# Patient Record
Sex: FEMALE | Race: WHITE | NOT HISPANIC OR LATINO | Employment: FULL TIME | URBAN - METROPOLITAN AREA
[De-identification: names, ages, dates, MRNs, and addresses within clinical notes are randomized per-mention and may not be internally consistent; named-entity substitution may affect disease eponyms.]

---

## 2018-08-08 ENCOUNTER — HOSPITAL ENCOUNTER (EMERGENCY)
Facility: HOSPITAL | Age: 23
Discharge: HOME | End: 2018-08-08
Attending: EMERGENCY MEDICINE | Admitting: EMERGENCY MEDICINE

## 2018-08-08 VITALS
HEART RATE: 89 BPM | WEIGHT: 132.5 LBS | BODY MASS INDEX: 22.62 KG/M2 | OXYGEN SATURATION: 96 % | DIASTOLIC BLOOD PRESSURE: 79 MMHG | TEMPERATURE: 100.5 F | SYSTOLIC BLOOD PRESSURE: 113 MMHG | RESPIRATION RATE: 18 BRPM | HEIGHT: 64 IN

## 2018-08-08 DIAGNOSIS — Z32.01 PREGNANCY TEST POSITIVE: ICD-10-CM

## 2018-08-08 DIAGNOSIS — J02.9 EXUDATIVE PHARYNGITIS: Primary | ICD-10-CM

## 2018-08-08 LAB
ALBUMIN SERPL-MCNC: 4.5 G/DL (ref 3.5–5.2)
ALBUMIN/GLOB SERPL: 1.4 G/DL
ALP SERPL-CCNC: 53 U/L (ref 39–117)
ALT SERPL W P-5'-P-CCNC: 20 U/L (ref 1–33)
ANION GAP SERPL CALCULATED.3IONS-SCNC: 13.1 MMOL/L
AST SERPL-CCNC: 17 U/L (ref 1–32)
BACTERIA UR QL AUTO: ABNORMAL /HPF
BASOPHILS # BLD AUTO: 0.01 10*3/MM3 (ref 0–0.2)
BASOPHILS NFR BLD AUTO: 0.1 % (ref 0–1.5)
BILIRUB SERPL-MCNC: 0.3 MG/DL (ref 0.1–1.2)
BILIRUB UR QL STRIP: NEGATIVE
BUN BLD-MCNC: 10 MG/DL (ref 6–20)
BUN/CREAT SERPL: 11.9 (ref 7–25)
CALCIUM SPEC-SCNC: 9 MG/DL (ref 8.6–10.5)
CHLORIDE SERPL-SCNC: 100 MMOL/L (ref 98–107)
CLARITY UR: ABNORMAL
CO2 SERPL-SCNC: 22.9 MMOL/L (ref 22–29)
COLOR UR: YELLOW
CREAT BLD-MCNC: 0.84 MG/DL (ref 0.57–1)
D-LACTATE SERPL-SCNC: 1 MMOL/L (ref 0.5–2)
DEPRECATED RDW RBC AUTO: 44.4 FL (ref 37–54)
EOSINOPHIL # BLD AUTO: 0 10*3/MM3 (ref 0–0.7)
EOSINOPHIL NFR BLD AUTO: 0 % (ref 0.3–6.2)
ERYTHROCYTE [DISTWIDTH] IN BLOOD BY AUTOMATED COUNT: 12.4 % (ref 11.7–13)
GFR SERPL CREATININE-BSD FRML MDRD: 104 ML/MIN/1.73
GFR SERPL CREATININE-BSD FRML MDRD: 86 ML/MIN/1.73
GLOBULIN UR ELPH-MCNC: 3.2 GM/DL
GLUCOSE BLD-MCNC: 152 MG/DL (ref 65–99)
GLUCOSE UR STRIP-MCNC: NEGATIVE MG/DL
HCG INTACT+B SERPL-ACNC: 23.13 MIU/ML
HCG SERPL QL: POSITIVE
HCT VFR BLD AUTO: 40.6 % (ref 35.6–45.5)
HETEROPH AB SER QL LA: NEGATIVE
HGB BLD-MCNC: 13.8 G/DL (ref 11.9–15.5)
HGB UR QL STRIP.AUTO: ABNORMAL
HOLD SPECIMEN: NORMAL
HYALINE CASTS UR QL AUTO: ABNORMAL /LPF
IMM GRANULOCYTES # BLD: 0.01 10*3/MM3 (ref 0–0.03)
IMM GRANULOCYTES NFR BLD: 0.1 % (ref 0–0.5)
KETONES UR QL STRIP: ABNORMAL
LEUKOCYTE ESTERASE UR QL STRIP.AUTO: ABNORMAL
LYMPHOCYTES # BLD AUTO: 0.66 10*3/MM3 (ref 0.9–4.8)
LYMPHOCYTES NFR BLD AUTO: 6.8 % (ref 19.6–45.3)
MCH RBC QN AUTO: 33.2 PG (ref 26.9–32)
MCHC RBC AUTO-ENTMCNC: 34 G/DL (ref 32.4–36.3)
MCV RBC AUTO: 97.6 FL (ref 80.5–98.2)
MONOCYTES # BLD AUTO: 0.75 10*3/MM3 (ref 0.2–1.2)
MONOCYTES NFR BLD AUTO: 7.7 % (ref 5–12)
MUCOUS THREADS URNS QL MICRO: ABNORMAL /HPF
NEUTROPHILS # BLD AUTO: 8.26 10*3/MM3 (ref 1.9–8.1)
NEUTROPHILS NFR BLD AUTO: 85.4 % (ref 42.7–76)
NITRITE UR QL STRIP: NEGATIVE
PH UR STRIP.AUTO: <=5 [PH] (ref 5–8)
PLATELET # BLD AUTO: 186 10*3/MM3 (ref 140–500)
PMV BLD AUTO: 10.8 FL (ref 6–12)
POTASSIUM BLD-SCNC: 3.6 MMOL/L (ref 3.5–5.2)
PROT SERPL-MCNC: 7.7 G/DL (ref 6–8.5)
PROT UR QL STRIP: ABNORMAL
RBC # BLD AUTO: 4.16 10*6/MM3 (ref 3.9–5.2)
RBC # UR: ABNORMAL /HPF
REF LAB TEST METHOD: ABNORMAL
S PYO AG THROAT QL: NEGATIVE
SODIUM BLD-SCNC: 136 MMOL/L (ref 136–145)
SP GR UR STRIP: 1.03 (ref 1–1.03)
SQUAMOUS #/AREA URNS HPF: ABNORMAL /HPF
UROBILINOGEN UR QL STRIP: ABNORMAL
WBC NRBC COR # BLD: 9.68 10*3/MM3 (ref 4.5–10.7)
WBC UR QL AUTO: ABNORMAL /HPF
WHOLE BLOOD HOLD SPECIMEN: NORMAL
WHOLE BLOOD HOLD SPECIMEN: NORMAL

## 2018-08-08 PROCEDURE — 86308 HETEROPHILE ANTIBODY SCREEN: CPT | Performed by: PHYSICIAN ASSISTANT

## 2018-08-08 PROCEDURE — 80053 COMPREHEN METABOLIC PANEL: CPT | Performed by: PHYSICIAN ASSISTANT

## 2018-08-08 PROCEDURE — 81001 URINALYSIS AUTO W/SCOPE: CPT | Performed by: PHYSICIAN ASSISTANT

## 2018-08-08 PROCEDURE — 96360 HYDRATION IV INFUSION INIT: CPT

## 2018-08-08 PROCEDURE — 83605 ASSAY OF LACTIC ACID: CPT | Performed by: PHYSICIAN ASSISTANT

## 2018-08-08 PROCEDURE — 99284 EMERGENCY DEPT VISIT MOD MDM: CPT

## 2018-08-08 PROCEDURE — 84702 CHORIONIC GONADOTROPIN TEST: CPT | Performed by: EMERGENCY MEDICINE

## 2018-08-08 PROCEDURE — 84703 CHORIONIC GONADOTROPIN ASSAY: CPT | Performed by: PHYSICIAN ASSISTANT

## 2018-08-08 PROCEDURE — 85025 COMPLETE CBC W/AUTO DIFF WBC: CPT | Performed by: PHYSICIAN ASSISTANT

## 2018-08-08 PROCEDURE — 87081 CULTURE SCREEN ONLY: CPT | Performed by: PHYSICIAN ASSISTANT

## 2018-08-08 PROCEDURE — 87880 STREP A ASSAY W/OPTIC: CPT | Performed by: PHYSICIAN ASSISTANT

## 2018-08-08 RX ORDER — ACETAMINOPHEN 500 MG
1000 TABLET ORAL ONCE
Status: COMPLETED | OUTPATIENT
Start: 2018-08-08 | End: 2018-08-08

## 2018-08-08 RX ORDER — AMOXICILLIN AND CLAVULANATE POTASSIUM 875; 125 MG/1; MG/1
1 TABLET, FILM COATED ORAL 2 TIMES DAILY
COMMUNITY
End: 2021-12-28

## 2018-08-08 RX ORDER — IBUPROFEN 800 MG/1
800 TABLET ORAL EVERY 6 HOURS PRN
COMMUNITY
End: 2021-12-28

## 2018-08-08 RX ADMIN — ACETAMINOPHEN 1000 MG: 500 TABLET, FILM COATED ORAL at 11:22

## 2018-08-08 RX ADMIN — SODIUM CHLORIDE 1000 ML: 9 INJECTION, SOLUTION INTRAVENOUS at 11:32

## 2018-08-08 RX ADMIN — SODIUM CHLORIDE 1000 ML: 9 INJECTION, SOLUTION INTRAVENOUS at 13:07

## 2018-08-08 NOTE — ED TRIAGE NOTES
Pt to ED with c/o rapid HR, mouth ulcers, pus on throat, urinary frequency, burning with urination, fever at home. Started on augmentin for UTI and strep, told strep test was neg. Pt took 800mg ibuprofen at 1000 today.

## 2018-08-08 NOTE — ED PROVIDER NOTES
EMERGENCY DEPARTMENT ENCOUNTER    CHIEF COMPLAINT  Chief Complaint: Fever  History given by: Patient   History limited by: none  Room Number: 35/35  PMD: Brittany Gould APRN      HPI:  Pt is a 23 y.o. female who presents complaining of worsening fever self measured at 102 this morning. Pt states she was seen at  yesterday due to sore throat, diagnosed with strep despite negative strep test, and discharged on Augmentin. Pt states she developed mouth sores and nausea since discharge, but denies abd pain. Pt denies development of rash to hands and feet. Pt states she has taken 2 dosages of abx. Pt states LMP was in 5/2018 but cycle is normally irregular.     Duration:  One day   Onset: gradual  Timing: constant  Location: generalized  Radiation: none  Quality: fever  Intensity/Severity: mild  Progression: worsening  Associated Symptoms: nausea, sore throat, mouth sores  Aggravating Factors: unknown, possible strep throat  Alleviating Factors: none  Previous Episodes: none  Treatment before arrival: Pt was started on Augmentin yesterday at .     PAST MEDICAL HISTORY  Active Ambulatory Problems     Diagnosis Date Noted   • No Active Ambulatory Problems     Resolved Ambulatory Problems     Diagnosis Date Noted   • No Resolved Ambulatory Problems     Past Medical History:   Diagnosis Date   • UTI (urinary tract infection)        PAST SURGICAL HISTORY  Past Surgical History:   Procedure Laterality Date   • KNEE ACL RECONSTRUCTION         FAMILY HISTORY  History reviewed. No pertinent family history.    SOCIAL HISTORY  Social History     Social History   • Marital status: Single     Spouse name: N/A   • Number of children: N/A   • Years of education: N/A     Occupational History   • Not on file.     Social History Main Topics   • Smoking status: Never Smoker   • Smokeless tobacco: Not on file   • Alcohol use Yes   • Drug use: No   • Sexual activity: Defer     Other Topics Concern   • Not on file     Social History  Narrative   • No narrative on file       ALLERGIES  Patient has no known allergies.    REVIEW OF SYSTEMS  Review of Systems   Constitutional: Positive for fever (102, self measured).   HENT: Positive for mouth sores (blisters) and sore throat.    Eyes: Negative.    Respiratory: Negative for cough and shortness of breath.    Cardiovascular: Negative for chest pain.   Gastrointestinal: Positive for nausea. Negative for abdominal pain, diarrhea and vomiting.   Genitourinary: Negative for dysuria.   Musculoskeletal: Negative for neck pain.   Skin: Negative for rash.   Allergic/Immunologic: Negative.    Neurological: Negative for weakness, numbness and headaches.   Hematological: Negative.    Psychiatric/Behavioral: Negative.    All other systems reviewed and are negative.      PHYSICAL EXAM  ED Triage Vitals   Temp Heart Rate Resp BP SpO2   08/08/18 1046 08/08/18 1046 08/08/18 1046 08/08/18 1107 08/08/18 1046   100.5 °F (38.1 °C) (!) 123 18 113/76 95 %      Temp src Heart Rate Source Patient Position BP Location FiO2 (%)   08/08/18 1046 08/08/18 1046 -- -- --   Tympanic Monitor          Physical Exam   Constitutional: She is oriented to person, place, and time. No distress.   HENT:   Head: Normocephalic and atraumatic.   Mouth/Throat: Oropharyngeal exudate (no assymetric swelling of tonsils) present.   Pt has blisters to tongue.    Eyes: Pupils are equal, round, and reactive to light. EOM are normal.   Neck: Normal range of motion. Neck supple.   Cardiovascular: Normal rate, regular rhythm and normal heart sounds.    Pulmonary/Chest: Effort normal and breath sounds normal. No respiratory distress.   Abdominal: Soft. There is no tenderness. There is no rebound and no guarding.   Musculoskeletal: Normal range of motion. She exhibits no edema.   Lymphadenopathy:     She has cervical adenopathy (mild ).   Neurological: She is alert and oriented to person, place, and time. She has normal sensation and normal strength.    Skin: Skin is warm and dry. No rash noted.   Psychiatric: Mood and affect normal.   Nursing note and vitals reviewed.      LAB RESULTS  Lab Results (last 24 hours)     Procedure Component Value Units Date/Time    CBC & Differential [[id number]] Collected:  08/08/18 1113    Specimen:  Blood Updated:  08/08/18 1124    Narrative:       The following orders were created for panel order CBC & Differential.  Procedure                               Abnormality         Status                     ---------                               -----------         ------                     CBC Auto Differential[[id number]]        Abnormal            Final result                 Please view results for these tests on the individual orders.    Comprehensive Metabolic Panel [[id number]]  (Abnormal) Collected:  08/08/18 1113    Specimen:  Blood Updated:  08/08/18 1147     Glucose 152 (H) mg/dL      BUN 10 mg/dL      Creatinine 0.84 mg/dL      Sodium 136 mmol/L      Potassium 3.6 mmol/L      Chloride 100 mmol/L      CO2 22.9 mmol/L      Calcium 9.0 mg/dL      Total Protein 7.7 g/dL      Albumin 4.50 g/dL      ALT (SGPT) 20 U/L      AST (SGOT) 17 U/L      Alkaline Phosphatase 53 U/L      Total Bilirubin 0.3 mg/dL      eGFR Non African Amer 86 mL/min/1.73      eGFR  African Amer 104 mL/min/1.73      Globulin 3.2 gm/dL      A/G Ratio 1.4 g/dL      BUN/Creatinine Ratio 11.9     Anion Gap 13.1 mmol/L     Urinalysis With Culture If Indicated - Urine, Clean Catch [[id number]]  (Abnormal) Collected:  08/08/18 1113    Specimen:  Urine from Urine, Clean Catch Updated:  08/08/18 1125     Color, UA Yellow     Appearance, UA Cloudy (A)     pH, UA <=5.0     Specific Gravity, UA 1.027     Glucose, UA Negative     Ketones, UA Trace (A)     Bilirubin, UA Negative     Blood, UA Moderate (2+) (A)     Protein, UA Trace (A)     Leuk Esterase, UA Trace (A)     Nitrite, UA Negative     Urobilinogen, UA 1.0 E.U./dL    Lactic Acid, Plasma [[id number]]  (Normal)  Collected:  08/08/18 1113    Specimen:  Blood Updated:  08/08/18 1151     Lactate 1.0 mmol/L     Mononucleosis Screen [[id number]]  (Normal) Collected:  08/08/18 1113    Specimen:  Blood Updated:  08/08/18 1158     Monospot Negative    hCG, Serum, Qualitative [[id number]]  (Abnormal) Collected:  08/08/18 1113    Specimen:  Blood Updated:  08/08/18 1154     HCG Qualitative Positive (A)    CBC Auto Differential [[id number]]  (Abnormal) Collected:  08/08/18 1113    Specimen:  Blood Updated:  08/08/18 1124     WBC 9.68 10*3/mm3      RBC 4.16 10*6/mm3      Hemoglobin 13.8 g/dL      Hematocrit 40.6 %      MCV 97.6 fL      MCH 33.2 (H) pg      MCHC 34.0 g/dL      RDW 12.4 %      RDW-SD 44.4 fl      MPV 10.8 fL      Platelets 186 10*3/mm3      Neutrophil % 85.4 (H) %      Lymphocyte % 6.8 (L) %      Monocyte % 7.7 %      Eosinophil % 0.0 (L) %      Basophil % 0.1 %      Immature Grans % 0.1 %      Neutrophils, Absolute 8.26 (H) 10*3/mm3      Lymphocytes, Absolute 0.66 (L) 10*3/mm3      Monocytes, Absolute 0.75 10*3/mm3      Eosinophils, Absolute 0.00 10*3/mm3      Basophils, Absolute 0.01 10*3/mm3      Immature Grans, Absolute 0.01 10*3/mm3     Urinalysis, Microscopic Only - Urine, Clean Catch [[id number]]  (Abnormal) Collected:  08/08/18 1113    Specimen:  Urine from Urine, Clean Catch Updated:  08/08/18 1141     RBC, UA 3-5 (A) /HPF      WBC, UA 3-5 (A) /HPF      Bacteria, UA 1+ (A) /HPF      Squamous Epithelial Cells, UA 7-12 (A) /HPF      Hyaline Casts, UA None Seen /LPF      Mucus, UA Moderate/2+ (A) /HPF      Methodology Manual Light Microscopy    hCG, Quantitative, Pregnancy [[id number]] Collected:  08/08/18 1113    Specimen:  Blood Updated:  08/08/18 1259     HCG Quantitative 23.13 mIU/mL     Narrative:       HCG Ranges by Gestational Age    3 Weeks         5.4 -      72 mIU/mL  4 Weeks        10.2 -     708 mIU/mL  5 Weeks       217   -   8,245 mIU/mL  6 Weeks       152   -  32,177 mIU/mL  7 Weeks     4,059   - 153,767  mIU/mL  8 Weeks    31,366   - 149,094 mIU/mL  9 Weeks    59,109   - 135,901 mIU/mL  10 Weeks   44,186   - 170,409 mIU/mL  12 Weeks   27,107   - 201,615 mIU/mL  14 Weeks   24,302   -  93,646 mIU/mL  15 Weeks   12,540   -  69,747 mIU/mL  16 Weeks    8,904   -  55,332 mIU/mL  17 Weeks    8,240   -  51,793 mIU/mL  18 Weeks    9,649   -  55,271 mIU/mL    Rapid Strep A Screen - Swab, Throat [[id number]]  (Normal) Collected:  08/08/18 1122    Specimen:  Swab from Throat Updated:  08/08/18 1138     Strep A Ag Negative    Beta Strep Culture, Throat - Swab, Throat [[id number]] Collected:  08/08/18 1122    Specimen:  Swab from Throat Updated:  08/08/18 1138          I ordered the above labs and reviewed the results    Procedures      PROGRESS AND CONSULTS  ED Course as of Aug 08 1310   Wed Aug 08, 2018   1110 Sore throat and dysuria x few days. [facility] rx for augmentin yesterday. Mouth ulcers and fever 102 this am.  [KA]      ED Course User Index  [KA] Michelle Koch PA     1225: Upon pt exam, discussed results of labs and the fact that pt is pregnant. Discussed plan for further evaluation of pregnancy.     1229: HCG Quantitative performed.     1234: Discussed pregnancy further with pt. Pt states it is not irregular for her to have irregular periods and pt states she is on birth control. Pt denies usage of abx as well as missing dosages of birth control.    1310: Pt rechecked and resting comfortably. Discussed results of HCG and plan for discharge with outpatient OB follow up for repeat HCG. Discussed plan for pt to continue abx for treatment of possible strep and OTC medications for management of fever. Pt understands and agrees with plan, all questions addressed.      MEDICAL DECISION MAKING  Results were reviewed/discussed with the patient and they were also made aware of online access. Pt also made aware that some labs, such as cultures, will not be resulted during ER visit and follow up with PMD is necessary.     MDM  Number  of Diagnoses or Management Options     Amount and/or Complexity of Data Reviewed  Clinical lab tests: reviewed (HCG Qualitative - positive  HCG Quant - 23.13  Monospot and Strep -negative)           DIAGNOSIS  Final diagnoses:   Exudative pharyngitis   Pregnancy test positive       DISPOSITION  DISCHARGE    Patient discharged in stable condition.    Reviewed implications of results, diagnosis, meds, responsibility to follow up, warning signs and symptoms of possible worsening, potential complications and reasons to return to ER.    Patient/Family voiced understanding of above instructions.    Discussed plan for discharge, as there is no emergent indication for admission. Patient referred to primary care provider for BP management due to today's BP. Pt/family is agreeable and understands need for follow up and repeat testing.  Pt is aware that discharge does not mean that nothing is wrong but it indicates no emergency is present that requires admission and they must continue care with follow-up as given below or physician of their choice.     FOLLOW-UP  Edi Martin MD  [address] [address]  [phone]    Call            Medication List      No changes were made to your prescriptions during this visit.           Latest Documented Vital Signs:  As of 1:10 PM  BP- 111/79 HR- 89 Temp- 100.5 °F (38.1 °C) (Tympanic) O2 sat- 97%    --  Documentation assistance provided by teresa Brown for Dr. Humphries.  Information recorded by the scribe was done at my direction and has been verified and validated by me.          Kathryn Brown  08/08/18 1310       Steve Humphries MD  08/08/18 4124

## 2018-08-10 LAB — BACTERIA SPEC AEROBE CULT: NORMAL

## 2021-04-26 ENCOUNTER — OFFICE VISIT (OUTPATIENT)
Dept: OBSTETRICS AND GYNECOLOGY | Age: 26
End: 2021-04-26

## 2021-04-26 VITALS
SYSTOLIC BLOOD PRESSURE: 124 MMHG | HEIGHT: 64 IN | BODY MASS INDEX: 24.04 KG/M2 | DIASTOLIC BLOOD PRESSURE: 78 MMHG | WEIGHT: 140.8 LBS

## 2021-04-26 DIAGNOSIS — Z11.3 ROUTINE SCREENING FOR STI (SEXUALLY TRANSMITTED INFECTION): ICD-10-CM

## 2021-04-26 DIAGNOSIS — Z12.4 SCREENING FOR CERVICAL CANCER: Primary | ICD-10-CM

## 2021-04-26 PROCEDURE — 99385 PREV VISIT NEW AGE 18-39: CPT | Performed by: OBSTETRICS & GYNECOLOGY

## 2021-04-26 RX ORDER — NORGESTIMATE AND ETHINYL ESTRADIOL 0.25-0.035
1 KIT ORAL DAILY
Qty: 84 TABLET | Refills: 3 | Status: SHIPPED | OUTPATIENT
Start: 2021-04-26 | End: 2021-12-28

## 2021-04-26 NOTE — PROGRESS NOTES
Routine Annual Visit    2021    Patient: Viry Theodore          MR#:[id number]      Chief Complaint   Patient presents with   • Gynecologic Exam     New pt, No pap on file, OCP working well. Pt has no complaints.    • Establish Care       History of Present Illness    25 y.o. female  who presents for annual exam. No current complaints. Has been to gyn in  but hasnt been back since. Menses were irregular then as well, every 4-6 mo.  Only issue was painful periods and heavier bleeding. Is on sprintec and happy with menses.  She is getting  soon and will consider having babies in 3 to 4 years.  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  Obstetric History:  OB History        1    Para        Term                AB   1    Living           SAB        TAB        Ectopic        Molar        Multiple        Live Births                   Menstrual History:     Patient's last menstrual period was 2021 (within days).       ________________________________________  There is no problem list on file for this patient.      Past Medical History:   Diagnosis Date   • UTI (urinary tract infection)        Family History   Problem Relation Age of Onset   • Breast cancer Mother    • Endometriosis Maternal Grandmother        Past Surgical History:   Procedure Laterality Date   • KNEE ACL RECONSTRUCTION         Social History     Tobacco Use   Smoking Status Never Smoker   Smokeless Tobacco Never Used       has a current medication list which includes the following prescription(s): norgestimate-eth estradiol, amoxicillin-clavulanate, and ibuprofen.  ________________________________________      Review  "of Systems   Constitutional: Negative for fever and unexpected weight change.   Respiratory: Negative for shortness of breath.    Cardiovascular: Negative for chest pain.   Gastrointestinal: Negative for abdominal pain, constipation and diarrhea.   Genitourinary: Negative for frequency and urgency.   Hematological: Negative for adenopathy.   Psychiatric/Behavioral: Negative for dysphoric mood.       Objective   Physical Exam    /78   Ht 162.6 cm (64\")   Wt 63.9 kg (140 lb 12.8 oz)   LMP 04/11/2021 (Within Days)   Breastfeeding No   BMI 24.17 kg/m²    BP Readings from Last 3 Encounters:   04/26/21 124/78   08/08/18 113/79      Wt Readings from Last 3 Encounters:   04/26/21 63.9 kg (140 lb 12.8 oz)   08/08/18 60.1 kg (132 lb 8 oz)         BMI: Body mass index is 24.17 kg/m².       General:   alert, appears stated age and cooperative   Neck: No thyromegaly or LAD, no carotid bruit noted   Heart:: regular rate and rhythm, S1, S2 normal, no murmur, click, rub or gallop   Lungs: normal respiratory effort and auscultation   Abdomen: soft, non-tender, without masses or organomegaly   Breast: inspection negative, no nipple discharge or bleeding, no masses or nodularity palpable   Urethra and bladder: urethral meatus normal; bladder nontender to palpation;   Vulva: normal, Bartholin's, Urethra, [place]'s normal   Vagina: normal mucosa, normal discharge   Cervix: no lesions and nulliparous appearance   Uterus: normal size, mobile or retroverted   Adnexa: normal adnexa and no mass, fullness, tenderness       Assessment:    normal annual exam   STI screening  OCP use    Plan:    Plan     []  Mammogram request made  [x]  PAP done  []  Labs:   [x]  GC/Chl/TV  []  DEXA scan   []  Referral for colonoscopy:     Discussed that if she comes off of OCPs and is planning on pregnancy, her menses are irregular she needs to present for evaluation    Counseling  [x]  Nutrition  [x]  Physical activity/regular exercise   [x]  Healthy " weight  []  Injury prevention  []  Smoking cessation  []  Substance misuse/abuse  [x]  Sexual behavior  []  STD prevention  [x]  Contraception  []  Dental health  []  Mental health  [x]  Immunization  [x]  Encouraged SBE      Saritha Nelson MD  04/26/2021  11:34 EDT

## 2021-04-28 ENCOUNTER — TELEPHONE (OUTPATIENT)
Dept: OBSTETRICS AND GYNECOLOGY | Age: 26
End: 2021-04-28

## 2021-04-28 LAB
C TRACH RRNA CVX QL NAA+PROBE: NEGATIVE
CONV .: NORMAL
CYTOLOGIST CVX/VAG CYTO: NORMAL
CYTOLOGY CVX/VAG DOC CYTO: NORMAL
CYTOLOGY CVX/VAG DOC THIN PREP: NORMAL
DX ICD CODE: NORMAL
HIV 1 & 2 AB SER-IMP: NORMAL
N GONORRHOEA RRNA CVX QL NAA+PROBE: NEGATIVE
OTHER STN SPEC: NORMAL
STAT OF ADQ CVX/VAG CYTO-IMP: NORMAL
T VAGINALIS RRNA SPEC QL NAA+PROBE: NEGATIVE

## 2021-04-28 NOTE — TELEPHONE ENCOUNTER
----- Message from Saritha Nelson MD sent at 4/28/2021  9:48 AM EDT -----  Please notify that her Pap was normal, there were no abnormal cells found.  Also, screening for gonorrhea, chlamydia, trichomonas was negative    Saritha Nelson MD  4/28/2021  09:48 EDT

## 2021-04-28 NOTE — PROGRESS NOTES
Please notify that her Pap was normal, there were no abnormal cells found.  Also, screening for gonorrhea, chlamydia, trichomonas was negative    Saritha Nelson MD  4/28/2021  09:48 EDT

## 2021-12-20 ENCOUNTER — TELEPHONE (OUTPATIENT)
Dept: OBSTETRICS AND GYNECOLOGY | Age: 26
End: 2021-12-20

## 2021-12-28 ENCOUNTER — OFFICE VISIT (OUTPATIENT)
Dept: OBSTETRICS AND GYNECOLOGY | Age: 26
End: 2021-12-28

## 2021-12-28 VITALS
BODY MASS INDEX: 26.29 KG/M2 | HEIGHT: 64 IN | DIASTOLIC BLOOD PRESSURE: 70 MMHG | SYSTOLIC BLOOD PRESSURE: 112 MMHG | WEIGHT: 154 LBS

## 2021-12-28 DIAGNOSIS — Z34.90 EARLY STAGE OF PREGNANCY: Primary | ICD-10-CM

## 2021-12-28 PROCEDURE — 99213 OFFICE O/P EST LOW 20 MIN: CPT | Performed by: PHYSICIAN ASSISTANT

## 2021-12-28 NOTE — PROGRESS NOTES
"Subjective     Chief Complaint   Patient presents with   • Gynecologic Exam     early pregnancy confirmation unsure of period.  possibly september.  but is not regular.       Viry Theodore is a 26 y.o.  whose LMP is No LMP recorded (lmp unknown). Patient is pregnant. presents for early stage of pregnancy scan    She is unsure LMP  Recalls it being in September but it is not unusual for her to skip her period  She did have a +UPT on     She is here with her , Ady  Planned pregnancy    Is noting nausea  On PNV  Has had some constipation and straining has caused spotting    No Additional Complaints Reported    The following portions of the patient's history were reviewed and updated as appropriate:vital signs, allergies, current medications, past family history, past medical history, past social history, past surgical history and problem list      Review of Systems   Genitourinary:positive for early stage of pregnancy     Objective      /70   Ht 162.6 cm (64\")   Wt 69.9 kg (154 lb)   LMP  (LMP Unknown)   Breastfeeding No   BMI 26.43 kg/m²     Physical Exam    General:   alert, comfortable and no distress   Heart: Not performed today   Lungs: Not performed today.   Breast: Not performed today   Neck: na   Abdomen: {Not performed today   CVA: Not performed today   Pelvis: Not performed today   Extremities: Not performed today   Neurologic: negative   Psychiatric: Normal affect, judgement, and mood       Lab Review   Labs: No data reviewed     Imaging   Ultrasound - Pelvic Vaginal    GS seen, Yolk sac seen, no fetal pole  GA measures c/w 5 wks 5 days  Probable CL seen on left ovary    Assessment/Plan     ASSESSMENT  1. Early stage of pregnancy        PLAN  1.   Orders Placed This Encounter   Procedures   • Urine Culture - Urine, Urine, Random Void   • HIV-1 / O / 2 Ag / Antibody 4th Generation   • Hepatitis B Surface Antigen   • Hepatitis C Antibody   • Rubella Antibody, IgG "   • RPR, Rfx Qn RPR / Confirm TP   • ABO / Rh   • Antibody Screen   • CBC & Differential       2. Prenatal labs ordered today. Will plan rpt scan in 2 wks. Pt has mentioned constipation and straining which has caused some spotting. U/s is otherwise reassuring,  Have recommended pt avoid IC until next visit in meantime and also start stool softener.    Follow up: 2 week(s)    GREY Whitehead  12/28/2021

## 2021-12-30 LAB
ABO GROUP BLD: NORMAL
BACTERIA UR CULT: NORMAL
BACTERIA UR CULT: NORMAL
BASOPHILS # BLD AUTO: 0.1 X10E3/UL (ref 0–0.2)
BASOPHILS NFR BLD AUTO: 1 %
BLD GP AB SCN SERPL QL: NEGATIVE
EOSINOPHIL # BLD AUTO: 0 X10E3/UL (ref 0–0.4)
EOSINOPHIL NFR BLD AUTO: 1 %
ERYTHROCYTE [DISTWIDTH] IN BLOOD BY AUTOMATED COUNT: 12.4 % (ref 11.7–15.4)
HBV SURFACE AG SERPL QL IA: NEGATIVE
HCT VFR BLD AUTO: 45.5 % (ref 34–46.6)
HCV AB S/CO SERPL IA: <0.1 S/CO RATIO (ref 0–0.9)
HGB BLD-MCNC: 15.6 G/DL (ref 11.1–15.9)
HIV 1+2 AB+HIV1 P24 AG SERPL QL IA: NON REACTIVE
IMM GRANULOCYTES # BLD AUTO: 0 X10E3/UL (ref 0–0.1)
IMM GRANULOCYTES NFR BLD AUTO: 0 %
LYMPHOCYTES # BLD AUTO: 2 X10E3/UL (ref 0.7–3.1)
LYMPHOCYTES NFR BLD AUTO: 28 %
MCH RBC QN AUTO: 33.1 PG (ref 26.6–33)
MCHC RBC AUTO-ENTMCNC: 34.3 G/DL (ref 31.5–35.7)
MCV RBC AUTO: 96 FL (ref 79–97)
MONOCYTES # BLD AUTO: 0.5 X10E3/UL (ref 0.1–0.9)
MONOCYTES NFR BLD AUTO: 7 %
NEUTROPHILS # BLD AUTO: 4.5 X10E3/UL (ref 1.4–7)
NEUTROPHILS NFR BLD AUTO: 63 %
PLATELET # BLD AUTO: 348 X10E3/UL (ref 150–450)
RBC # BLD AUTO: 4.72 X10E6/UL (ref 3.77–5.28)
RH BLD: POSITIVE
RPR SER QL: NON REACTIVE
RUBV IGG SERPL IA-ACNC: 8.23 INDEX
WBC # BLD AUTO: 7.1 X10E3/UL (ref 3.4–10.8)

## 2022-01-11 ENCOUNTER — TELEPHONE (OUTPATIENT)
Dept: OBSTETRICS AND GYNECOLOGY | Age: 27
End: 2022-01-11

## 2022-01-11 NOTE — TELEPHONE ENCOUNTER
8 WKS OB PT WAS DIAGNOSED WITH COVID YESTERDAY AT [facility]. THEY ARE BACKED UP ON APPOINTMENTS FOR THE ANTIBODIES  AND PT WANTS TO KNOW IF YOU CAN PLACE ORDER FOR HER TO HAVE THEM DONE AT [facility]

## 2022-01-11 NOTE — TELEPHONE ENCOUNTER
As of this morning we were told they do not have any medication or any openings to schedule appts.  They did not have an estimate for when they will start scheduling again.

## 2022-02-02 ENCOUNTER — ROUTINE PRENATAL (OUTPATIENT)
Dept: OBSTETRICS AND GYNECOLOGY | Age: 27
End: 2022-02-02

## 2022-02-02 VITALS — WEIGHT: 157.8 LBS | DIASTOLIC BLOOD PRESSURE: 70 MMHG | BODY MASS INDEX: 27.09 KG/M2 | SYSTOLIC BLOOD PRESSURE: 118 MMHG

## 2022-02-02 DIAGNOSIS — Z34.01 ENCOUNTER FOR SUPERVISION OF NORMAL FIRST PREGNANCY IN FIRST TRIMESTER: ICD-10-CM

## 2022-02-02 DIAGNOSIS — Z34.90 EARLY STAGE OF PREGNANCY: Primary | ICD-10-CM

## 2022-02-02 PROBLEM — Z34.00 SUPERVISION OF NORMAL FIRST PREGNANCY, ANTEPARTUM: Status: ACTIVE | Noted: 2022-02-02

## 2022-02-02 PROCEDURE — 0501F PRENATAL FLOW SHEET: CPT | Performed by: OBSTETRICS & GYNECOLOGY

## 2022-02-02 RX ORDER — PSEUDOEPHEDRINE HCL 30 MG
100 TABLET ORAL
COMMUNITY
End: 2022-10-10

## 2022-02-02 NOTE — PROGRESS NOTES
Chief Complaint   Patient presents with   • Routine Prenatal Visit     10w4d OB Check      Viry Theodore presents for new OB visit.  She notes that she has mild nausea, denies need for any medications though.  We discussed some lifestyle management for this.  No cramping or bleeding.  She is accompanied by her new  Ady today    She has received a flu shot  She likely desires genetic screening but desires to check on cost first  Advised her to get her Covid booster    Reviewed routine prenatal care with the office to include but not limited to expected weight gain during pregnancy, Tylenol products are fine, avoid aspirin and ibuprofen; not to change cat litter; food restrictions; avoidance of alcohol, tobacco, drugs and saunas/hot tubs, discussed that the COVID vaccine is safe and recommended in pregnancy.     Return in about 4 weeks (around 3/2/2022) for ob follow up, then another 4 wks with anatomy scan please! .    Saritha Nelson MD  2/2/2022  14:42 EST

## 2022-02-10 ENCOUNTER — TELEPHONE (OUTPATIENT)
Dept: OBSTETRICS AND GYNECOLOGY | Age: 27
End: 2022-02-10

## 2022-02-10 NOTE — TELEPHONE ENCOUNTER
Patient is currently 11w 5d and has been using colace for increased constipation.She wants to know if she can take more than 1 a day.Not much relief with just one pill a day.Please advise.

## 2022-03-03 ENCOUNTER — ROUTINE PRENATAL (OUTPATIENT)
Dept: OBSTETRICS AND GYNECOLOGY | Age: 27
End: 2022-03-03

## 2022-03-03 VITALS — BODY MASS INDEX: 27.29 KG/M2 | DIASTOLIC BLOOD PRESSURE: 76 MMHG | SYSTOLIC BLOOD PRESSURE: 110 MMHG | WEIGHT: 159 LBS

## 2022-03-03 DIAGNOSIS — Z13.89 SCREENING FOR BLOOD OR PROTEIN IN URINE: Primary | ICD-10-CM

## 2022-03-03 LAB
GLUCOSE UR STRIP-MCNC: NEGATIVE MG/DL
PROT UR STRIP-MCNC: NEGATIVE MG/DL

## 2022-03-03 PROCEDURE — 0502F SUBSEQUENT PRENATAL CARE: CPT | Performed by: OBSTETRICS & GYNECOLOGY

## 2022-03-03 RX ORDER — ASPIRIN 81 MG/1
81 TABLET, CHEWABLE ORAL DAILY
Status: ON HOLD | COMMUNITY
End: 2022-08-21

## 2022-03-03 RX ORDER — ONDANSETRON 4 MG/1
4 TABLET, ORALLY DISINTEGRATING ORAL EVERY 6 HOURS PRN
Qty: 30 TABLET | Refills: 1 | Status: SHIPPED | OUTPATIENT
Start: 2022-03-03 | End: 2022-10-10

## 2022-03-03 NOTE — PROGRESS NOTES
Chief Complaint   Patient presents with   • Routine Prenatal Visit     14w5d OB Check, pt c/o of nausea and would like zofran called into her pharmacy     Viry Theodore is doing well, just has some nausea and desires some zofran. Sent in today  Round ligament pain sometimes.  Feeling flutters  Genetic screening today    FU 4 wks with anatomy scan please      Saritha Nelson MD  3/3/2022  13:37 EST

## 2022-03-07 ENCOUNTER — TELEPHONE (OUTPATIENT)
Dept: OBSTETRICS AND GYNECOLOGY | Age: 27
End: 2022-03-07

## 2022-03-07 RX ORDER — PROMETHAZINE HYDROCHLORIDE 12.5 MG/1
12.5 TABLET ORAL EVERY 6 HOURS PRN
Qty: 40 TABLET | Refills: 2 | Status: SHIPPED | OUTPATIENT
Start: 2022-03-07 | End: 2022-10-10

## 2022-03-07 NOTE — TELEPHONE ENCOUNTER
Pt calls 15w2d with c/o nausea/vomiting with Zofran since Friday 03/04. Pt has been able to keep down fluids and has been staying hydrated. PT asking what else she can do to keep down food, please advise

## 2022-03-07 NOTE — TELEPHONE ENCOUNTER
Called and spoke with her, she is doing well keeping down liquids but having difficult time with solids.  She is still urinating well, is well-hydrated.  Send in Phenergan that she can use in addition to the Zofran.  If not improving the next couple days please let us know.  Not able to tolerate liquids persistently, may need to go to the ER for fluids.    Saritha Nelson MD  3/7/2022

## 2022-04-06 ENCOUNTER — ROUTINE PRENATAL (OUTPATIENT)
Dept: OBSTETRICS AND GYNECOLOGY | Age: 27
End: 2022-04-06

## 2022-04-06 VITALS — DIASTOLIC BLOOD PRESSURE: 74 MMHG | BODY MASS INDEX: 27.36 KG/M2 | WEIGHT: 159.4 LBS | SYSTOLIC BLOOD PRESSURE: 116 MMHG

## 2022-04-06 DIAGNOSIS — Z34.00 SUPERVISION OF NORMAL FIRST PREGNANCY, ANTEPARTUM: ICD-10-CM

## 2022-04-06 DIAGNOSIS — O44.20 MARGINAL PLACENTA PREVIA: ICD-10-CM

## 2022-04-06 DIAGNOSIS — Z13.89 SCREENING FOR BLOOD OR PROTEIN IN URINE: Primary | ICD-10-CM

## 2022-04-06 LAB
GLUCOSE UR STRIP-MCNC: NEGATIVE MG/DL
PROT UR STRIP-MCNC: NEGATIVE MG/DL

## 2022-04-06 PROCEDURE — 0502F SUBSEQUENT PRENATAL CARE: CPT | Performed by: OBSTETRICS & GYNECOLOGY

## 2022-04-06 NOTE — PROGRESS NOTES
Chief Complaint   Patient presents with   • Routine Prenatal Visit     19w4d OB Check with Anatomy US     Viry Theodore is doing well, occasional nausea but not too bad.  Her partner complains that she is not eating much.  She has gained 5 pounds this pregnancy, discussed that this is adequate.  Most weight gain will take place in the third trimester.  Anatomy normal, but has an anterior marginal previa, approximately 2.1 cm from the cervical os.  Advised pelvic rest until repeat ultrasound in 4 weeks    Saritha Nelson MD  4/6/2022  14:26 EDT

## 2022-04-08 LAB
AFP INTERP SERPL-IMP: NORMAL
AFP INTERP SERPL-IMP: NORMAL
AFP MOM SERPL: 1.07
AFP SERPL-MCNC: 58.2 NG/ML
AGE AT DELIVERY: 25.2 YR
GA METHOD: NORMAL
GA: 19.6 WEEKS
IDDM PATIENT QL: NO
LABORATORY COMMENT REPORT: NORMAL
MULTIPLE PREGNANCY: NO
NEURAL TUBE DEFECT RISK FETUS: 9862 %
RESULT: NORMAL

## 2022-04-11 NOTE — PROGRESS NOTES
Please notify patient that her AFP screen for open spina bifida was negative    Saritha Nelson MD  4/11/2022  11:21 EDT

## 2022-05-02 ENCOUNTER — ROUTINE PRENATAL (OUTPATIENT)
Dept: OBSTETRICS AND GYNECOLOGY | Age: 27
End: 2022-05-02

## 2022-05-02 VITALS — BODY MASS INDEX: 28.53 KG/M2 | DIASTOLIC BLOOD PRESSURE: 76 MMHG | WEIGHT: 166.2 LBS | SYSTOLIC BLOOD PRESSURE: 114 MMHG

## 2022-05-02 DIAGNOSIS — Z13.89 SCREENING FOR BLOOD OR PROTEIN IN URINE: Primary | ICD-10-CM

## 2022-05-02 DIAGNOSIS — Z34.00 SUPERVISION OF NORMAL FIRST PREGNANCY, ANTEPARTUM: ICD-10-CM

## 2022-05-02 PROBLEM — O44.20 MARGINAL PLACENTA PREVIA: Status: RESOLVED | Noted: 2022-04-06 | Resolved: 2022-05-02

## 2022-05-02 LAB
GLUCOSE UR STRIP-MCNC: NEGATIVE MG/DL
PROT UR STRIP-MCNC: NEGATIVE MG/DL

## 2022-05-02 PROCEDURE — 0502F SUBSEQUENT PRENATAL CARE: CPT | Performed by: OBSTETRICS & GYNECOLOGY

## 2022-05-02 NOTE — PROGRESS NOTES
Chief Complaint   Patient presents with   • Routine Prenatal Visit     23w2d OB Check with US     Viry Theodore is doing well, no complaints  Previa resolved, normal growth.   Feeling FM now and consistently  Previa now resolved, advised can resume IC if desires  Gained 10 lb since last visit, discussed to slow down a bit with weight gain    FU 4 wks with GCT    Saritha Nelson MD  5/2/2022  13:44 EDT

## 2022-06-01 ENCOUNTER — ROUTINE PRENATAL (OUTPATIENT)
Dept: OBSTETRICS AND GYNECOLOGY | Age: 27
End: 2022-06-01

## 2022-06-01 VITALS — SYSTOLIC BLOOD PRESSURE: 116 MMHG | BODY MASS INDEX: 29.28 KG/M2 | WEIGHT: 170.6 LBS | DIASTOLIC BLOOD PRESSURE: 70 MMHG

## 2022-06-01 DIAGNOSIS — Z13.0 SCREENING FOR IRON DEFICIENCY ANEMIA: ICD-10-CM

## 2022-06-01 DIAGNOSIS — Z13.89 SCREENING FOR BLOOD OR PROTEIN IN URINE: ICD-10-CM

## 2022-06-01 DIAGNOSIS — Z13.1 SCREENING FOR DIABETES MELLITUS: ICD-10-CM

## 2022-06-01 LAB
GLUCOSE UR STRIP-MCNC: NEGATIVE MG/DL
PROT UR STRIP-MCNC: NEGATIVE MG/DL

## 2022-06-01 PROCEDURE — 0502F SUBSEQUENT PRENATAL CARE: CPT | Performed by: OBSTETRICS & GYNECOLOGY

## 2022-06-01 NOTE — PROGRESS NOTES
Chief Complaint   Patient presents with   • Routine Prenatal Visit     27w4d OB Check, 1 HR GTT      Viry Theodore is doing well, no complaints, normal fetal movement  No ctx, bleeding, LOF  Plans to breastfeed  OCPs  Plans epidural    GCT, CBC, tdap today    FU 2 wks    Saritha Nelson MD  6/1/2022  11:10 EDT

## 2022-06-02 ENCOUNTER — TELEPHONE (OUTPATIENT)
Dept: OBSTETRICS AND GYNECOLOGY | Age: 27
End: 2022-06-02

## 2022-06-02 DIAGNOSIS — O99.810 ABNORMAL GLUCOSE TOLERANCE AFFECTING PREGNANCY, ANTEPARTUM: Primary | ICD-10-CM

## 2022-06-02 LAB
BASOPHILS # BLD AUTO: 0 X10E3/UL (ref 0–0.2)
BASOPHILS NFR BLD AUTO: 0 %
EOSINOPHIL # BLD AUTO: 0 X10E3/UL (ref 0–0.4)
EOSINOPHIL NFR BLD AUTO: 0 %
ERYTHROCYTE [DISTWIDTH] IN BLOOD BY AUTOMATED COUNT: 11.9 % (ref 11.7–15.4)
GLUCOSE 1H P 50 G GLC PO SERPL-MCNC: 136 MG/DL (ref 65–139)
HCT VFR BLD AUTO: 37.5 % (ref 34–46.6)
HGB BLD-MCNC: 12.7 G/DL (ref 11.1–15.9)
IMM GRANULOCYTES # BLD AUTO: 0 X10E3/UL (ref 0–0.1)
IMM GRANULOCYTES NFR BLD AUTO: 0 %
LYMPHOCYTES # BLD AUTO: 1.6 X10E3/UL (ref 0.7–3.1)
LYMPHOCYTES NFR BLD AUTO: 17 %
MCH RBC QN AUTO: 33.7 PG (ref 26.6–33)
MCHC RBC AUTO-ENTMCNC: 33.9 G/DL (ref 31.5–35.7)
MCV RBC AUTO: 100 FL (ref 79–97)
MONOCYTES # BLD AUTO: 0.6 X10E3/UL (ref 0.1–0.9)
MONOCYTES NFR BLD AUTO: 6 %
NEUTROPHILS # BLD AUTO: 7.3 X10E3/UL (ref 1.4–7)
NEUTROPHILS NFR BLD AUTO: 77 %
PLATELET # BLD AUTO: 249 X10E3/UL (ref 150–450)
RBC # BLD AUTO: 3.77 X10E6/UL (ref 3.77–5.28)
WBC # BLD AUTO: 9.5 X10E3/UL (ref 3.4–10.8)

## 2022-06-02 NOTE — TELEPHONE ENCOUNTER
ATTEMPTED TO WARM TRANSFER BUT UNABLE TO REACH THE PRACTICE.     Caller: Viry Theodore    Relationship to patient: Self    Best call back number: [phone]    Patient is needing: PLEASE CALL PT TO SCHEDULE 3 HR GLUCOSE TEST.

## 2022-06-03 ENCOUNTER — LAB (OUTPATIENT)
Dept: OBSTETRICS AND GYNECOLOGY | Age: 27
End: 2022-06-03

## 2022-06-03 DIAGNOSIS — O99.810 ABNORMAL GLUCOSE TOLERANCE AFFECTING PREGNANCY, ANTEPARTUM: ICD-10-CM

## 2022-06-04 LAB
GLUCOSE 1H P 100 G GLC PO SERPL-MCNC: 135 MG/DL (ref 65–179)
GLUCOSE 2H P 100 G GLC PO SERPL-MCNC: 142 MG/DL (ref 65–154)
GLUCOSE 3H P 100 G GLC PO SERPL-MCNC: 110 MG/DL (ref 65–139)
GLUCOSE P FAST SERPL-MCNC: 84 MG/DL (ref 65–94)
Lab: NORMAL

## 2022-06-15 ENCOUNTER — ROUTINE PRENATAL (OUTPATIENT)
Dept: OBSTETRICS AND GYNECOLOGY | Age: 27
End: 2022-06-15

## 2022-06-15 VITALS — BODY MASS INDEX: 30 KG/M2 | SYSTOLIC BLOOD PRESSURE: 124 MMHG | WEIGHT: 174.8 LBS | DIASTOLIC BLOOD PRESSURE: 70 MMHG

## 2022-06-15 DIAGNOSIS — O99.810 ABNORMAL GLUCOSE TOLERANCE AFFECTING PREGNANCY, ANTEPARTUM: ICD-10-CM

## 2022-06-15 DIAGNOSIS — Z13.89 SCREENING FOR BLOOD OR PROTEIN IN URINE: Primary | ICD-10-CM

## 2022-06-15 DIAGNOSIS — Z34.00 SUPERVISION OF NORMAL FIRST PREGNANCY, ANTEPARTUM: ICD-10-CM

## 2022-06-15 LAB
GLUCOSE UR STRIP-MCNC: NEGATIVE MG/DL
PROT UR STRIP-MCNC: NEGATIVE MG/DL

## 2022-06-15 PROCEDURE — 0502F SUBSEQUENT PRENATAL CARE: CPT | Performed by: OBSTETRICS & GYNECOLOGY

## 2022-06-16 NOTE — PROGRESS NOTES
Chief Complaint   Patient presents with   • Routine Prenatal Visit     29w4d OB Check      Viry Theodore is doing well, no current complaints.  She had an abnormal 1 hour but all 3 values of her 3-hour glucose test were normal.  Baby girl Nata Jones  She is feeling normal fetal movement, no cramping or contractions  Reviewed  labor precautions, fetal movement precautions    Follow-up in 2 weeks    Saritha Nelson MD  2022  17:02 EDT

## 2022-06-27 ENCOUNTER — ROUTINE PRENATAL (OUTPATIENT)
Dept: OBSTETRICS AND GYNECOLOGY | Age: 27
End: 2022-06-27

## 2022-06-27 VITALS — SYSTOLIC BLOOD PRESSURE: 122 MMHG | DIASTOLIC BLOOD PRESSURE: 76 MMHG | WEIGHT: 178.8 LBS | BODY MASS INDEX: 30.69 KG/M2

## 2022-06-27 DIAGNOSIS — Z13.89 SCREENING FOR BLOOD OR PROTEIN IN URINE: Primary | ICD-10-CM

## 2022-06-27 LAB
GLUCOSE UR STRIP-MCNC: NEGATIVE MG/DL
PROT UR STRIP-MCNC: NEGATIVE MG/DL

## 2022-06-27 PROCEDURE — 0502F SUBSEQUENT PRENATAL CARE: CPT | Performed by: OBSTETRICS & GYNECOLOGY

## 2022-06-27 NOTE — PROGRESS NOTES
Chief Complaint   Patient presents with   • Routine Prenatal Visit     31w2d OB Check, pt concerned that she had some spotting during intercourse and is feeling more pelvic pressure      Virynoy Theodore is doing well but had small amt spotting after IC last week. None further. Discussed is common issue, to the hospital if heavier bleeding.  Needs breastpump  Has plans for classes    FU 2 wks    Saritha Nelson MD  6/27/2022  13:30 EDT

## 2022-07-13 ENCOUNTER — ROUTINE PRENATAL (OUTPATIENT)
Dept: OBSTETRICS AND GYNECOLOGY | Age: 27
End: 2022-07-13

## 2022-07-13 VITALS — WEIGHT: 180 LBS | SYSTOLIC BLOOD PRESSURE: 118 MMHG | BODY MASS INDEX: 30.9 KG/M2 | DIASTOLIC BLOOD PRESSURE: 70 MMHG

## 2022-07-13 DIAGNOSIS — Z3A.33 33 WEEKS GESTATION OF PREGNANCY: ICD-10-CM

## 2022-07-13 DIAGNOSIS — Z13.89 SCREENING FOR BLOOD OR PROTEIN IN URINE: Primary | ICD-10-CM

## 2022-07-13 LAB
GLUCOSE UR STRIP-MCNC: NEGATIVE MG/DL
PROT UR STRIP-MCNC: NEGATIVE MG/DL

## 2022-07-13 PROCEDURE — 0502F SUBSEQUENT PRENATAL CARE: CPT | Performed by: OBSTETRICS & GYNECOLOGY

## 2022-07-13 RX ORDER — CALCIUM CARBONATE 200(500)MG
1 TABLET,CHEWABLE ORAL DAILY
COMMUNITY
End: 2022-10-10

## 2022-07-14 NOTE — PROGRESS NOTES
Chief Complaint   Patient presents with   • Routine Prenatal Visit     Ob visit with ultrasound     Viry Theodore is doing well, normal fetal movement. occ BHC, nothing regular. No LOF or VB.  abnl 1 hour however all three values of three hour were normal.  Overall EFW 71% however large AC and plan to repeat growth US    FU 2 wks with GBS    Saritha Nelson MD  7/14/2022  14:37 EDT

## 2022-08-01 ENCOUNTER — ROUTINE PRENATAL (OUTPATIENT)
Dept: OBSTETRICS AND GYNECOLOGY | Age: 27
End: 2022-08-01

## 2022-08-01 VITALS — DIASTOLIC BLOOD PRESSURE: 72 MMHG | SYSTOLIC BLOOD PRESSURE: 120 MMHG | BODY MASS INDEX: 31.55 KG/M2 | WEIGHT: 183.8 LBS

## 2022-08-01 DIAGNOSIS — Z13.89 SCREENING FOR BLOOD OR PROTEIN IN URINE: ICD-10-CM

## 2022-08-01 DIAGNOSIS — Z36.85 ANTENATAL SCREENING FOR STREPTOCOCCUS B: ICD-10-CM

## 2022-08-01 LAB
GLUCOSE UR STRIP-MCNC: NEGATIVE MG/DL
PROT UR STRIP-MCNC: NEGATIVE MG/DL

## 2022-08-01 PROCEDURE — 0502F SUBSEQUENT PRENATAL CARE: CPT | Performed by: OBSTETRICS & GYNECOLOGY

## 2022-08-01 NOTE — PROGRESS NOTES
Chief Complaint   Patient presents with   • Routine Prenatal Visit     36w2d OB Check with GBS.      Viryninfa Loredo Theodore is doing well, does not think she is having any contractions  Normal fetal movement noted  GBS collected today

## 2022-08-01 NOTE — PROGRESS NOTES
Chief Complaint   Patient presents with   • Routine Prenatal Visit     36w2d OB Check with GBS.      Viry Theodore is doing well, no current complaints. Has some ctx for about 1.5 hr but they resolved. No LOF or VB  Cervix still closed  Discussed Vit K, hep B, eye ointment  GBS collected  Large AC last time, growth next visit    Saritha Nelson MD  8/1/2022  10:44 EDT

## 2022-08-03 LAB — GP B STREP DNA SPEC QL NAA+PROBE: NEGATIVE

## 2022-08-08 ENCOUNTER — ROUTINE PRENATAL (OUTPATIENT)
Dept: OBSTETRICS AND GYNECOLOGY | Age: 27
End: 2022-08-08

## 2022-08-08 ENCOUNTER — TELEPHONE (OUTPATIENT)
Dept: OBSTETRICS AND GYNECOLOGY | Age: 27
End: 2022-08-08

## 2022-08-08 VITALS — SYSTOLIC BLOOD PRESSURE: 120 MMHG | BODY MASS INDEX: 31.65 KG/M2 | WEIGHT: 184.4 LBS | DIASTOLIC BLOOD PRESSURE: 70 MMHG

## 2022-08-08 DIAGNOSIS — Z34.00 SUPERVISION OF NORMAL FIRST PREGNANCY, ANTEPARTUM: ICD-10-CM

## 2022-08-08 DIAGNOSIS — Z13.89 SCREENING FOR BLOOD OR PROTEIN IN URINE: Primary | ICD-10-CM

## 2022-08-08 LAB
GLUCOSE UR STRIP-MCNC: NEGATIVE MG/DL
PROT UR STRIP-MCNC: NEGATIVE MG/DL

## 2022-08-08 PROCEDURE — 0502F SUBSEQUENT PRENATAL CARE: CPT | Performed by: OBSTETRICS & GYNECOLOGY

## 2022-08-08 NOTE — PROGRESS NOTES
Chief Complaint   Patient presents with   • Routine Prenatal Visit     37w2d OB Check with US      Viry Theodore is doing well, had some ctx over the weekend but only lasted about 45 min. Having some mucous plug. No ctx now.  Interested in elective induction of labor.   GBS negative  Reviewed labor precuations  Prior concern for LGA but growth today above average but no macrosomia    FU 1 wk    Saritha Nelson MD  8/8/2022  12:58 EDT

## 2022-08-08 NOTE — TELEPHONE ENCOUNTER
Provider: DR. YEUNG     Caller: SAUMYA KIRBY    Relationship to Patient: SELF    Pharmacy: [facility] @ [address] [facility] - #[phone]    Phone Number: [phone]    Reason for Call: PT SAYS [facility] HAS DENIED HER INSURANCE FOR THE BREAST PUMP THAT DR. YEUNG RECOMMENDED. IS THERE ANOTHER PLACE SHE CAN GET ONE FROM?  PLS CALL PT.    When was the patient last seen: 8-8-22

## 2022-08-08 NOTE — TELEPHONE ENCOUNTER
Called [employer]- she states due to pts insuracne, pt will have to call her insurance company and see what pump they will cover and how she can receive it. Pt notified, will let us know if she needs any further assistance from us.

## 2022-08-15 ENCOUNTER — ROUTINE PRENATAL (OUTPATIENT)
Dept: OBSTETRICS AND GYNECOLOGY | Age: 27
End: 2022-08-15

## 2022-08-15 ENCOUNTER — PREP FOR SURGERY (OUTPATIENT)
Dept: OTHER | Facility: HOSPITAL | Age: 27
End: 2022-08-15

## 2022-08-15 VITALS — DIASTOLIC BLOOD PRESSURE: 72 MMHG | WEIGHT: 188 LBS | BODY MASS INDEX: 32.27 KG/M2 | SYSTOLIC BLOOD PRESSURE: 122 MMHG

## 2022-08-15 DIAGNOSIS — Z34.00 SUPERVISION OF NORMAL FIRST PREGNANCY, ANTEPARTUM: Primary | ICD-10-CM

## 2022-08-15 DIAGNOSIS — Z13.89 SCREENING FOR BLOOD OR PROTEIN IN URINE: Primary | ICD-10-CM

## 2022-08-15 DIAGNOSIS — Z34.00 SUPERVISION OF NORMAL FIRST PREGNANCY, ANTEPARTUM: ICD-10-CM

## 2022-08-15 LAB
GLUCOSE UR STRIP-MCNC: NEGATIVE MG/DL
PROT UR STRIP-MCNC: NEGATIVE MG/DL

## 2022-08-15 PROCEDURE — 0502F SUBSEQUENT PRENATAL CARE: CPT | Performed by: OBSTETRICS & GYNECOLOGY

## 2022-08-15 RX ORDER — MISOPROSTOL 200 UG/1
800 TABLET ORAL AS NEEDED
Status: CANCELLED | OUTPATIENT
Start: 2022-08-15

## 2022-08-15 RX ORDER — TERBUTALINE SULFATE 1 MG/ML
0.25 INJECTION, SOLUTION SUBCUTANEOUS AS NEEDED
Status: CANCELLED | OUTPATIENT
Start: 2022-08-15

## 2022-08-15 RX ORDER — IBUPROFEN 600 MG/1
600 TABLET ORAL EVERY 6 HOURS PRN
Status: CANCELLED | OUTPATIENT
Start: 2022-08-15

## 2022-08-15 RX ORDER — SODIUM CHLORIDE 0.9 % (FLUSH) 0.9 %
10 SYRINGE (ML) INJECTION EVERY 12 HOURS SCHEDULED
Status: CANCELLED | OUTPATIENT
Start: 2022-08-15

## 2022-08-15 RX ORDER — ONDANSETRON 2 MG/ML
4 INJECTION INTRAMUSCULAR; INTRAVENOUS EVERY 6 HOURS PRN
Status: CANCELLED | OUTPATIENT
Start: 2022-08-15

## 2022-08-15 RX ORDER — LIDOCAINE HYDROCHLORIDE 10 MG/ML
5 INJECTION, SOLUTION EPIDURAL; INFILTRATION; INTRACAUDAL; PERINEURAL AS NEEDED
Status: CANCELLED | OUTPATIENT
Start: 2022-08-15

## 2022-08-15 RX ORDER — METOCLOPRAMIDE HYDROCHLORIDE 5 MG/ML
10 INJECTION INTRAMUSCULAR; INTRAVENOUS EVERY 6 HOURS PRN
Status: CANCELLED | OUTPATIENT
Start: 2022-08-15

## 2022-08-15 RX ORDER — OXYTOCIN-SODIUM CHLORIDE 0.9% IV SOLN 30 UNIT/500ML 30-0.9/5 UT/ML-%
999 SOLUTION INTRAVENOUS ONCE
Status: CANCELLED | OUTPATIENT
Start: 2022-08-15 | End: 2022-08-15

## 2022-08-15 RX ORDER — ONDANSETRON 4 MG/1
4 TABLET, FILM COATED ORAL EVERY 6 HOURS PRN
Status: CANCELLED | OUTPATIENT
Start: 2022-08-15

## 2022-08-15 RX ORDER — METHYLERGONOVINE MALEATE 0.2 MG/ML
200 INJECTION INTRAVENOUS ONCE AS NEEDED
Status: CANCELLED | OUTPATIENT
Start: 2022-08-15

## 2022-08-15 RX ORDER — SODIUM CHLORIDE, SODIUM LACTATE, POTASSIUM CHLORIDE, CALCIUM CHLORIDE 600; 310; 30; 20 MG/100ML; MG/100ML; MG/100ML; MG/100ML
125 INJECTION, SOLUTION INTRAVENOUS CONTINUOUS
Status: CANCELLED | OUTPATIENT
Start: 2022-08-15

## 2022-08-15 RX ORDER — MAGNESIUM CARB/ALUMINUM HYDROX 105-160MG
30 TABLET,CHEWABLE ORAL ONCE
Status: CANCELLED | OUTPATIENT
Start: 2022-08-15 | End: 2022-08-15

## 2022-08-15 RX ORDER — SODIUM CHLORIDE 0.9 % (FLUSH) 0.9 %
1-10 SYRINGE (ML) INJECTION AS NEEDED
Status: CANCELLED | OUTPATIENT
Start: 2022-08-15

## 2022-08-15 RX ORDER — CARBOPROST TROMETHAMINE 250 UG/ML
250 INJECTION, SOLUTION INTRAMUSCULAR AS NEEDED
Status: CANCELLED | OUTPATIENT
Start: 2022-08-15

## 2022-08-15 RX ORDER — OXYTOCIN-SODIUM CHLORIDE 0.9% IV SOLN 30 UNIT/500ML 30-0.9/5 UT/ML-%
250 SOLUTION INTRAVENOUS CONTINUOUS PRN
Status: CANCELLED | OUTPATIENT
Start: 2022-08-15 | End: 2022-08-15

## 2022-08-15 RX ORDER — CALCIUM CARBONATE 200(500)MG
2 TABLET,CHEWABLE ORAL 3 TIMES DAILY PRN
Status: CANCELLED | OUTPATIENT
Start: 2022-08-15

## 2022-08-15 NOTE — PROGRESS NOTES
Chief Complaint   Patient presents with   • Routine Prenatal Visit     38w2d OB Check      Viry Theodore is doing well, no complaints.  She notes normal fetal movement still.  She has induction of labor scheduled later on this week.  Cervix is not favorable, still fingertip.  Discussed need for cervical ripening and longer induction, plan to have her come in the evening before    Saritha Nelson MD  8/15/2022  11:11 EDT

## 2022-08-20 ENCOUNTER — ANESTHESIA (OUTPATIENT)
Dept: LABOR AND DELIVERY | Facility: HOSPITAL | Age: 27
End: 2022-08-20

## 2022-08-20 ENCOUNTER — ANESTHESIA EVENT (OUTPATIENT)
Dept: LABOR AND DELIVERY | Facility: HOSPITAL | Age: 27
End: 2022-08-20

## 2022-08-20 ENCOUNTER — HOSPITAL ENCOUNTER (INPATIENT)
Facility: HOSPITAL | Age: 27
LOS: 2 days | Discharge: HOME | End: 2022-08-22
Attending: OBSTETRICS & GYNECOLOGY | Admitting: OBSTETRICS & GYNECOLOGY

## 2022-08-20 ENCOUNTER — HOSPITAL ENCOUNTER (OUTPATIENT)
Dept: LABOR AND DELIVERY | Facility: HOSPITAL | Age: 27
Discharge: HOME | End: 2022-08-20

## 2022-08-20 DIAGNOSIS — Z34.00 SUPERVISION OF NORMAL FIRST PREGNANCY, ANTEPARTUM: ICD-10-CM

## 2022-08-20 PROBLEM — Z34.93 SUPERVISION OF LOW-RISK PREGNANCY, THIRD TRIMESTER: Status: ACTIVE | Noted: 2022-08-20

## 2022-08-20 LAB
ABO GROUP BLD: NORMAL
BLD GP AB SCN SERPL QL: NEGATIVE
DEPRECATED RDW RBC AUTO: 45.1 FL (ref 37–54)
ERYTHROCYTE [DISTWIDTH] IN BLOOD BY AUTOMATED COUNT: 12.4 % (ref 12.3–15.4)
HCT VFR BLD AUTO: 37.9 % (ref 34–46.6)
HGB BLD-MCNC: 12.7 G/DL (ref 12–15.9)
MCH RBC QN AUTO: 33.3 PG (ref 26.6–33)
MCHC RBC AUTO-ENTMCNC: 33.5 G/DL (ref 31.5–35.7)
MCV RBC AUTO: 99.5 FL (ref 79–97)
PLATELET # BLD AUTO: 216 10*3/MM3 (ref 140–450)
PMV BLD AUTO: 10.7 FL (ref 6–12)
RBC # BLD AUTO: 3.81 10*6/MM3 (ref 3.77–5.28)
RH BLD: POSITIVE
SARS-COV-2 RNA RESP QL NAA+PROBE: NOT DETECTED
T&S EXPIRATION DATE: NORMAL
WBC NRBC COR # BLD: 9.55 10*3/MM3 (ref 3.4–10.8)

## 2022-08-20 PROCEDURE — U0003 INFECTIOUS AGENT DETECTION BY NUCLEIC ACID (DNA OR RNA); SEVERE ACUTE RESPIRATORY SYNDROME CORONAVIRUS 2 (SARS-COV-2) (CORONAVIRUS DISEASE [COVID-19]), AMPLIFIED PROBE TECHNIQUE, MAKING USE OF HIGH THROUGHPUT TECHNOLOGIES AS DESCRIBED BY CMS-2020-01-R: HCPCS | Performed by: OBSTETRICS & GYNECOLOGY

## 2022-08-20 PROCEDURE — 3E0P7VZ INTRODUCTION OF HORMONE INTO FEMALE REPRODUCTIVE, VIA NATURAL OR ARTIFICIAL OPENING: ICD-10-PCS | Performed by: OBSTETRICS & GYNECOLOGY

## 2022-08-20 PROCEDURE — C1755 CATHETER, INTRASPINAL: HCPCS | Performed by: ANESTHESIOLOGY

## 2022-08-20 PROCEDURE — 85027 COMPLETE CBC AUTOMATED: CPT | Performed by: OBSTETRICS & GYNECOLOGY

## 2022-08-20 PROCEDURE — 25010000002 ONDANSETRON PER 1 MG: Performed by: OBSTETRICS & GYNECOLOGY

## 2022-08-20 PROCEDURE — 86900 BLOOD TYPING SEROLOGIC ABO: CPT | Performed by: OBSTETRICS & GYNECOLOGY

## 2022-08-20 PROCEDURE — 86850 RBC ANTIBODY SCREEN: CPT | Performed by: OBSTETRICS & GYNECOLOGY

## 2022-08-20 PROCEDURE — 0 LIDOCAINE 1 % SOLUTION: Performed by: ANESTHESIOLOGY

## 2022-08-20 PROCEDURE — 86901 BLOOD TYPING SEROLOGIC RH(D): CPT | Performed by: OBSTETRICS & GYNECOLOGY

## 2022-08-20 PROCEDURE — 25010000002 BUTORPHANOL PER 1 MG: Performed by: OBSTETRICS & GYNECOLOGY

## 2022-08-20 PROCEDURE — 3E033VJ INTRODUCTION OF OTHER HORMONE INTO PERIPHERAL VEIN, PERCUTANEOUS APPROACH: ICD-10-PCS | Performed by: OBSTETRICS & GYNECOLOGY

## 2022-08-20 RX ORDER — CARBOPROST TROMETHAMINE 250 UG/ML
250 INJECTION, SOLUTION INTRAMUSCULAR AS NEEDED
Status: DISCONTINUED | OUTPATIENT
Start: 2022-08-20 | End: 2022-08-21 | Stop reason: HOSPADM

## 2022-08-20 RX ORDER — METOCLOPRAMIDE HYDROCHLORIDE 5 MG/ML
10 INJECTION INTRAMUSCULAR; INTRAVENOUS EVERY 6 HOURS PRN
Status: DISCONTINUED | OUTPATIENT
Start: 2022-08-20 | End: 2022-08-21 | Stop reason: HOSPADM

## 2022-08-20 RX ORDER — LIDOCAINE HYDROCHLORIDE 10 MG/ML
5 INJECTION, SOLUTION EPIDURAL; INFILTRATION; INTRACAUDAL; PERINEURAL AS NEEDED
Status: DISCONTINUED | OUTPATIENT
Start: 2022-08-20 | End: 2022-08-21 | Stop reason: HOSPADM

## 2022-08-20 RX ORDER — LIDOCAINE HYDROCHLORIDE 10 MG/ML
INJECTION, SOLUTION INFILTRATION; PERINEURAL AS NEEDED
Status: DISCONTINUED | OUTPATIENT
Start: 2022-08-20 | End: 2022-08-20 | Stop reason: SURG

## 2022-08-20 RX ORDER — IBUPROFEN 600 MG/1
600 TABLET ORAL EVERY 6 HOURS PRN
Status: DISCONTINUED | OUTPATIENT
Start: 2022-08-20 | End: 2022-08-21

## 2022-08-20 RX ORDER — ONDANSETRON 4 MG/1
4 TABLET, FILM COATED ORAL EVERY 6 HOURS PRN
Status: DISCONTINUED | OUTPATIENT
Start: 2022-08-20 | End: 2022-08-21 | Stop reason: HOSPADM

## 2022-08-20 RX ORDER — FAMOTIDINE 10 MG/ML
20 INJECTION, SOLUTION INTRAVENOUS ONCE AS NEEDED
Status: DISCONTINUED | OUTPATIENT
Start: 2022-08-20 | End: 2022-08-21 | Stop reason: HOSPADM

## 2022-08-20 RX ORDER — OXYTOCIN/0.9 % SODIUM CHLORIDE 30/500 ML
2-20 PLASTIC BAG, INJECTION (ML) INTRAVENOUS
Status: DISCONTINUED | OUTPATIENT
Start: 2022-08-20 | End: 2022-08-21

## 2022-08-20 RX ORDER — SODIUM CHLORIDE 0.9 % (FLUSH) 0.9 %
1-10 SYRINGE (ML) INJECTION AS NEEDED
Status: DISCONTINUED | OUTPATIENT
Start: 2022-08-20 | End: 2022-08-21 | Stop reason: HOSPADM

## 2022-08-20 RX ORDER — MISOPROSTOL 200 UG/1
800 TABLET ORAL AS NEEDED
Status: DISCONTINUED | OUTPATIENT
Start: 2022-08-20 | End: 2022-08-21 | Stop reason: HOSPADM

## 2022-08-20 RX ORDER — EPHEDRINE SULFATE 50 MG/ML
5 INJECTION, SOLUTION INTRAVENOUS AS NEEDED
Status: DISCONTINUED | OUTPATIENT
Start: 2022-08-20 | End: 2022-08-21 | Stop reason: HOSPADM

## 2022-08-20 RX ORDER — ONDANSETRON 2 MG/ML
4 INJECTION INTRAMUSCULAR; INTRAVENOUS EVERY 6 HOURS PRN
Status: DISCONTINUED | OUTPATIENT
Start: 2022-08-20 | End: 2022-08-21 | Stop reason: HOSPADM

## 2022-08-20 RX ORDER — SODIUM CHLORIDE 0.9 % (FLUSH) 0.9 %
10 SYRINGE (ML) INJECTION EVERY 12 HOURS SCHEDULED
Status: DISCONTINUED | OUTPATIENT
Start: 2022-08-20 | End: 2022-08-21 | Stop reason: HOSPADM

## 2022-08-20 RX ORDER — DIPHENHYDRAMINE HYDROCHLORIDE 50 MG/ML
12.5 INJECTION INTRAMUSCULAR; INTRAVENOUS EVERY 8 HOURS PRN
Status: DISCONTINUED | OUTPATIENT
Start: 2022-08-20 | End: 2022-08-21 | Stop reason: HOSPADM

## 2022-08-20 RX ORDER — SODIUM CHLORIDE, SODIUM LACTATE, POTASSIUM CHLORIDE, CALCIUM CHLORIDE 600; 310; 30; 20 MG/100ML; MG/100ML; MG/100ML; MG/100ML
125 INJECTION, SOLUTION INTRAVENOUS CONTINUOUS
Status: DISCONTINUED | OUTPATIENT
Start: 2022-08-20 | End: 2022-08-21

## 2022-08-20 RX ORDER — FENTANYL CIT 0.2 MG/100ML-ROPIV 0.2%-NACL 0.9% EPIDURAL INJ 2/0.2 MCG/ML-%
10 SOLUTION INJECTION CONTINUOUS
Status: DISCONTINUED | OUTPATIENT
Start: 2022-08-20 | End: 2022-08-21

## 2022-08-20 RX ORDER — OXYTOCIN/0.9 % SODIUM CHLORIDE 30/500 ML
999 PLASTIC BAG, INJECTION (ML) INTRAVENOUS ONCE
Status: DISCONTINUED | OUTPATIENT
Start: 2022-08-21 | End: 2022-08-21 | Stop reason: HOSPADM

## 2022-08-20 RX ORDER — ONDANSETRON 2 MG/ML
4 INJECTION INTRAMUSCULAR; INTRAVENOUS ONCE AS NEEDED
Status: DISCONTINUED | OUTPATIENT
Start: 2022-08-20 | End: 2022-08-21 | Stop reason: HOSPADM

## 2022-08-20 RX ORDER — MAGNESIUM CARB/ALUMINUM HYDROX 105-160MG
30 TABLET,CHEWABLE ORAL ONCE
Status: COMPLETED | OUTPATIENT
Start: 2022-08-20 | End: 2022-08-20

## 2022-08-20 RX ORDER — TERBUTALINE SULFATE 1 MG/ML
0.25 INJECTION, SOLUTION SUBCUTANEOUS AS NEEDED
Status: DISCONTINUED | OUTPATIENT
Start: 2022-08-20 | End: 2022-08-21 | Stop reason: HOSPADM

## 2022-08-20 RX ORDER — METHYLERGONOVINE MALEATE 0.2 MG/ML
200 INJECTION INTRAVENOUS ONCE AS NEEDED
Status: DISCONTINUED | OUTPATIENT
Start: 2022-08-20 | End: 2022-08-21 | Stop reason: HOSPADM

## 2022-08-20 RX ORDER — CALCIUM CARBONATE 200(500)MG
2 TABLET,CHEWABLE ORAL 3 TIMES DAILY PRN
Status: DISCONTINUED | OUTPATIENT
Start: 2022-08-20 | End: 2022-08-21 | Stop reason: HOSPADM

## 2022-08-20 RX ORDER — OXYTOCIN/0.9 % SODIUM CHLORIDE 30/500 ML
250 PLASTIC BAG, INJECTION (ML) INTRAVENOUS CONTINUOUS PRN
Status: DISCONTINUED | OUTPATIENT
Start: 2022-08-21 | End: 2022-08-21 | Stop reason: HOSPADM

## 2022-08-20 RX ADMIN — EPHEDRINE SULFATE 5 MG: 50 INJECTION INTRAVENOUS at 15:51

## 2022-08-20 RX ADMIN — Medication 2 MILLI-UNITS/MIN: at 12:02

## 2022-08-20 RX ADMIN — EPHEDRINE SULFATE 5 MG: 50 INJECTION INTRAVENOUS at 15:58

## 2022-08-20 RX ADMIN — SODIUM CHLORIDE, POTASSIUM CHLORIDE, SODIUM LACTATE AND CALCIUM CHLORIDE 125 ML/HR: 600; 310; 30; 20 INJECTION, SOLUTION INTRAVENOUS at 01:28

## 2022-08-20 RX ADMIN — SODIUM CHLORIDE, POTASSIUM CHLORIDE, SODIUM LACTATE AND CALCIUM CHLORIDE 125 ML/HR: 600; 310; 30; 20 INJECTION, SOLUTION INTRAVENOUS at 07:11

## 2022-08-20 RX ADMIN — ONDANSETRON 4 MG: 2 INJECTION INTRAMUSCULAR; INTRAVENOUS at 23:17

## 2022-08-20 RX ADMIN — SODIUM CHLORIDE, POTASSIUM CHLORIDE, SODIUM LACTATE AND CALCIUM CHLORIDE 999 ML/HR: 600; 310; 30; 20 INJECTION, SOLUTION INTRAVENOUS at 15:57

## 2022-08-20 RX ADMIN — LIDOCAINE HYDROCHLORIDE 5 ML: 10 INJECTION, SOLUTION INFILTRATION; PERINEURAL at 14:12

## 2022-08-20 RX ADMIN — MINERAL OIL 30 ML: 1000 SOLUTION ORAL at 23:37

## 2022-08-20 RX ADMIN — Medication 10 ML/HR: at 23:25

## 2022-08-20 RX ADMIN — LIDOCAINE HYDROCHLORIDE 4 ML: 10; .005 INJECTION, SOLUTION EPIDURAL; INFILTRATION; INTRACAUDAL; PERINEURAL at 14:08

## 2022-08-20 RX ADMIN — EPHEDRINE SULFATE 5 MG: 50 INJECTION INTRAVENOUS at 15:52

## 2022-08-20 RX ADMIN — SODIUM CHLORIDE, POTASSIUM CHLORIDE, SODIUM LACTATE AND CALCIUM CHLORIDE 125 ML/HR: 600; 310; 30; 20 INJECTION, SOLUTION INTRAVENOUS at 20:26

## 2022-08-20 RX ADMIN — Medication 10 ML/HR: at 14:15

## 2022-08-20 RX ADMIN — SODIUM CHLORIDE, POTASSIUM CHLORIDE, SODIUM LACTATE AND CALCIUM CHLORIDE 1000 ML: 600; 310; 30; 20 INJECTION, SOLUTION INTRAVENOUS at 14:04

## 2022-08-20 RX ADMIN — BUTORPHANOL TARTRATE 1 MG: 2 INJECTION, SOLUTION INTRAMUSCULAR; INTRAVENOUS at 12:52

## 2022-08-20 RX ADMIN — DINOPROSTONE 10 MG: 10 INSERT VAGINAL at 02:15

## 2022-08-20 RX ADMIN — EPHEDRINE SULFATE 5 MG: 50 INJECTION INTRAVENOUS at 15:56

## 2022-08-20 NOTE — ANESTHESIA PREPROCEDURE EVALUATION
Anesthesia Evaluation                  Airway   Mallampati: II  Dental      Pulmonary    Cardiovascular         Neuro/Psych  GI/Hepatic/Renal/Endo      Musculoskeletal     Abdominal    Substance History      OB/GYN    (+) Pregnant,     Comment: 39 wks 0 days      Other                        Anesthesia Plan    ASA 2     epidural     (39w0d  )    Anesthetic plan, risks, benefits, and alternatives have been provided, discussed and informed consent has been obtained with: patient.        CODE STATUS:    Level Of Support Discussed With: Patient  Code Status (Patient has no pulse and is not breathing): CPR (Attempt to Resuscitate)  Medical Interventions (Patient has pulse or is breathing): Full Support

## 2022-08-20 NOTE — PLAN OF CARE
Goal Outcome Evaluation:  Plan of Care Reviewed With: patient, spouse, family        Progress: improving  Outcome Evaluation: Pt on 6mU of pitocin ncurrently. SROM with clear fluid at 1522 today, alexander bulb induction came out at that time. Epidural in and pt comfortable. Hypotension noted after throne position, ephedrine iv given and aa turned rate down. Cont with POC.  Avinash Haro RN

## 2022-08-20 NOTE — H&P
[facility]  Obstetric History and Physical    Chief Complaint   Patient presents with   • Scheduled Induction     +FM. Denies LOF or VB       Subjective     Patient is a 27 y.o. female  currently at 39w0d, who presents for elective induction at term.    Her prenatal care is complicated by abnl 1 hr GTT, normal 3 hr.  Her previous obstetric/gynecological history is non-contributory.    The following portions of the patients history were reviewed and updated as appropriate: current medications, allergies, past medical history, past surgical history, problem list and OB hx .       Prenatal Information:       Prenatal Labs for [facility] Patients  Lab Results   Component Value Date    HGB 12.7 2022    HEPBSAG Negative 2021    ABSCRN Negative 2022    IPJ4TPE7 Non Reactive 2021    HEPCVIRUSABY <0.1 2021       Prenatal Labs -- transcribed from paper records by Nurse  Lab Results   Component Value Date    ABO A 2022    RH Positive 2022    HEPBSAG Negative 2021    RPR Non Reactive 2021    [id number] Non Reactive 2021           Past OB History:       OB History    Para Term  AB Living   2 0 0 0 1 0   SAB IAB Ectopic Molar Multiple Live Births   0 0 0 0 0 0      # Outcome Date GA Lbr William/2nd Weight Sex Delivery Anes PTL Lv   2 Current            1 AB      TAB          Past Medical History: Past Medical History:   Diagnosis Date   • Dysmenorrhea    • Migraine    • Raynaud's syndrome    • UTI (urinary tract infection)       Past Surgical History Past Surgical History:   Procedure Laterality Date   • KNEE ACL RECONSTRUCTION     • WISDOM TOOTH EXTRACTION        Family History: Family History   Problem Relation Age of Onset   • Breast cancer Mother    • Endometriosis Maternal Grandmother    • Melanoma Maternal Grandmother    • Ovarian cancer Neg Hx    • Uterine cancer Neg Hx    • Colon cancer Neg Hx    • Pancreatic cancer Neg Hx     • Congenital heart disease Neg Hx    • Cystic fibrosis Neg Hx       Social History:  reports that she has never smoked. She has never used smokeless tobacco.   reports previous alcohol use of about 3.0 standard drinks of alcohol per week.   reports no history of drug use.        General ROS: Pertinent items are noted in HPI, all other systems reviewed and negative    Objective       Vital Signs Range for the last 24 hours  Temperature: Temp:  [98.2 °F (36.8 °C)] 98.2 °F (36.8 °C)   Temp Source: Temp src: Oral   BP: BP: (102-115)/(63-83) 113/83   Pulse: Heart Rate:  [] 96   Respirations: Resp:  [16-18] 16   SPO2: SpO2:  [99 %] 99 %   O2 Amount (l/min):     O2 Devices     Weight: Weight:  [85.8 kg (189 lb 3.2 oz)] 85.8 kg (189 lb 3.2 oz)     Physical Examination: General appearance - alert, well appearing, and in no distress  Chest - no tachypnea, retractions or cyanosis  Abdomen - fundus soft, nontender.  Pelvic -   Adequate pelvimetry  Extremities - no pedal edema noted, no calf TTP  Skin - normal coloration and turgor, no rashes, no suspicious skin lesions noted    Presentation: Vertex by suture palpation   Cervix: Exam by: Method: sterile exam per physician   Dilation: Cervical Dilation (cm): 1   Effacement: Cervical Effacement: 50%   Station: Fetal Station: -2       Fetal Heart Rate Assessment   Method: Fetal HR Assessment Method: external   Beats/min: Fetal HR (beats/min): 135   Baseline: Fetal HR Baseline: normal range   Varibility: Fetal HR Variability: moderate (amplitude range 6 to 25 bpm)   Accels: Fetal HR Accelerations: greater than/equal to 15 bpm, lasting at least 15 seconds   Decels: Fetal HR Decelerations: absent   Tracing Category:       Uterine Assessment   Method: Method: palpation, external tocotransducer   Frequency (min): Contraction Frequency (Minutes): 2-4   Ctx Count in 10 min:     Duration:     Intensity: Contraction Intensity: mild by palpation   Intensity by IUPC:     Resting Tone:  Uterine Resting Tone: soft by palpation   Resting Tone by IUPC:     [place] Units:       Results from last 7 days   Lab Units 08/20/22  0123   WBC 10*3/mm3 9.55   HEMOGLOBIN g/dL 12.7   HEMATOCRIT % 37.9   PLATELETS 10*3/mm3 216       Assessment & Plan       Supervision of normal first pregnancy, antepartum    Supervision of low-risk pregnancy, third trimester        Assessment:  1.  Intrauterine pregnancy at 39w0d weeks gestation with reactive fetal status.    2.  Term elective induction  3.  Obstetrical history benign  4.  GBS status: negative      Plan:  1. S/p cervidil, cook catheter placed just now. Plan to start pitocin  2. Plan of care has been reviewed with patient and her   3.  Risks, benefits of treatment plan have been discussed.  4.  All questions have been answered.      Saritha Nelson MD  08/20/2022  11:42 EDT

## 2022-08-20 NOTE — PLAN OF CARE
Goal Outcome Evaluation:  Plan of Care Reviewed With: patient        Progress: improving  Outcome Evaluation: Pt an elective cervidil induction. Cervidil was placed at 0215 with plans of pitocin once cervidil is removed. Pt has been renetta irregularly stating that all she feels is tightness and has not requested any pain medication interventions.

## 2022-08-20 NOTE — ANESTHESIA PROCEDURE NOTES
Labor Epidural      Patient reassessed immediately prior to procedure    Patient location during procedure: OB  Performed By  Anesthesiologist: Steve Haro MD  Preanesthetic Checklist  Completed: patient identified, IV checked, site marked, risks and benefits discussed, surgical consent, monitors and equipment checked, pre-op evaluation and timeout performed  Additional Notes  Test dose 3 cc 1.5% lidocaine with epi.  Second dose 5cc 1% lidocaine then continuous infusion o.2% Ropivicaine with 2 Mics fentanyl per cc at 10 cc hr, with  6cc PCA, 15 min lockout  Prep:  Pt Position:left lateral decubitus  Sterile Tech:gloves, cap, mask and sterile barrier  Prep:chlorhexidine gluconate and isopropyl alcohol  Monitoring:blood pressure monitoring, continuous pulse oximetry and EKG  Epidural Block Procedure:  Approach:midline  Guidance:landmark technique  Location:L4-L5  Needle Type:Tuohy  Needle Gauge:18  Loss of Resistance Medium: air  Paresthesia: none  Aspiration:negative  Test Dose:negative  Number of Attempts: 1  Post Assessment:  Dressing:occlusive dressing applied and secured with tape  Pt Tolerance:patient tolerated the procedure well with no apparent complications  Complications:no

## 2022-08-21 LAB
ATMOSPHERIC PRESS: 753.2 MMHG
BASE EXCESS BLDCOV CALC-SCNC: -6.5 MMOL/L (ref -30–30)
BDY SITE: ABNORMAL
COLLECT TME SMN: ABNORMAL
HCO3 BLDCOV-SCNC: 19.1 MMOL/L
INHALED O2 CONCENTRATION: 21 %
MODALITY: ABNORMAL
NOTE: ABNORMAL
PCO2 BLDCOV: 37.7 MM HG (ref 35–51.3)
PH BLDCOV: 7.31 PH UNITS (ref 7.26–7.4)
PO2 BLDCOV: 39.2 MM HG (ref 19–39)
SAO2 % BLDCOA: 69.3 % (ref 92–99)
SAO2 % BLDCOV: ABNORMAL %
VENTILATOR MODE: ABNORMAL

## 2022-08-21 PROCEDURE — 59400 OBSTETRICAL CARE: CPT | Performed by: OBSTETRICS & GYNECOLOGY

## 2022-08-21 PROCEDURE — 0KQM0ZZ REPAIR PERINEUM MUSCLE, OPEN APPROACH: ICD-10-PCS | Performed by: OBSTETRICS & GYNECOLOGY

## 2022-08-21 PROCEDURE — 82803 BLOOD GASES ANY COMBINATION: CPT

## 2022-08-21 RX ORDER — DIPHENHYDRAMINE HCL 25 MG
25 CAPSULE ORAL NIGHTLY PRN
Status: DISCONTINUED | OUTPATIENT
Start: 2022-08-21 | End: 2022-08-22 | Stop reason: HOSPADM

## 2022-08-21 RX ORDER — BISACODYL 10 MG
10 SUPPOSITORY, RECTAL RECTAL DAILY PRN
Status: DISCONTINUED | OUTPATIENT
Start: 2022-08-22 | End: 2022-08-22 | Stop reason: HOSPADM

## 2022-08-21 RX ORDER — PHYTONADIONE 1 MG/.5ML
INJECTION, EMULSION INTRAMUSCULAR; INTRAVENOUS; SUBCUTANEOUS
Status: ACTIVE
Start: 2022-08-21 | End: 2022-08-21

## 2022-08-21 RX ORDER — IBUPROFEN 800 MG/1
800 TABLET ORAL EVERY 8 HOURS PRN
Status: DISCONTINUED | OUTPATIENT
Start: 2022-08-21 | End: 2022-08-22 | Stop reason: HOSPADM

## 2022-08-21 RX ORDER — PRENATAL VIT/IRON FUM/FOLIC AC 27MG-0.8MG
1 TABLET ORAL DAILY
Status: DISCONTINUED | OUTPATIENT
Start: 2022-08-21 | End: 2022-08-22 | Stop reason: HOSPADM

## 2022-08-21 RX ORDER — ERYTHROMYCIN 5 MG/G
OINTMENT OPHTHALMIC
Status: ACTIVE
Start: 2022-08-21 | End: 2022-08-21

## 2022-08-21 RX ORDER — DOCUSATE SODIUM 100 MG/1
100 CAPSULE, LIQUID FILLED ORAL 2 TIMES DAILY
Status: DISCONTINUED | OUTPATIENT
Start: 2022-08-21 | End: 2022-08-22 | Stop reason: HOSPADM

## 2022-08-21 RX ORDER — ONDANSETRON 4 MG/1
4 TABLET, FILM COATED ORAL EVERY 8 HOURS PRN
Status: DISCONTINUED | OUTPATIENT
Start: 2022-08-21 | End: 2022-08-22 | Stop reason: HOSPADM

## 2022-08-21 RX ORDER — SODIUM CHLORIDE 0.9 % (FLUSH) 0.9 %
1-10 SYRINGE (ML) INJECTION AS NEEDED
Status: DISCONTINUED | OUTPATIENT
Start: 2022-08-21 | End: 2022-08-22 | Stop reason: HOSPADM

## 2022-08-21 RX ORDER — HYDROCODONE BITARTRATE AND ACETAMINOPHEN 5; 325 MG/1; MG/1
1 TABLET ORAL EVERY 4 HOURS PRN
Status: DISCONTINUED | OUTPATIENT
Start: 2022-08-21 | End: 2022-08-22 | Stop reason: HOSPADM

## 2022-08-21 RX ORDER — OXYTOCIN/0.9 % SODIUM CHLORIDE 30/500 ML
125 PLASTIC BAG, INJECTION (ML) INTRAVENOUS CONTINUOUS PRN
Status: COMPLETED | OUTPATIENT
Start: 2022-08-21 | End: 2022-08-21

## 2022-08-21 RX ORDER — CALCIUM CARBONATE 200(500)MG
2 TABLET,CHEWABLE ORAL 3 TIMES DAILY PRN
Status: DISCONTINUED | OUTPATIENT
Start: 2022-08-21 | End: 2022-08-22 | Stop reason: HOSPADM

## 2022-08-21 RX ORDER — HYDROCORTISONE 25 MG/G
1 CREAM TOPICAL AS NEEDED
Status: DISCONTINUED | OUTPATIENT
Start: 2022-08-21 | End: 2022-08-22 | Stop reason: HOSPADM

## 2022-08-21 RX ADMIN — IBUPROFEN 800 MG: 800 TABLET, FILM COATED ORAL at 18:02

## 2022-08-21 RX ADMIN — DOCUSATE SODIUM 100 MG: 100 CAPSULE, LIQUID FILLED ORAL at 21:24

## 2022-08-21 RX ADMIN — DOCUSATE SODIUM 100 MG: 100 CAPSULE, LIQUID FILLED ORAL at 09:28

## 2022-08-21 RX ADMIN — Medication: at 04:07

## 2022-08-21 RX ADMIN — Medication 125 ML/HR: at 01:06

## 2022-08-21 RX ADMIN — IBUPROFEN 800 MG: 800 TABLET, FILM COATED ORAL at 09:28

## 2022-08-21 RX ADMIN — IBUPROFEN 800 MG: 800 TABLET, FILM COATED ORAL at 01:32

## 2022-08-21 RX ADMIN — Medication 1 TABLET: at 09:28

## 2022-08-21 RX ADMIN — Medication 250 ML/HR: at 00:13

## 2022-08-21 NOTE — LACTATION NOTE
"P1T. Baby latched to right breast in football hold. Patient said baby would not take the left breast but was nursing happily on the right . Explained that newborns often have a \"favorite side\" in the first few days of nursing. Patient was very calm and relaxed and has been nursing baby frequently all day. Baby was positioned well . Enc patient to stay well hydrated and call for LC as needed.   Lactation Consult Note    Evaluation Completed: 2022 17:30 EDT  Patient Name: Viry Theodore  :  1995  MRN:  [id number]     REFERRAL  INFORMATION:                          Date of Referral: 22   Person Making Referral: lactation consultant  Maternal Reason for Referral: breastfeeding currently, no prior breastfeeding experience       DELIVERY HISTORY:        Skin to skin initiation date/time: 2022  11:51 PM   Skin to skin end date/time: 2022  12:50 AM        MATERNAL ASSESSMENT:     Breast Shape: round, pendulous (22)  Breast Density: soft (22)     Nipples: graspable (22)                INFANT ASSESSMENT:  Information for the patient's :  Kenyatta Theodore [[id number]]   No past medical history on file.                                                                                                     MATERNAL INFANT FEEDING:     Maternal Emotional State: independent, receptive, relaxed (22)  Infant Positioning: clutch/football (22)   Signs of Milk Transfer: suck/swallow ratio, transfer present (22)  Pain with Feeding: no (22)           Milk Ejection Reflex: present (22)  Comfort Measures Following Feeding: air-drying encouraged, breast cream/oil applied, soap use discouraged, expressed milk applied (22)        Latch Assistance: none needed (22)                               EQUIPMENT TYPE:  Breast Pump Type: double electric, personal (22)                    "           BREAST PUMPING:          LACTATION REFERRALS:  Lactation Referrals: outpatient lactation program, support group (08/21/22 [id number])

## 2022-08-21 NOTE — PLAN OF CARE
Problem: Adult Inpatient Plan of Care  Goal: Plan of Care Review  Outcome: Ongoing, Progressing  Flowsheets (Taken 8/21/2022 1601)  Progress: improving  Plan of Care Reviewed With:   patient   spouse  Outcome Evaluation: pt vss, pain well controlled with motrin. breastfeeding well.  Goal: Patient-Specific Goal (Individualized)  Outcome: Ongoing, Progressing  Goal: Absence of Hospital-Acquired Illness or Injury  Outcome: Ongoing, Progressing  Intervention: Identify and Manage Fall Risk  Recent Flowsheet Documentation  Taken 8/21/2022 1450 by Ellen Meyers RN  Safety Promotion/Fall Prevention: safety round/check completed  Taken 8/21/2022 1225 by Ellen Meyers RN  Safety Promotion/Fall Prevention: safety round/check completed  Taken 8/21/2022 1049 by Ellen Meyers RN  Safety Promotion/Fall Prevention: safety round/check completed  Taken 8/21/2022 0928 by Ellen Meyers RN  Safety Promotion/Fall Prevention: safety round/check completed  Intervention: Prevent and Manage VTE (Venous Thromboembolism) Risk  Recent Flowsheet Documentation  Taken 8/21/2022 0928 by Ellen Meyers RN  Activity Management:   activity adjusted per tolerance   activity encouraged   up ad tootie  Goal: Optimal Comfort and Wellbeing  Outcome: Ongoing, Progressing  Intervention: Monitor Pain and Promote Comfort  Recent Flowsheet Documentation  Taken 8/21/2022 0928 by Ellen Meyers RN  Pain Management Interventions:   see MAR   quiet environment facilitated   pain management plan reviewed with patient/caregiver  Intervention: Provide Person-Centered Care  Recent Flowsheet Documentation  Taken 8/21/2022 0928 by Ellen Meyers RN  Trust Relationship/Rapport:   care explained   choices provided   emotional support provided   empathic listening provided   questions answered   questions encouraged   reassurance provided   thoughts/feelings acknowledged  Goal: Readiness for Transition of Care  Outcome: Ongoing, Progressing      Problem: Bleeding (Labor)  Goal: Hemostasis  Outcome: Ongoing, Progressing     Problem: Change in Fetal Wellbeing (Labor)  Goal: Stable Fetal Wellbeing  Outcome: Ongoing, Progressing     Problem: Delayed Labor Progression (Labor)  Goal: Effective Progression to Delivery  Outcome: Ongoing, Progressing     Problem: Infection (Labor)  Goal: Absence of Infection Signs and Symptoms  Outcome: Ongoing, Progressing     Problem: Labor Pain (Labor)  Goal: Acceptable Pain Control  Outcome: Ongoing, Progressing     Problem: Uterine Tachysystole (Labor)  Goal: Normal Uterine Contraction Pattern  Outcome: Ongoing, Progressing     Problem:  Fall Injury Risk  Goal: Absence of Fall, Infant Drop and Related Injury  Outcome: Ongoing, Progressing  Intervention: Promote Injury-Free Environment  Recent Flowsheet Documentation  Taken 2022 1450 by Ellen Meyers RN  Safety Promotion/Fall Prevention: safety round/check completed  Taken 2022 1225 by Ellen Meyers RN  Safety Promotion/Fall Prevention: safety round/check completed  Taken 2022 1049 by Ellen Meyers RN  Safety Promotion/Fall Prevention: safety round/check completed  Taken 2022 0928 by Ellen Meyers RN  Safety Promotion/Fall Prevention: safety round/check completed     Problem: Device-Related Complication Risk (Anesthesia/Analgesia, Neuraxial)  Goal: Safe Infusion Delivery Completion  Outcome: Ongoing, Progressing  Goal: Safe Infusion Delivery Completion  Outcome: Ongoing, Progressing     Problem: Infection (Anesthesia/Analgesia, Neuraxial)  Goal: Absence of Infection Signs and Symptoms  Outcome: Ongoing, Progressing  Goal: Absence of Infection Signs and Symptoms  Outcome: Ongoing, Progressing     Problem: Nausea and Vomiting (Anesthesia/Analgesia, Neuraxial)  Goal: Nausea and Vomiting Relief  Outcome: Ongoing, Progressing  Goal: Nausea and Vomiting Relief  Outcome: Ongoing, Progressing     Problem: Pain (Anesthesia/Analgesia,  Neuraxial)  Goal: Effective Pain Control  Outcome: Ongoing, Progressing  Intervention: Prevent or Manage Pain  Recent Flowsheet Documentation  Taken 8/21/2022 0928 by Ellen Meyers RN  Pain Management Interventions:   see MAR   quiet environment facilitated   pain management plan reviewed with patient/caregiver  Goal: Effective Pain Control  Outcome: Ongoing, Progressing  Intervention: Prevent or Manage Pain  Recent Flowsheet Documentation  Taken 8/21/2022 0928 by Ellen Meyers RN  Pain Management Interventions:   see MAR   quiet environment facilitated   pain management plan reviewed with patient/caregiver     Problem: Respiratory Compromise (Anesthesia/Analgesia, Neuraxial)  Goal: Effective Oxygenation and Ventilation  Outcome: Ongoing, Progressing  Goal: Effective Oxygenation and Ventilation  Outcome: Ongoing, Progressing     Problem: Sensorimotor Impairment (Anesthesia/Analgesia, Neuraxial)  Goal: Baseline Motor Function  Outcome: Ongoing, Progressing  Intervention: Optimize Sensorimotor Function  Recent Flowsheet Documentation  Taken 8/21/2022 1450 by Ellen Meyers RN  Safety Promotion/Fall Prevention: safety round/check completed  Taken 8/21/2022 1225 by Ellen Meyers RN  Safety Promotion/Fall Prevention: safety round/check completed  Taken 8/21/2022 1049 by Ellen Meyers RN  Safety Promotion/Fall Prevention: safety round/check completed  Taken 8/21/2022 0928 by Ellen Meyers RN  Safety Promotion/Fall Prevention: safety round/check completed  Goal: Baseline Motor Function  Outcome: Ongoing, Progressing  Intervention: Optimize Sensorimotor Function  Recent Flowsheet Documentation  Taken 8/21/2022 1450 by Ellen Meyers RN  Safety Promotion/Fall Prevention: safety round/check completed  Taken 8/21/2022 1225 by Ellen Meyers RN  Safety Promotion/Fall Prevention: safety round/check completed  Taken 8/21/2022 1049 by Ellen Meyers RN  Safety Promotion/Fall Prevention:  safety round/check completed  Taken 8/21/2022 0928 by Ellen Meyers, RN  Safety Promotion/Fall Prevention: safety round/check completed     Problem: Urinary Retention (Anesthesia/Analgesia, Neuraxial)  Goal: Effective Urinary Elimination  Outcome: Ongoing, Progressing  Goal: Effective Urinary Elimination  Outcome: Ongoing, Progressing     Problem: Skin Injury Risk Increased  Goal: Skin Health and Integrity  Outcome: Ongoing, Progressing   Goal Outcome Evaluation:  Plan of Care Reviewed With: patient, spouse        Progress: improving  Outcome Evaluation: pt vss, pain well controlled with motrin. breastfeeding well.

## 2022-08-21 NOTE — ANESTHESIA POSTPROCEDURE EVALUATION
Patient: Viry Theodore    Procedure Summary     Date: 08/20/22 Room / Location:     Anesthesia Start: 1402 Anesthesia Stop: 2350    Procedure: LABOR ANALGESIA Diagnosis:     Scheduled Providers:  Provider: Steve Haro MD    Anesthesia Type: epidural ASA Status: Not recorded          Anesthesia Type: epidural    Vitals  Vitals Value Taken Time   /71 08/21/22 0405   Temp 36.8 °C (98.2 °F) 08/21/22 0405   Pulse 87 08/21/22 0405   Resp 16 08/21/22 0405   SpO2 100 % 08/20/22 2350           Anesthesia Post Evaluation

## 2022-08-21 NOTE — PLAN OF CARE
Problem: Adult Inpatient Plan of Care  Goal: Plan of Care Review  Outcome: Ongoing, Progressing  Flowsheets (Taken 8/21/2022 0156)  Plan of Care Reviewed With: patient  Outcome Evaluation: Spontaneous vaginal delivery at 2350.  Pt was comfortable with epdiural.  Recovery wnl, mom jocy for one hour and was able to BF.  Anticipate transition to mother baby with no problems.  Goal: Patient-Specific Goal (Individualized)  Outcome: Ongoing, Progressing  Flowsheets (Taken 8/21/2022 0156)  Patient-Specific Goals (Include Timeframe): healthy mom and baby by discharge  Individualized Care Needs: jocy and breastfeeding  Anxieties, Fears or Concerns: first baby, pain control  Goal: Absence of Hospital-Acquired Illness or Injury  Outcome: Ongoing, Progressing  Intervention: Identify and Manage Fall Risk  Recent Flowsheet Documentation  Taken 8/20/2022 1915 by Patt Lopez RN  Safety Promotion/Fall Prevention:   activity supervised   clutter free environment maintained   fall prevention program maintained   room organization consistent   safety round/check completed  Goal: Optimal Comfort and Wellbeing  Outcome: Ongoing, Progressing  Intervention: Provide Person-Centered Care  Recent Flowsheet Documentation  Taken 8/21/2022 0015 by Patt Lopez, RN  Trust Relationship/Rapport:   care explained   choices provided   emotional support provided   empathic listening provided   questions answered   questions encouraged   reassurance provided   thoughts/feelings acknowledged  Taken 8/20/2022 1915 by Patt Lopez RN  Trust Relationship/Rapport:   care explained   choices provided   emotional support provided   empathic listening provided   questions answered   questions encouraged   reassurance provided   thoughts/feelings acknowledged  Goal: Readiness for Transition of Care  Outcome: Ongoing, Progressing     Problem: Bleeding (Labor)  Goal: Hemostasis  Outcome: Ongoing, Progressing     Problem: Change in Fetal Wellbeing  (Labor)  Goal: Stable Fetal Wellbeing  Outcome: Ongoing, Progressing     Problem: Delayed Labor Progression (Labor)  Goal: Effective Progression to Delivery  Outcome: Ongoing, Progressing     Problem: Infection (Labor)  Goal: Absence of Infection Signs and Symptoms  Outcome: Ongoing, Progressing     Problem: Labor Pain (Labor)  Goal: Acceptable Pain Control  Outcome: Ongoing, Progressing     Problem: Uterine Tachysystole (Labor)  Goal: Normal Uterine Contraction Pattern  Outcome: Ongoing, Progressing     Problem:  Fall Injury Risk  Goal: Absence of Fall, Infant Drop and Related Injury  Outcome: Ongoing, Progressing  Intervention: Promote Injury-Free Environment  Recent Flowsheet Documentation  Taken 2022 by Patt Lopez, RN  Safety Promotion/Fall Prevention:   activity supervised   clutter free environment maintained   fall prevention program maintained   room organization consistent   safety round/check completed     Problem: Device-Related Complication Risk (Anesthesia/Analgesia, Neuraxial)  Goal: Safe Infusion Delivery Completion  Outcome: Ongoing, Progressing  Goal: Safe Infusion Delivery Completion  Outcome: Ongoing, Progressing     Problem: Infection (Anesthesia/Analgesia, Neuraxial)  Goal: Absence of Infection Signs and Symptoms  Outcome: Ongoing, Progressing  Goal: Absence of Infection Signs and Symptoms  Outcome: Ongoing, Progressing     Problem: Nausea and Vomiting (Anesthesia/Analgesia, Neuraxial)  Goal: Nausea and Vomiting Relief  Outcome: Ongoing, Progressing  Goal: Nausea and Vomiting Relief  Outcome: Ongoing, Progressing     Problem: Pain (Anesthesia/Analgesia, Neuraxial)  Goal: Effective Pain Control  Outcome: Ongoing, Progressing  Goal: Effective Pain Control  Outcome: Ongoing, Progressing     Problem: Respiratory Compromise (Anesthesia/Analgesia, Neuraxial)  Goal: Effective Oxygenation and Ventilation  Outcome: Ongoing, Progressing  Goal: Effective Oxygenation and  Ventilation  Outcome: Ongoing, Progressing     Problem: Sensorimotor Impairment (Anesthesia/Analgesia, Neuraxial)  Goal: Baseline Motor Function  Outcome: Ongoing, Progressing  Intervention: Optimize Sensorimotor Function  Recent Flowsheet Documentation  Taken 8/20/2022 1915 by Patt Lopez, RN  Safety Promotion/Fall Prevention:   activity supervised   clutter free environment maintained   fall prevention program maintained   room organization consistent   safety round/check completed  Goal: Baseline Motor Function  Outcome: Ongoing, Progressing  Intervention: Optimize Sensorimotor Function  Recent Flowsheet Documentation  Taken 8/20/2022 1915 by Patt Lopez, RN  Safety Promotion/Fall Prevention:   activity supervised   clutter free environment maintained   fall prevention program maintained   room organization consistent   safety round/check completed     Problem: Urinary Retention (Anesthesia/Analgesia, Neuraxial)  Goal: Effective Urinary Elimination  Outcome: Ongoing, Progressing  Goal: Effective Urinary Elimination  Outcome: Ongoing, Progressing     Problem: Skin Injury Risk Increased  Goal: Skin Health and Integrity  Outcome: Ongoing, Progressing   Goal Outcome Evaluation:  Plan of Care Reviewed With: patient           Outcome Evaluation: Spontaneous vaginal delivery at 2350.  Pt was comfortable with epdiural.  Recovery wnl, mom jocy for one hour and was able to BF.  Anticipate transition to mother baby with no problems.

## 2022-08-21 NOTE — L&D DELIVERY NOTE
Vaginal delivery note    2022    Patient:Viry Theodore    MR#:[id number]    27 y.o. yo female  at 39w1d     Patient Active Problem List   Diagnosis   • Supervision of normal first pregnancy, antepartum   • Abnormal glucose tolerance affecting pregnancy, antepartum   • Supervision of low-risk pregnancy, third trimester   •  (normal spontaneous vaginal delivery)       Delivery     Delivery: Vaginal, Spontaneous     YOB: 2020    Time of Birth: 11:50 PM      Anesthesia: Epidural     Delivering clinician: Saritha Nelson    Forceps?   No   Vacuum? No    Shoulder dystocia present: No        The patient is admitted for elective induction of labor at term. She received cervidil for cervical ripening. It was removed and a cook catheter was placed and pitocin started.  The patient entered an active phase of labor and progressed along a normal labor curve to complete dilatation at +2 station.    Pushing for about fifteen minutes, the patient delivered a live viable female infant in right occiput anterior position. The anterior and posterior shoulder delivered without difficulty.  No nuchal cord was identified. The body was delivered atraumatically.  The infant's nose and oropharynx were suctioned and cleared of secretions.  Cord clamping was delayed a minimum of 30 seconds then was clamped and cut.  The infant was placed on the mom's chest for bonding and care.    The placenta was expressed and delivered intact. It appeared normal. A uterine sweep revealed no retained products. Her bladder was drained of approximately 100 mL urine.    A posterior midline second degree was repaired with 2-0 vicryl and a right labial was repaired with 3-0 vicryl.     Quantitative blood loss: Quantitative Blood Loss (mL): 262 mL    Summary:  Uncomplicated Vaginal delivery      Infant    Findings: female  infant     Infant observations: Weight: No birth weight on file.     Observations/Comments:  Joao  3       Apgars: 8  @ 1 minute /    8  @ 5 minutes   Infant Name: Nata Jones     Placenta, Cord, and Fluid    Placenta delivered  Spontaneous  at  8/20/2022 11:55 PM     Cord: 3 vessels  present.   Nuchal Cord?  no   Cord blood obtained: Yes    Cord gases obtained:  Yes    Cord gas results: pH, Cord Venous   Date Value Ref Range Status   08/21/2022 7.313 7.260 - 7.400 pH Units Final            Repair    Episiotomy: No   Lacerations: Yes  Laceration Information  Laceration Repaired?   Perineal: 2nd      Periurethral:       Labial:       Sulcus:       Vaginal:       Cervical:          Suture used for repair: 2-0 Vicryl and 3-0 Vicryl     Complications  none    Disposition  Mother to Mother Baby/Postpartum  in stable condition currently.  Baby to remains with mom  in stable condition currently.    [x]  Labs reviewed:  Bld A+   Rubella immune  Varicella immune  Tdap received      Saritha Nelson MD  08/21/22  00:21 EDT

## 2022-08-21 NOTE — PROGRESS NOTES
"[facility]  Vaginal Delivery Progress Note    Subjective   Postpartum Day 1/2: Vaginal Delivery 2350 last pm    The patient feels well.  Her pain is well controlled.   She is ambulating well.  Patient describes her bleeding as staining only.    Breastfeeding: infant latching.    ROS:  Pulm: neg for shortness of air  : neg for heavy bleeding  Musculoskeletal: neg for leg pain    Objective     Vital Signs Range for the last 24 hours  Temperature: Temp:  [97.5 °F (36.4 °C)-99.2 °F (37.3 °C)] 98 °F (36.7 °C)   Temp Source: Temp src: Oral   BP: BP: ()/(43-85) 115/73   Pulse: Heart Rate:  [] 97   Respirations: Resp:  [14-18] 17   SPO2: SpO2:  [96 %-100 %] 100 %   O2 Amount (l/min):     O2 Devices     Weight:       Admit Height:  Height: 162.6 cm (64\")      Physical Exam:  General:  no acute distress.  Abdomen: Fundus: appropriate, firm, non tender  Extremities: normal, atraumatic, no cyanosis, and trace edema.       Lab results reviewed:  Postpartum CBC ordered for tomorrow am  Rubella:   prenatal record --    Rubella Antibodies, IgG   Date Value Ref Range Status   2021 8.23 Immune >0.99 index Final     Comment:                                     Non-immune       <0.90                                  Equivocal  0.90 - 0.99                                  Immune           >0.99       Rh Status:    RH type   Date Value Ref Range Status   2022 Positive  Final     Rh Factor   Date Value Ref Range Status   2021 Positive  Final     Comment:     Please note: Prior records for this patient's ABO / Rh type are not  available for additional verification.       Immunizations:   Immunization History   Administered Date(s) Administered   • COVID-19 (PFIZER) PURPLE CAP 2020, 2021       Assessment & Plan       Supervision of normal first pregnancy, antepartum    Supervision of low-risk pregnancy, third trimester     (normal spontaneous vaginal delivery)      Viry Theodore is " Day 1/2  post-partum  Vaginal, Spontaneous  : pt is doing well and denies any issues    Plan:  Continue current care.      Radha Peraza MD  8/21/2022  10:06 EDT

## 2022-08-21 NOTE — LACTATION NOTE
P1T. Patient said baby has been nursing well so far. Has personal breast pump . Lots of new parents questions. Enc to call LC for observation of latch. Knows to offer breast q 2-3 hours or anytime baby shows hunger cues.

## 2022-08-22 VITALS
WEIGHT: 189.2 LBS | BODY MASS INDEX: 32.3 KG/M2 | TEMPERATURE: 97.9 F | DIASTOLIC BLOOD PRESSURE: 85 MMHG | HEIGHT: 64 IN | SYSTOLIC BLOOD PRESSURE: 133 MMHG | OXYGEN SATURATION: 100 % | RESPIRATION RATE: 18 BRPM | HEART RATE: 78 BPM

## 2022-08-22 LAB
BASOPHILS # BLD AUTO: 0.06 10*3/MM3 (ref 0–0.2)
BASOPHILS NFR BLD AUTO: 0.6 % (ref 0–1.5)
DEPRECATED RDW RBC AUTO: 44.8 FL (ref 37–54)
EOSINOPHIL # BLD AUTO: 0.13 10*3/MM3 (ref 0–0.4)
EOSINOPHIL NFR BLD AUTO: 1.2 % (ref 0.3–6.2)
ERYTHROCYTE [DISTWIDTH] IN BLOOD BY AUTOMATED COUNT: 12.1 % (ref 12.3–15.4)
HCT VFR BLD AUTO: 37.3 % (ref 34–46.6)
HGB BLD-MCNC: 12.6 G/DL (ref 12–15.9)
IMM GRANULOCYTES # BLD AUTO: 0.03 10*3/MM3 (ref 0–0.05)
IMM GRANULOCYTES NFR BLD AUTO: 0.3 % (ref 0–0.5)
LYMPHOCYTES # BLD AUTO: 1.93 10*3/MM3 (ref 0.7–3.1)
LYMPHOCYTES NFR BLD AUTO: 17.8 % (ref 19.6–45.3)
MCH RBC QN AUTO: 33.7 PG (ref 26.6–33)
MCHC RBC AUTO-ENTMCNC: 33.8 G/DL (ref 31.5–35.7)
MCV RBC AUTO: 99.7 FL (ref 79–97)
MONOCYTES # BLD AUTO: 0.99 10*3/MM3 (ref 0.1–0.9)
MONOCYTES NFR BLD AUTO: 9.1 % (ref 5–12)
NEUTROPHILS NFR BLD AUTO: 7.69 10*3/MM3 (ref 1.7–7)
NEUTROPHILS NFR BLD AUTO: 71 % (ref 42.7–76)
NRBC BLD AUTO-RTO: 0 /100 WBC (ref 0–0.2)
PLATELET # BLD AUTO: 209 10*3/MM3 (ref 140–450)
PMV BLD AUTO: 10.8 FL (ref 6–12)
RBC # BLD AUTO: 3.74 10*6/MM3 (ref 3.77–5.28)
WBC NRBC COR # BLD: 10.83 10*3/MM3 (ref 3.4–10.8)

## 2022-08-22 PROCEDURE — 85025 COMPLETE CBC W/AUTO DIFF WBC: CPT | Performed by: OBSTETRICS & GYNECOLOGY

## 2022-08-22 RX ORDER — IBUPROFEN 800 MG/1
800 TABLET ORAL EVERY 8 HOURS PRN
Qty: 60 TABLET | Refills: 1 | Status: SHIPPED | OUTPATIENT
Start: 2022-08-22 | End: 2022-10-10

## 2022-08-22 RX ADMIN — IBUPROFEN 800 MG: 800 TABLET, FILM COATED ORAL at 08:46

## 2022-08-22 RX ADMIN — DOCUSATE SODIUM 100 MG: 100 CAPSULE, LIQUID FILLED ORAL at 08:45

## 2022-08-22 RX ADMIN — Medication 1 TABLET: at 08:45

## 2022-08-22 NOTE — PAYOR COMM NOTE
"Viry Kirby (27 y.o. Female)       [facility]    [address] [place] [address]  Facility NPI: [id number]    Richar Avelar  Fax: [phone]  Phone: [phone] (Lian: [phone])    Subject: MATERNITY ADMISSION AUTH REQUEST CLINICALS   Reference #: [id number]  Please don't hesitate to contact me with any questions or concerns.              Date of Birth   1995    Social Security Number       Address   [address] [address]    Home Phone   [phone]    MRN   [id number]       Sabianism   None    Marital Status                               Admission Date   8/20/22    Admission Type   Elective    Admitting Provider   Saritha Nelson MD    Attending Provider       Department, Room/Bed   [facility], S319/1       Discharge Date   8/22/2022    Discharge Disposition   Home or Self Care    Discharge Destination                               Attending Provider: (none)   Allergies: No Known Allergies    Isolation: None   Infection: None   Code Status: Prior   Advance Care Planning Activity    Ht: 162.6 cm (64\")   Wt: 85.8 kg (189 lb 3.2 oz)    Admission Cmt: None   Principal Problem: None                Active Insurance as of 8/20/2022     Primary Coverage     Payor Plan Insurance Group Employer/Plan Group    [facility] [id number]     Payor Plan Address Payor Plan Phone Number Payor Plan Fax Number Effective Dates    [address]   1/1/2018 - None Entered    [facility] [address]       Subscriber Name Subscriber Birth Date Member ID       JOHN KIRBY ALEJO 3/9/1976 [id number]                 Emergency Contacts      (Rel.) Home Phone Work Phone Mobile Phone    John Kirby (Mother) -- -- [phone]               History & Physical      Saritha Nelson MD at 08/20/22 [address] [place] [address]  Obstetric History and Physical    Chief Complaint   Patient presents with   • Scheduled " Induction     +FM. Denies LOF or VB       Subjective     Patient is a 27 y.o. female  currently at 39w0d, who presents for elective induction at term.    Her prenatal care is complicated by abnl 1 hr GTT, normal 3 hr.  Her previous obstetric/gynecological history is non-contributory.    The following portions of the patients history were reviewed and updated as appropriate: current medications, allergies, past medical history, past surgical history, problem list and OB hx .       Prenatal Information:       Prenatal Labs for [facility] Patients  Lab Results   Component Value Date    HGB 12.7 2022    HEPBSAG Negative 2021    ABSCRN Negative 2022    RZA1JFF0 Non Reactive 2021    HEPCVIRUSABY <0.1 2021       Prenatal Labs -- transcribed from paper records by Nurse  Lab Results   Component Value Date    ABO A 2022    RH Positive 2022    HEPBSAG Negative 2021    RPR Non Reactive 2021    [id number] Non Reactive 2021           Past OB History:       OB History    Para Term  AB Living   2 0 0 0 1 0   SAB IAB Ectopic Molar Multiple Live Births   0 0 0 0 0 0      # Outcome Date GA Lbr William/2nd Weight Sex Delivery Anes PTL Lv   2 Current            1 AB      TAB          Past Medical History: Past Medical History:   Diagnosis Date   • Dysmenorrhea    • Migraine    • Raynaud's syndrome    • UTI (urinary tract infection)       Past Surgical History Past Surgical History:   Procedure Laterality Date   • KNEE ACL RECONSTRUCTION     • WISDOM TOOTH EXTRACTION        Family History: Family History   Problem Relation Age of Onset   • Breast cancer Mother    • Endometriosis Maternal Grandmother    • Melanoma Maternal Grandmother    • Ovarian cancer Neg Hx    • Uterine cancer Neg Hx    • Colon cancer Neg Hx    • Pancreatic cancer Neg Hx    • Congenital heart disease Neg Hx    • Cystic fibrosis Neg Hx       Social History:  reports that  she has never smoked. She has never used smokeless tobacco.   reports previous alcohol use of about 3.0 standard drinks of alcohol per week.   reports no history of drug use.        General ROS: Pertinent items are noted in HPI, all other systems reviewed and negative    Objective       Vital Signs Range for the last 24 hours  Temperature: Temp:  [98.2 °F (36.8 °C)] 98.2 °F (36.8 °C)   Temp Source: Temp src: Oral   BP: BP: (102-115)/(63-83) 113/83   Pulse: Heart Rate:  [] 96   Respirations: Resp:  [16-18] 16   SPO2: SpO2:  [99 %] 99 %   O2 Amount (l/min):     O2 Devices     Weight: Weight:  [85.8 kg (189 lb 3.2 oz)] 85.8 kg (189 lb 3.2 oz)     Physical Examination: General appearance - alert, well appearing, and in no distress  Chest - no tachypnea, retractions or cyanosis  Abdomen - fundus soft, nontender.  Pelvic -   Adequate pelvimetry  Extremities - no pedal edema noted, no calf TTP  Skin - normal coloration and turgor, no rashes, no suspicious skin lesions noted    Presentation: Vertex by suture palpation   Cervix: Exam by: Method: sterile exam per physician   Dilation: Cervical Dilation (cm): 1   Effacement: Cervical Effacement: 50%   Station: Fetal Station: -2       Fetal Heart Rate Assessment   Method: Fetal HR Assessment Method: external   Beats/min: Fetal HR (beats/min): 135   Baseline: Fetal HR Baseline: normal range   Varibility: Fetal HR Variability: moderate (amplitude range 6 to 25 bpm)   Accels: Fetal HR Accelerations: greater than/equal to 15 bpm, lasting at least 15 seconds   Decels: Fetal HR Decelerations: absent   Tracing Category:       Uterine Assessment   Method: Method: palpation, external tocotransducer   Frequency (min): Contraction Frequency (Minutes): 2-4   Ctx Count in 10 min:     Duration:     Intensity: Contraction Intensity: mild by palpation   Intensity by IUPC:     Resting Tone: Uterine Resting Tone: soft by palpation   Resting Tone by IUPC:     [place] Units:       Results  from last 7 days   Lab Units 22  0123   WBC 10*3/mm3 9.55   HEMOGLOBIN g/dL 12.7   HEMATOCRIT % 37.9   PLATELETS 10*3/mm3 216       Assessment & Plan       Supervision of normal first pregnancy, antepartum    Supervision of low-risk pregnancy, third trimester        Assessment:  1.  Intrauterine pregnancy at 39w0d weeks gestation with reactive fetal status.    2.  Term elective induction  3.  Obstetrical history benign  4.  GBS status: negative      Plan:  1. S/p cervidil, cook catheter placed just now. Plan to start pitocin  2. Plan of care has been reviewed with patient and her   3.  Risks, benefits of treatment plan have been discussed.  4.  All questions have been answered.      Saritha Nelson MD  2022  11:42 EDT        Electronically signed by Saritha Nelson MD at 22 1143         Facility-Administered Medications as of 2022   Medication Dose Route Frequency Provider Last Rate Last Admin   • [COMPLETED] dinoprostone (CERVIDIL) vaginal insert 10 mg  10 mg Vaginal Once Saritha Nelson MD   10 mg at 22 0215   • [] erythromycin (ROMYCIN) 5 MG/GM ophthalmic ointment  - ADS Override Pull            • [COMPLETED] lactated ringers bolus 1,000 mL  1,000 mL Intravenous Once PRN Saritha Nelson MD   Stopped at 22 1558   • [COMPLETED] mineral oil liquid 30 mL  30 mL Topical Once Saritha Nelson MD   30 mL at 22 2337   • [COMPLETED] oxytocin (PITOCIN) 30 units in 0.9% sodium chloride 500 mL (premix)  125 mL/hr Intravenous Continuous PRN Saritha Nelson  mL/hr at 22 0106 125 mL/hr at 22 0106   • [] phytonadione (VITAMIN K) 1 MG/0.5ML injection  - ADS Override Pull              Lab Results (last 7 days)     Procedure Component Value Units Date/Time    CBC & Differential [[id number]]  (Abnormal) Collected: 22    Specimen: Blood Updated: 22    Narrative:      The following orders were created for panel order CBC &  Differential.  Procedure                               Abnormality         Status                     ---------                               -----------         ------                     CBC Auto Differential[[id number]]        Abnormal            Final result                 Please view results for these tests on the individual orders.    CBC Auto Differential [[id number]]  (Abnormal) Collected: 08/22/22 0540    Specimen: Blood Updated: 08/22/22 0610     WBC 10.83 10*3/mm3      RBC 3.74 10*6/mm3      Hemoglobin 12.6 g/dL      Hematocrit 37.3 %      MCV 99.7 fL      MCH 33.7 pg      MCHC 33.8 g/dL      RDW 12.1 %      RDW-SD 44.8 fl      MPV 10.8 fL      Platelets 209 10*3/mm3      Neutrophil % 71.0 %      Lymphocyte % 17.8 %      Monocyte % 9.1 %      Eosinophil % 1.2 %      Basophil % 0.6 %      Immature Grans % 0.3 %      Neutrophils, Absolute 7.69 10*3/mm3      Lymphocytes, Absolute 1.93 10*3/mm3      Monocytes, Absolute 0.99 10*3/mm3      Eosinophils, Absolute 0.13 10*3/mm3      Basophils, Absolute 0.06 10*3/mm3      Immature Grans, Absolute 0.03 10*3/mm3      nRBC 0.0 /100 WBC     Blood Gas, Venous, Cord [[id number]]  (Abnormal) Collected: 08/21/22 0014    Specimen: Cord Blood Venous from Umbilical Cord Updated: 08/21/22 0016     Site N/A     Comment: N/A        pH, Cord Venous 7.313 pH Units      pCO2, Cord Venous 37.7 mm Hg      pO2, Cord Venous 39.2 mm Hg      HCO3, Cord Venous 19.1 mmol/L      Base Excess, Cord Venous -6.5 mmol/L      O2 Sat, Cord Venous --     Comment: Direct O2 saturation result not reported at this site.        Barometric Pressure for Blood Gas 753.2 mmHg      Comment: SAMPLE DRAWN BY [id number] ON RA @ 2253 Meter: [id number] : [id number] Reade        Modality Room Air     FIO2 21 %      Ventilator Mode NA     O2 Saturation Calculated 69.3 %      Note --     Collection Time --    COVID PRE-OP / PRE-PROCEDURE SCREENING ORDER (NO ISOLATION) - Swab, Nasopharynx [[id number]]  (Normal)  Collected: 08/20/22 0102    Specimen: Swab from Nasopharynx Updated: 08/20/22 0208    Narrative:      The following orders were created for panel order COVID PRE-OP / PRE-PROCEDURE SCREENING ORDER (NO ISOLATION) - Swab, Nasopharynx.  Procedure                               Abnormality         Status                     ---------                               -----------         ------                     COVID-19,[facility] IN-HOUSE...[[id number]]  Normal              Final result                 Please view results for these tests on the individual orders.    COVID-19,[facility] IN-HOUSE CEPHEID/ALICJA NP SWAB IN TRANSPORT MEDIA 8-12 HR TAT - Swab, Nasopharynx [[id number]]  (Normal) Collected: 08/20/22 0102    Specimen: Swab from Nasopharynx Updated: 08/20/22 0208     COVID19 Not Detected    Narrative:      Fact sheet for providers: https://www.fda.gov/media/762984/download     Fact sheet for patients: https://www.fda.gov/media/490497/download    CBC (No Diff) [[id number]]  (Abnormal) Collected: 08/20/22 0123    Specimen: Blood Updated: 08/20/22 0135     WBC 9.55 10*3/mm3      RBC 3.81 10*6/mm3      Hemoglobin 12.7 g/dL      Hematocrit 37.9 %      MCV 99.5 fL      MCH 33.3 pg      MCHC 33.5 g/dL      RDW 12.4 %      RDW-SD 45.1 fl      MPV 10.7 fL      Platelets 216 10*3/mm3           Imaging Results (Last 7 Days)     ** No results found for the last 168 hours. **        ECG/EMG Results (last 7 days)     ** No results found for the last 168 hours. **        Orders (last 72 hrs)      Start     Ordered    08/22/22 0858  Discontinue IV  Once,   Status:  Canceled         08/22/22 0857 08/22/22 0856  Discharge patient  Once         08/22/22 0857 08/22/22 0800  Sitz Bath  3 Times Daily,   Status:  Canceled        Comments: PRN    08/21/22 0255    08/22/22 0600  CBC & Differential  Morning Draw        Comments: Postpartum Day 1      08/21/22 0255    08/22/22 0600  CBC Auto Differential  PROCEDURE ONCE        Comments: Postpartum  "Day 1      08/21/22 2203 08/22/22 0000  bisacodyl (DULCOLAX) suppository 10 mg  Daily PRN,   Status:  Discontinued         08/21/22 0255    08/22/22 0000  ibuprofen (ADVIL,MOTRIN) 800 MG tablet  Every 8 Hours PRN         08/22/22 0857    08/22/22 0000  Pelvic Rest         08/22/22 0857    08/22/22 0000  Discharge Follow-up with Specified Provider: Leslie; 2 Weeks         08/22/22 0857    08/22/22 0000  Call MD for problems / concerns.        Comments: Heavy bleeding:  Greater than a pad an hour for more than 2 hours  Fever greater than 101.3   Redness of the episiotomy or vaginal laceration and/or severe pain increased from discharge pain.      08/22/22 0857    08/21/22 0900  docusate sodium (COLACE) capsule 100 mg  2 Times Daily,   Status:  Discontinued         08/21/22 0255 08/21/22 0900  prenatal vitamin tablet 1 tablet  Daily,   Status:  Discontinued         08/21/22 0255 08/21/22 0834  Transfer to postpartum when discharge criteria met.  Until Discontinued,   Status:  Canceled         08/21/22 0833 08/21/22 0834  Inpatient Admission  Once         08/21/22 0833    08/21/22 0613  oxytocin (PITOCIN) 30 units in 0.9% sodium chloride 500 mL (premix)  Continuous PRN,   Status:  Discontinued        \"Followed by\" Linked Group Details    08/20/22 2312 08/21/22 0600  oxytocin (PITOCIN) 30 units in 0.9% sodium chloride 500 mL (premix)  Once,   Status:  Discontinued        \"Followed by\" Linked Group Details    08/20/22 2312 08/21/22 0256  Code Status and Medical Interventions:  Continuous,   Status:  Canceled         08/21/22 0255 08/21/22 0256  Vital Signs Per Hospital Policy  Per Hospital Policy,   Status:  Canceled         08/21/22 0255 08/21/22 0256  Notify Physician  Until Discontinued,   Status:  Canceled         08/21/22 0255    08/21/22 0256  Up Ad Sharri  Until Discontinued,   Status:  Canceled         08/21/22 0255    08/21/22 0256  Ambulate Patient  Every Shift,   Status:  Canceled       " 08/21/22 0255    08/21/22 0256  Patient May Shower  Once,   Status:  Canceled         08/21/22 0255 08/21/22 0256  Advance Diet as Tolerated  Until Discontinued,   Status:  Canceled         08/21/22 0255 08/21/22 0256  RN to Assess Rh Status & Place RhIG Evaluation Order if Indicated  Continuous,   Status:  Canceled         08/21/22 0255 08/21/22 0256  Bladder Assessment  Per Order Details,   Status:  Canceled        Comments: Postpartum 1) Upon Admission to Unit & Every 4 Hours PRN Until Voiding. 2) Out of Bed to Void in 8 Hours.    08/21/22 0255 08/21/22 0256  Straight Cath  Per Order Details,   Status:  Canceled        Comments: Postpartum: If Distended & Unable to Void, May Repeat Once.    08/21/22 0255 08/21/22 0256  Indwelling Urinary Catheter  Per Order Details,   Status:  Canceled        Comments: Postpartum : After Straight Cathed x2 or if Greater Than 1000mL Residual, Insert Indwelling Urinary Catheter Until Further MD Order.    08/21/22 0255 08/21/22 0256  Remove Vaginal Packing  Once,   Status:  Canceled         08/21/22 0255 08/21/22 0256  Breast pump to bed  Once,   Status:  Canceled         08/21/22 0255 08/21/22 0256  If indicated -- Please administer RH Immunoglobulin based on results of cord blood evaluation and fetal screen lab tests, pharmacy to dispense  Continuous,   Status:  Canceled        Comments: See process instructions for reference range details.    08/21/22 0255 08/21/22 0255  Waffle Cushion  As Needed,   Status:  Canceled      Comments: For perineal discomfort    08/21/22 0255 08/21/22 0255  Donut Ring  As Needed,   Status:  Canceled      Comments: For perineal pain    08/21/22 0255 08/21/22 0255  Kpad  As Needed,   Status:  Canceled      Comments: For pain    08/21/22 0255 08/21/22 0255  Warm compress  As Needed,   Status:  Canceled       08/21/22 0255 08/21/22 0255  Apply ace wrap, tight bra, or binder  As Needed,   Status:  Canceled        08/21/22 0255 08/21/22 0255  Apply ice packs  As Needed,   Status:  Canceled       08/21/22 0255 08/21/22 0255  sodium chloride 0.9 % flush 1-10 mL  As Needed,   Status:  Discontinued         08/21/22 0255 08/21/22 0255  diphenhydrAMINE (BENADRYL) capsule 25 mg  Nightly PRN,   Status:  Discontinued         08/21/22 0255 08/21/22 0255  magnesium hydroxide (MILK OF MAGNESIA) suspension 10 mL  Daily PRN,   Status:  Discontinued         08/21/22 0255 08/21/22 0255  all purpose nipple ointment  4 Times Daily PRN,   Status:  Discontinued         08/21/22 0255 08/21/22 0255  benzocaine-menthol (DERMOPLAST) 20-0.5 % topical spray  As Needed,   Status:  Discontinued         08/21/22 0255 08/21/22 0255  Hydrocort-Pramoxine (Perianal) (PROCTOFOAM-HS) 1-1 % rectal foam 1 application  As Needed,   Status:  Discontinued         08/21/22 0255 08/21/22 0255  benzocaine (AMERICAINE) 20 % rectal ointment 1 application  As Needed,   Status:  Discontinued         08/21/22 0255 08/21/22 0255  ondansetron (ZOFRAN) tablet 4 mg  Every 8 Hours PRN,   Status:  Discontinued         08/21/22 0255 08/21/22 0255  calcium carbonate (TUMS) chewable tablet 500 mg (200 mg elemental)  3 Times Daily PRN,   Status:  Discontinued         08/21/22 0255 08/21/22 0053  Fundal and Lochia Check  Per Hospital Policy,   Status:  Canceled        Comments: Q 15 min x 4, Q 30 min x 2, then Q Shift    08/21/22 0052    08/21/22 0053  Vital Signs Per Hospital Policy  Per Hospital Policy,   Status:  Canceled         08/21/22 0052 08/21/22 0053  Fundal & Lochia Check  Per Order Details,   Status:  Canceled        Comments: Every 15 Minutes x4, Then Every 30 Minutes x2, Then Every Shift    08/21/22 0052    08/21/22 0053  Notify Physician (specified)  Until Discontinued,   Status:  Canceled         08/21/22 0052 08/21/22 0053  Diet Regular  Diet Effective Now,   Status:  Canceled         08/21/22 0052 08/21/22 0053  Blood Gas,  Arterial, Cord  Once         08/21/22 0052    08/21/22 0053  Blood Gas, Venous, Cord  Once         08/21/22 0052    08/21/22 0052  Up with Assistance  As Needed,   Status:  Canceled       08/21/22 0052    08/21/22 0052  Fundal & Lochia Check  Every Shift,   Status:  Canceled       08/21/22 0052    08/21/22 0052  Apply Ice to Perineum  As Needed,   Status:  Canceled       08/21/22 0052    08/21/22 0052  Bladder Assessment  As Needed,   Status:  Canceled       08/21/22 0052    08/21/22 0052  Apply Ice to Perineum  As Needed,   Status:  Canceled      Comments: For 20 min q 2 hrs    08/21/22 0052 08/21/22 0052  Apply witch hazel pads / TUCKS to perineum as needed for comfort PRN  As Needed,   Status:  Canceled       08/21/22 0052    08/21/22 0052  oxytocin (PITOCIN) 30 units in 0.9% sodium chloride 500 mL (premix)  Continuous PRN         08/21/22 0052 08/21/22 0052  ibuprofen (ADVIL,MOTRIN) tablet 800 mg  Every 8 Hours PRN,   Status:  Discontinued         08/21/22 0052 08/21/22 0052  HYDROcodone-acetaminophen (NORCO) 5-325 MG per tablet 1 tablet  Every 4 Hours PRN,   Status:  Discontinued         08/21/22 0052 08/21/22 0052  Hydrocortisone (Perianal) (ANUSOL-HC) 2.5 % rectal cream 1 application  As Needed,   Status:  Discontinued         08/21/22 0052 08/21/22 0045  phytonadione (VITAMIN K) 1 MG/0.5ML injection  - ADS Override Pull        Note to Pharmacy: Created by cabinet override    08/21/22 0045 08/21/22 0045  erythromycin (ROMYCIN) 5 MG/GM ophthalmic ointment  - ADS Override Pull        Note to Pharmacy: Created by cabinet override    08/21/22 0045 08/21/22 0017  Blood Gas, Venous, Cord  PROCEDURE ONCE         08/21/22 0014    08/21/22 0013  Transfer Patient  Once         08/21/22 0013    08/21/22 0013  VTE Prophylaxis Not Indicated: No Risk Factors (0); </= 3 (Low Risk)  Once         08/21/22 0014    08/20/22 2312  ibuprofen (ADVIL,MOTRIN) tablet 600 mg  Every 6 Hours PRN,   Status:   "Discontinued         08/20/22 2312 08/20/22 2312  ondansetron (ZOFRAN) tablet 4 mg  Every 6 Hours PRN,   Status:  Discontinued        \"Or\" Linked Group Details    08/20/22 2312 08/20/22 2312  ondansetron (ZOFRAN) injection 4 mg  Every 6 Hours PRN,   Status:  Discontinued        \"Or\" Linked Group Details    08/20/22 2312 08/20/22 2312  calcium carbonate (TUMS) chewable tablet 500 mg (200 mg elemental)  3 Times Daily PRN,   Status:  Discontinued         08/20/22 2312 08/20/22 2312  methylergonovine (METHERGINE) injection 200 mcg  Once As Needed,   Status:  Discontinued         08/20/22 2312 08/20/22 2312  carboprost (HEMABATE) injection 250 mcg  As Needed,   Status:  Discontinued         08/20/22 2312 08/20/22 2312  miSOPROStol (CYTOTEC) tablet 800 mcg  As Needed,   Status:  Discontinued         08/20/22 2312 08/20/22 1245  oxytocin (PITOCIN) 30 units in 0.9% sodium chloride 500 mL (premix)  Titrated,   Status:  Discontinued         08/20/22 1147    08/20/22 1147  butorphanol (STADOL) injection 1 mg  Every 3 Hours PRN,   Status:  Discontinued         08/20/22 1147 08/20/22 0815  fentaNYL 2mcg/mL and ropivacaine 0.2% in NS epidural 100mL  Continuous,   Status:  Discontinued         08/20/22 0727 08/20/22 0728  Vital Signs Per Anesthesia Guidelines  Continuous,   Status:  Canceled        Comments: Every 2 Minutes x5, Every 5 Minutes x4, then if Stable Every 15 Minutes    08/20/22 0727 08/20/22 0728  Start IV with #16 or #18 gauge angiocath.  Once,   Status:  Canceled         08/20/22 0727 08/20/22 0728  Cardiac Monitoring  Continuous,   Status:  Canceled         08/20/22 0727 08/20/22 0728  Fetal Heart Rate Monitor  Once,   Status:  Canceled         08/20/22 0727 08/20/22 0728  Nurse or anesthesiologist to remain with patient for 15 minutes following dosing.  Until Discontinued,   Status:  Canceled         08/20/22 0727 08/20/22 0728  Facilitate maternal postion on side and " maintain uterine displacement.  Until Discontinued,   Status:  Canceled         08/20/22 0727 08/20/22 0728  Consult anesthesia services prior to changing epidural infusion/rate.  Until Discontinued,   Status:  Canceled         08/20/22 0727 08/20/22 0728  Nurse may remove epidural catheter after delivery.  Until Discontinued,   Status:  Canceled         08/20/22 0727 08/20/22 0728  Notify physician for the following conditions:  Until Discontinued,   Status:  Canceled         08/20/22 0727 08/20/22 0727  ePHEDrine injection 5 mg  As Needed,   Status:  Discontinued         08/20/22 0727 08/20/22 0727  diphenhydrAMINE (BENADRYL) injection 12.5 mg  Every 8 Hours PRN,   Status:  Discontinued         08/20/22 0727 08/20/22 0727  ondansetron (ZOFRAN) injection 4 mg  Once As Needed,   Status:  Discontinued         08/20/22 0727 08/20/22 0727  famotidine (PEPCID) injection 20 mg  Once As Needed,   Status:  Discontinued         08/20/22 0727 08/20/22 0115  sodium chloride 0.9 % flush 10 mL  Every 12 Hours Scheduled,   Status:  Discontinued         08/20/22 0020 08/20/22 0115  lactated ringers infusion  Continuous,   Status:  Discontinued         08/20/22 0020 08/20/22 0115  dinoprostone (CERVIDIL) vaginal insert 10 mg  Once         08/20/22 0020    08/20/22 0115  mineral oil liquid 30 mL  Once         08/20/22 0020    08/20/22 0022  COVID PRE-OP / PRE-PROCEDURE SCREENING ORDER (NO ISOLATION) - Swab, Nasopharynx  Once         08/20/22 0021    08/20/22 0022  COVID-19, [facility] IN-HOUSE CEPHEID/ALICJA NP SWAB IN TRANSPORT MEDIA 8-12 HR TAT - Swab, Nasopharynx  PROCEDURE ONCE         08/20/22 0021    08/20/22 0021  Admit To Obstetrics Inpatient  Once         08/20/22 0020    08/20/22 0021  Code Status and Medical Interventions:  Continuous,   Status:  Canceled         08/20/22 0020 08/20/22 0021  Obtain informed consent  Once,   Status:  Canceled         08/20/22 0020 08/20/22 0021  Place  Sequential Compression Device  Once,   Status:  Canceled         220    22  Maintain Sequential Compression Device  Continuous,   Status:  Canceled         220    22  Vital Signs Per Hospital Policy  Per Hospital Policy,   Status:  Canceled         22  May Ambulate  Until Discontinued,   Status:  Canceled        Comments: If membrane intact, or head engaged with ruptured BOW or if tracing was normal for 20 minutes.    22  Initiate Group Beta Strep (GBS) Prophylaxis Protocol, If Criteria Met  Continuous,   Status:  Canceled        Comments: NO TREATMENT RECOMMENDED IF: 1)  Maternal GBS status known negative 2)  Scheduled  birth with intact membranes, not in labor.  3 ) Maternal GBS unknown, no risk factors.   TREAT WITH ANTIBIOTICS IF:  1)  Maternal GBS status is known postive.  2)  Maternal GBS status unknown with these risk factors:  a)  Previous infant affected by GBS infection.  b)  GBS urinary tract infection (UTI) or bacteruria during pregnancy  c)  Unexplained maternal fever in labor (greater than or equal to 100.4F o  or 38.0C)  d)  Prolonged rupture of the membranes greater than or equal to 18 hours.  e)  Gestational age less than 37 weeks.    22  Bedside ultrasound for fetal presentation  Once,   Status:  Canceled         22  Confirm presence of amniotic fluid  Once,   Status:  Canceled        Comments: If patient present with SROM    22  Keep exams to a minimum on patients with SROM  Continuous,   Status:  Canceled         22  Continuous Fetal Monitoring With NST on Admission and Prior to Initiation of Oxytocin.  Per Order Details,   Status:  Canceled        Comments: Continuous Fetal Monitoring With NST on Admission & Prior to Initiation of Oxytocin.    22   "External Uterine Contraction Monitoring  Per Hospital Policy,   Status:  Canceled         08/20/22 0020    08/20/22 0021  Notify Physician (specified)  Until Discontinued,   Status:  Canceled         08/20/22 0020 08/20/22 0021  Notify physician for tachysystole (per hospital algorithm)  Until Discontinued,   Status:  Canceled         08/20/22 0020    08/20/22 0021  Notify physician if membranes ruptured, bleeding greater than 1 pad an hour, fetal heart tone abnormality, and severe pain  Until Discontinued,   Status:  Canceled         08/20/22 0020    08/20/22 0021  Diet Clear Liquid  Diet Effective Now,   Status:  Canceled         08/20/22 0020    08/20/22 0021  CBC (No Diff)  Once         08/20/22 0020 08/20/22 0021  Type & Screen  Once         08/20/22 0020 08/20/22 0021  Insert Peripheral IV  Once,   Status:  Canceled         08/20/22 0020 08/20/22 0021  Saline Lock & Maintain IV Access  Continuous,   Status:  Canceled         08/20/22 0020 08/20/22 0020  Intake and Output  Every Shift,   Status:  Canceled      Comments: Every shift if on IV Tocolytics.    08/20/22 0020 08/20/22 0020  Position Change - For Intra-Uterine Resusitation for Hypertonus, HyperStimulation or Non-Reassuring Fetal Status  As Needed,   Status:  Canceled       08/20/22 0020 08/20/22 0020  lidocaine PF 1% (XYLOCAINE) injection 5 mL  As Needed,   Status:  Discontinued         08/20/22 0020 08/20/22 0020  sodium chloride 0.9 % flush 1-10 mL  As Needed,   Status:  Discontinued         08/20/22 0020 08/20/22 0020  lactated ringers bolus 1,000 mL  Once As Needed         08/20/22 0020 08/20/22 0020  ondansetron (ZOFRAN) tablet 4 mg  Every 6 Hours PRN,   Status:  Discontinued        \"Or\" Linked Group Details    08/20/22 0020    08/20/22 0020  ondansetron (ZOFRAN) injection 4 mg  Every 6 Hours PRN,   Status:  Discontinued        \"Or\" Linked Group Details    08/20/22 0020    08/20/22 0020  metoclopramide (REGLAN) " injection 10 mg  Every 6 Hours PRN,   Status:  Discontinued         22  terbutaline (BRETHINE) injection 0.25 mg  As Needed,   Status:  Discontinued         22                   Operative/Procedure Notes (last 7 days)      Saritha Nelson MD at 22 0014          Vaginal delivery note    2022    Patient:Viry Theodore    MR#:[id number]    27 y.o. yo female  at 39w1d     Patient Active Problem List   Diagnosis   • Supervision of normal first pregnancy, antepartum   • Abnormal glucose tolerance affecting pregnancy, antepartum   • Supervision of low-risk pregnancy, third trimester   •  (normal spontaneous vaginal delivery)       Delivery     Delivery: Vaginal, Spontaneous     YOB: 2020    Time of Birth: 11:50 PM      Anesthesia: Epidural     Delivering clinician: Saritha Nelson    Forceps?   No   Vacuum? No    Shoulder dystocia present: No        The patient is admitted for elective induction of labor at term. She received cervidil for cervical ripening. It was removed and a cook catheter was placed and pitocin started.  The patient entered an active phase of labor and progressed along a normal labor curve to complete dilatation at +2 station.    Pushing for about fifteen minutes, the patient delivered a live viable female infant in right occiput anterior position. The anterior and posterior shoulder delivered without difficulty.  No nuchal cord was identified. The body was delivered atraumatically.  The infant's nose and oropharynx were suctioned and cleared of secretions.  Cord clamping was delayed a minimum of 30 seconds then was clamped and cut.  The infant was placed on the mom's chest for bonding and care.    The placenta was expressed and delivered intact. It appeared normal. A uterine sweep revealed no retained products. Her bladder was drained of approximately 100 mL urine.    A posterior midline second degree was repaired  with 2-0 vicryl and a right labial was repaired with 3-0 vicryl.     Quantitative blood loss: Quantitative Blood Loss (mL): 262 mL    Summary:  Uncomplicated Vaginal delivery      Infant    Findings: female  infant     Infant observations: Weight: No birth weight on file.     Observations/Comments:  Joao Robertson 3      Apgars: 8  @ 1 minute /    8  @ 5 minutes   Infant Name: Nata Jones     Placenta, Cord, and Fluid    Placenta delivered  Spontaneous  at  8/20/2022 11:55 PM     Cord: 3 vessels  present.   Nuchal Cord?  no   Cord blood obtained: Yes    Cord gases obtained:  Yes    Cord gas results: pH, Cord Venous   Date Value Ref Range Status   08/21/2022 7.313 7.260 - 7.400 pH Units Final            Repair    Episiotomy: No   Lacerations: Yes  Laceration Information  Laceration Repaired?   Perineal: 2nd      Periurethral:       Labial:       Sulcus:       Vaginal:       Cervical:          Suture used for repair: 2-0 Vicryl and 3-0 Vicryl     Complications  none    Disposition  Mother to Mother Baby/Postpartum  in stable condition currently.  Baby to remains with mom  in stable condition currently.    [x]  Labs reviewed:  Bld A+   Rubella immune  Varicella immune  Tdap received      Saritha Nelson MD  08/21/22  00:21 EDT            Electronically signed by Saritha Nelson MD at 08/21/22 0021          Physician Progress Notes (last 7 days)      Radha Peraza MD at 08/21/22 1005          [facility]  Vaginal Delivery Progress Note    Subjective   Postpartum Day 1/2: Vaginal Delivery 2350 last pm    The patient feels well.  Her pain is well controlled.   She is ambulating well.  Patient describes her bleeding as staining only.    Breastfeeding: infant latching.    ROS:  Pulm: neg for shortness of air  : neg for heavy bleeding  Musculoskeletal: neg for leg pain    Objective     Vital Signs Range for the last 24 hours  Temperature: Temp:  [97.5 °F (36.4 °C)-99.2 °F (37.3 °C)] 98 °F (36.7 °C)   Temp Source:  "Temp src: Oral   BP: BP: ()/(43-85) 115/73   Pulse: Heart Rate:  [] 97   Respirations: Resp:  [14-18] 17   SPO2: SpO2:  [96 %-100 %] 100 %   O2 Amount (l/min):     O2 Devices     Weight:       Admit Height:  Height: 162.6 cm (64\")      Physical Exam:  General:  no acute distress.  Abdomen: Fundus: appropriate, firm, non tender  Extremities: normal, atraumatic, no cyanosis, and trace edema.       Lab results reviewed:  Postpartum CBC ordered for tomorrow am  Rubella:   prenatal record --    Rubella Antibodies, IgG   Date Value Ref Range Status   2021 8.23 Immune >0.99 index Final     Comment:                                     Non-immune       <0.90                                  Equivocal  0.90 - 0.99                                  Immune           >0.99       Rh Status:    RH type   Date Value Ref Range Status   2022 Positive  Final     Rh Factor   Date Value Ref Range Status   2021 Positive  Final     Comment:     Please note: Prior records for this patient's ABO / Rh type are not  available for additional verification.       Immunizations:   Immunization History   Administered Date(s) Administered   • COVID-19 (PFIZER) PURPLE CAP 2020, 2021       Assessment & Plan       Supervision of normal first pregnancy, antepartum    Supervision of low-risk pregnancy, third trimester     (normal spontaneous vaginal delivery)      Viry Theodore is Day 1/2  post-partum  Vaginal, Spontaneous  : pt is doing well and denies any issues    Plan:  Continue current care.      Radha Peraza MD  2022  10:06 EDT    Electronically signed by Radha Peraza MD at 22 1014       Consult Notes (last 7 days)  Notes from 08/15/22 through 22   No notes of this type exist for this encounter.            Discharge Summary      Autumn Ambrose MD at 22 0802          [facility]   Obstetrics and Gynecology   Vaginal delivery Discharge " Summary      Date of Admission: 2022    Date of Discharge:        Patient: Viry Theodore      MR#:[id number]    Surgeon/OB: Saritha Nelson     Discharge Diagnosis: Vaginal Delivery at 39w0d, uncomplicated recovery    Procedures:  Vaginal, Spontaneous     2022    11:50 PM      Anesthesia:  Epidural     Presenting Problem/History of Present Illness  Supervision of low-risk pregnancy, third trimester [Z34.93]  Supervision of normal first pregnancy, antepartum [Z34.00]     Patient Active Problem List   Diagnosis   • Supervision of normal first pregnancy, antepartum   • Abnormal glucose tolerance affecting pregnancy, antepartum   • Supervision of low-risk pregnancy, third trimester   •  (normal spontaneous vaginal delivery)       Hospital Course  Patient is a 27 y.o. female  at 39w0d status post vaginal delivery.    Uneventful recovery.  Patient is ambulating, tolerating a regular diet.  Perineum is intact.    Infant:   female  fetus 3690 g (8 lb 2.2 oz)  with Apgar scores of 8 , 8  at five minutes.    Condition on Discharge:  Stable    Vital Signs  Temp:  [97.5 °F (36.4 °C)-98 °F (36.7 °C)] 97.9 °F (36.6 °C)  Heart Rate:  [78-97] 78  Resp:  [17-18] 18  BP: (111-133)/(72-85) 133/85    Results from last 7 days   Lab Units 22  0540 22  0123   WBC 10*3/mm3 10.83* 9.55   HEMOGLOBIN g/dL 12.6 12.7   HEMATOCRIT % 37.3 37.9   PLATELETS 10*3/mm3 209 216             Discharge Disposition  Home or Self Care    Discharge Medications     Discharge Medications      New Medications      Instructions Start Date   ibuprofen 800 MG tablet  Commonly known as: ADVIL,MOTRIN   800 mg, Oral, Every 8 Hours PRN         Continue These Medications      Instructions Start Date   calcium carbonate 500 MG chewable tablet  Commonly known as: TUMS   1 tablet, Oral, Daily      docusate sodium 100 MG capsule   100 mg, Oral      ondansetron ODT 4 MG disintegrating tablet  Commonly known as: Zofran ODT   4 mg,  Translingual, Every 6 Hours PRN      PRENATAL+DHA PO   Oral      promethazine 12.5 MG tablet  Commonly known as: PHENERGAN   12.5 mg, Oral, Every 6 Hours PRN             Discharge Diet: Regular    Activity at Discharge:   Activity Instructions     Pelvic Rest            Follow-up Appointments  No future appointments.  Additional Instructions for the Follow-ups that You Need to Schedule     Call MD for problems / concerns.   As directed      Heavy bleeding:  Greater than a pad an hour for more than 2 hours  Fever greater than 101.3   Redness of the episiotomy or vaginal laceration and/or severe pain increased from discharge pain.      Order Comments: Heavy bleeding:  Greater than a pad an hour for more than 2 hours Fever greater than 101.3 Redness of the episiotomy or vaginal laceration and/or severe pain increased from discharge pain.           Discharge Follow-up with Specified Provider: Leslie; 2 Weeks   As directed      To: Lesile    Follow Up: 2 Weeks                 Prenatal labs/vax:   Immunization History   Administered Date(s) Administered   • COVID-19 (PFIZER) PURPLE CAP 12/17/2020, 01/07/2021         External Prenatal Results     Pregnancy Outside Results - Transcribed From Office Records - See Scanned Records For Details     Test Value Date Time    ABO  A  08/20/22 0123    Rh  Positive  08/20/22 0123    Antibody Screen  Negative  08/20/22 0123       Negative  12/28/21 1159    Varicella IgG       Rubella  8.23 index 12/28/21 1159    Hgb  12.6 g/dL 08/22/22 0540       12.7 g/dL 08/20/22 0123       12.7 g/dL 06/01/22 1133       15.6 g/dL 12/28/21 1159    Hct  37.3 % 08/22/22 0540       37.9 % 08/20/22 0123       37.5 % 06/01/22 1133       45.5 % 12/28/21 1159    Glucose Fasting GTT  84 mg/dL 06/03/22 0815    Glucose Tolerance Test 1 hour  135 mg/dL 06/03/22 0815    Glucose Tolerance Test 3 hour  110 mg/dL 06/03/22 0815    Gonorrhea (discrete)  Negative  04/26/21 1155    Chlamydia (discrete)  Negative   04/26/21 1155    RPR  Non Reactive  12/28/21 1159    VDRL       Syphilis Antibody       HBsAg  Negative  12/28/21 1159    Herpes Simplex Virus PCR       Herpes Simplex VIrus Culture       HIV  Non Reactive  12/28/21 1159    Hep C RNA Quant PCR       Hep C Antibody  <0.1 s/co ratio 12/28/21 1159    AFP  58.2 ng/mL 04/06/22 1341    Group B Strep  Negative  08/01/22 1117    GBS Susceptibility to Clindamycin       GBS Susceptibility to Erythromycin       Fetal Fibronectin       Genetic Testing, Maternal Blood             Drug Screening     Test Value Date Time    Urine Drug Screen       Amphetamine Screen       Barbiturate Screen       Benzodiazepine Screen       Methadone Screen       Phencyclidine Screen       Opiates Screen       THC Screen       Cocaine Screen       Propoxyphene Screen       Buprenorphine Screen       Methamphetamine Screen       Oxycodone Screen       Tricyclic Antidepressants Screen             Legend    ^: Historical                          Autumn Ambrose MD  08/22/22  08:58 EDT              Electronically signed by Autumn Ambrose MD at 08/22/22 0858

## 2022-08-22 NOTE — LACTATION NOTE
Mom reports baby has been cluster feeding and having a lot of BM's. Educated on baby's expected output and weight gain. Encouraged mom to review provided booklet on BF. Mom has [facility] info    Lactation Consult Note    Evaluation Completed: 2022 08:23 EDT  Patient Name: Viry Theodore  :  1995  MRN:  [id number]     REFERRAL  INFORMATION:                          Date of Referral: 22   Person Making Referral: lactation consultant  Maternal Reason for Referral: breastfeeding currently, no prior breastfeeding experience       DELIVERY HISTORY:        Skin to skin initiation date/time: 2022  11:51 PM   Skin to skin end date/time: 2022  12:50 AM        MATERNAL ASSESSMENT:                               INFANT ASSESSMENT:  Information for the patient's :  Jeff TheodoreKinza [[id number]]   No past medical history on file.                                                                                                     MATERNAL INFANT FEEDING:                                                                       EQUIPMENT TYPE:                                 BREAST PUMPING:          LACTATION REFERRALS:

## 2022-08-22 NOTE — DISCHARGE SUMMARY
[facility]   Obstetrics and Gynecology   Vaginal delivery Discharge Summary      Date of Admission: 2022    Date of Discharge:        Patient: Viry Theodore      MR#:[id number]    Surgeon/OB: Saritha Nelson     Discharge Diagnosis: Vaginal Delivery at 39w0d, uncomplicated recovery    Procedures:  Vaginal, Spontaneous     2022    11:50 PM      Anesthesia:  Epidural     Presenting Problem/History of Present Illness  Supervision of low-risk pregnancy, third trimester [Z34.93]  Supervision of normal first pregnancy, antepartum [Z34.00]     Patient Active Problem List   Diagnosis   • Supervision of normal first pregnancy, antepartum   • Abnormal glucose tolerance affecting pregnancy, antepartum   • Supervision of low-risk pregnancy, third trimester   •  (normal spontaneous vaginal delivery)       Hospital Course  Patient is a 27 y.o. female  at 39w0d status post vaginal delivery.    Uneventful recovery.  Patient is ambulating, tolerating a regular diet.  Perineum is intact.    Infant:   female  fetus 3690 g (8 lb 2.2 oz)  with Apgar scores of 8 , 8  at five minutes.    Condition on Discharge:  Stable    Vital Signs  Temp:  [97.5 °F (36.4 °C)-98 °F (36.7 °C)] 97.9 °F (36.6 °C)  Heart Rate:  [78-97] 78  Resp:  [17-18] 18  BP: (111-133)/(72-85) 133/85    Results from last 7 days   Lab Units 22  0540 22  0123   WBC 10*3/mm3 10.83* 9.55   HEMOGLOBIN g/dL 12.6 12.7   HEMATOCRIT % 37.3 37.9   PLATELETS 10*3/mm3 209 216             Discharge Disposition  Home or Self Care    Discharge Medications     Discharge Medications      New Medications      Instructions Start Date   ibuprofen 800 MG tablet  Commonly known as: ADVIL,MOTRIN   800 mg, Oral, Every 8 Hours PRN         Continue These Medications      Instructions Start Date   calcium carbonate 500 MG chewable tablet  Commonly known as: TUMS   1 tablet, Oral, Daily      docusate sodium 100 MG capsule   100 mg, Oral       ondansetron ODT 4 MG disintegrating tablet  Commonly known as: Zofran ODT   4 mg, Translingual, Every 6 Hours PRN      PRENATAL+DHA PO   Oral      promethazine 12.5 MG tablet  Commonly known as: PHENERGAN   12.5 mg, Oral, Every 6 Hours PRN             Discharge Diet: Regular    Activity at Discharge:   Activity Instructions     Pelvic Rest            Follow-up Appointments  No future appointments.  Additional Instructions for the Follow-ups that You Need to Schedule     Call MD for problems / concerns.   As directed      Heavy bleeding:  Greater than a pad an hour for more than 2 hours  Fever greater than 101.3   Redness of the episiotomy or vaginal laceration and/or severe pain increased from discharge pain.      Order Comments: Heavy bleeding:  Greater than a pad an hour for more than 2 hours Fever greater than 101.3 Redness of the episiotomy or vaginal laceration and/or severe pain increased from discharge pain.           Discharge Follow-up with Specified Provider: Leslie; 2 Weeks   As directed      To: Leslie    Follow Up: 2 Weeks                 Prenatal labs/vax:   Immunization History   Administered Date(s) Administered   • COVID-19 (PFIZER) PURPLE CAP 12/17/2020, 01/07/2021         External Prenatal Results     Pregnancy Outside Results - Transcribed From Office Records - See Scanned Records For Details     Test Value Date Time    ABO  A  08/20/22 0123    Rh  Positive  08/20/22 0123    Antibody Screen  Negative  08/20/22 0123       Negative  12/28/21 1159    Varicella IgG       Rubella  8.23 index 12/28/21 1159    Hgb  12.6 g/dL 08/22/22 0540       12.7 g/dL 08/20/22 0123       12.7 g/dL 06/01/22 1133       15.6 g/dL 12/28/21 1159    Hct  37.3 % 08/22/22 0540       37.9 % 08/20/22 0123       37.5 % 06/01/22 1133       45.5 % 12/28/21 1159    Glucose Fasting GTT  84 mg/dL 06/03/22 0815    Glucose Tolerance Test 1 hour  135 mg/dL 06/03/22 0815    Glucose Tolerance Test 3 hour  110 mg/dL 06/03/22 0815     Gonorrhea (discrete)  Negative  04/26/21 1155    Chlamydia (discrete)  Negative  04/26/21 1155    RPR  Non Reactive  12/28/21 1159    VDRL       Syphilis Antibody       HBsAg  Negative  12/28/21 1159    Herpes Simplex Virus PCR       Herpes Simplex VIrus Culture       HIV  Non Reactive  12/28/21 1159    Hep C RNA Quant PCR       Hep C Antibody  <0.1 s/co ratio 12/28/21 1159    AFP  58.2 ng/mL 04/06/22 1341    Group B Strep  Negative  08/01/22 1117    GBS Susceptibility to Clindamycin       GBS Susceptibility to Erythromycin       Fetal Fibronectin       Genetic Testing, Maternal Blood             Drug Screening     Test Value Date Time    Urine Drug Screen       Amphetamine Screen       Barbiturate Screen       Benzodiazepine Screen       Methadone Screen       Phencyclidine Screen       Opiates Screen       THC Screen       Cocaine Screen       Propoxyphene Screen       Buprenorphine Screen       Methamphetamine Screen       Oxycodone Screen       Tricyclic Antidepressants Screen             Legend    ^: Historical                          Autumn Ambrose MD  08/22/22  08:58 EDT

## 2022-08-22 NOTE — PLAN OF CARE
Goal Outcome Evaluation:  Plan of Care Reviewed With: patient        Progress: improving  Outcome Evaluation: VSS, assessment WDL, breastfeeding and bonding with infant, anticipating discharge home later today

## 2022-08-23 ENCOUNTER — TELEPHONE (OUTPATIENT)
Dept: OBSTETRICS AND GYNECOLOGY | Age: 27
End: 2022-08-23

## 2022-08-23 NOTE — TELEPHONE ENCOUNTER
I did place paperwork the other day on your desk for her. Let me know if you need anything else from me

## 2022-08-24 NOTE — TELEPHONE ENCOUNTER
Pt notified [facility] paperwork is ready and just waiting on dr reyna's signature. Letter emailed to pt for letter for spouse per pt request.

## 2022-09-01 ENCOUNTER — POSTPARTUM VISIT (OUTPATIENT)
Dept: OBSTETRICS AND GYNECOLOGY | Age: 27
End: 2022-09-01

## 2022-09-01 VITALS
DIASTOLIC BLOOD PRESSURE: 70 MMHG | SYSTOLIC BLOOD PRESSURE: 120 MMHG | BODY MASS INDEX: 27.52 KG/M2 | WEIGHT: 161.2 LBS | HEIGHT: 64 IN

## 2022-09-01 PROCEDURE — 0503F POSTPARTUM CARE VISIT: CPT | Performed by: OBSTETRICS & GYNECOLOGY

## 2022-09-08 NOTE — PROGRESS NOTES
"Chief Complaint   Patient presents with   • Postpartum Follow-up     2 week PP Check, vaginal delivery, baby gril, \"Nata Jones\", breast feeding, posterior midline second degree, last pap 4/26/2021 (-), interested in OCP      Viry Gertrude Theodore is doing well.  She feels as though her perineum is healing well, no bleeding issues.  They are breast-feeding, no issues  Baby girl Nata Jones is healthy  No mood complaints, just tired    Discussed contraception, desires OCP    Follow-up for 6 to 8-week postpartum visit    Saritha Nelson MD  9/8/2022  18:20 EDT    "

## 2022-09-19 ENCOUNTER — TELEPHONE (OUTPATIENT)
Dept: OBSTETRICS AND GYNECOLOGY | Age: 27
End: 2022-09-19

## 2022-09-19 NOTE — TELEPHONE ENCOUNTER
It is recommended that she wait until she is 6 wks pp. Same with exercise. She could resume walking if she tolerate that.

## 2022-09-19 NOTE — TELEPHONE ENCOUNTER
PT IS CALLING IN REGARDS TO KNOW IF IT IS SAFE FOR HER TO GO SWIMMING AT 5 WEEKS POSTPARTUM, AND IF IT IS SAFE TO START EXERCISING BEFORE HER POSTPARTUM VISIT ON 10/10/22. SHE CAN BE REACHED ANYTIME, OK TO LVM.

## 2022-10-10 ENCOUNTER — POSTPARTUM VISIT (OUTPATIENT)
Dept: OBSTETRICS AND GYNECOLOGY | Age: 27
End: 2022-10-10

## 2022-10-10 VITALS
DIASTOLIC BLOOD PRESSURE: 78 MMHG | WEIGHT: 161.8 LBS | HEIGHT: 64 IN | SYSTOLIC BLOOD PRESSURE: 124 MMHG | BODY MASS INDEX: 27.62 KG/M2

## 2022-10-10 PROBLEM — Z34.00 SUPERVISION OF NORMAL FIRST PREGNANCY, ANTEPARTUM: Status: RESOLVED | Noted: 2022-02-02 | Resolved: 2022-10-10

## 2022-10-10 PROBLEM — Z34.93 SUPERVISION OF LOW-RISK PREGNANCY, THIRD TRIMESTER: Status: RESOLVED | Noted: 2022-08-20 | Resolved: 2022-10-10

## 2022-10-10 PROBLEM — O99.810 ABNORMAL GLUCOSE TOLERANCE AFFECTING PREGNANCY, ANTEPARTUM: Status: RESOLVED | Noted: 2022-06-15 | Resolved: 2022-10-10

## 2022-10-10 PROCEDURE — 0503F POSTPARTUM CARE VISIT: CPT | Performed by: OBSTETRICS & GYNECOLOGY

## 2022-10-10 RX ORDER — LEVONORGESTREL AND ETHINYL ESTRADIOL 0.1-0.02MG
1 KIT ORAL DAILY
Qty: 84 TABLET | Refills: 3 | Status: SHIPPED | OUTPATIENT
Start: 2022-10-10 | End: 2023-03-07

## 2022-10-10 RX ORDER — ACETAMINOPHEN AND CODEINE PHOSPHATE 120; 12 MG/5ML; MG/5ML
1 SOLUTION ORAL DAILY
Qty: 84 TABLET | Refills: 3 | Status: SHIPPED | OUTPATIENT
Start: 2022-10-10 | End: 2023-03-07

## 2022-10-10 NOTE — PROGRESS NOTES
"Office Postpartum Visit    Chief Complaint   Patient presents with   • Postpartum Follow-up     7 week PP Check, vaginal delivery, baby gril, \"Nata Jones\", breast feeding, posterior midline second degree, last pap 4/26/2021 (-), interested in OCP        Subjective   Viry Theodore is a 27 y.o. female who presents for a postpartum visit. She is 7 weeks postpartum following a spontaneous vaginal delivery. I have fully reviewed the prenatal and intrapartum course. The delivery was at 39 gestational weeks. Outcome: spontaneous vaginal delivery. Anesthesia: epidural. Postpartum course has been uncomplicated. Baby's course has been uncomplicated. Baby is feeding by breast. Bleeding no bleeding. Bowel function is normal. Bladder function is normal. Patient has been sexually active. Contraception method is OCP. Postpartum depression screening: negative.    The following portions of the patient's history were reviewed and updated as appropriate: allergies, current medications, past family history, past medical history, past social history, past surgical history and problem list.      Objective   /78   Ht 162.6 cm (64\")   Wt 73.4 kg (161 lb 12.8 oz)   Breastfeeding Yes   BMI 27.77 kg/m²    General:  alert, appears stated age and cooperative               Abdomen: soft, non-tender; bowel sounds normal; no masses,  no organomegaly    Vulva:  normal   Vagina: normal vagina, no discharge, exudate, lesion, or erythema   Cervix:  multiparous appearance and no lesions   Assessment & Plan     No diagnosis found.  7 wk postpartum exam. Pap smear UTD    1. Contraception: OCP  2. Doing well  3. Follow up in: 1 year or as needed.           Saritha Nelson MD  10/10/2022  15:15 EDT    Answers for HPI/ROS submitted by the patient on 10/8/2022  Please describe your symptoms.: No symptoms. Final postpartum visit.  Have you had these symptoms before?: No  How long have you been having these symptoms?: 1-4 days  Please list " any medications you are currently taking for this condition.: None  Please describe any probable cause for these symptoms. : None  What is the primary reason for your visit?: Other

## 2023-01-21 ENCOUNTER — TELEMEDICINE (OUTPATIENT)
Dept: FAMILY MEDICINE CLINIC | Facility: TELEHEALTH | Age: 28
End: 2023-01-21
Payer: COMMERCIAL

## 2023-01-21 DIAGNOSIS — J32.9 SINUSITIS, UNSPECIFIED CHRONICITY, UNSPECIFIED LOCATION: Primary | ICD-10-CM

## 2023-01-21 PROCEDURE — 99213 OFFICE O/P EST LOW 20 MIN: CPT | Performed by: NURSE PRACTITIONER

## 2023-01-21 RX ORDER — PREDNISONE 10 MG/1
10 TABLET ORAL DAILY
Qty: 5 TABLET | Refills: 0 | Status: SHIPPED | OUTPATIENT
Start: 2023-01-21 | End: 2023-01-26

## 2023-01-21 RX ORDER — BENZONATATE 200 MG/1
200 CAPSULE ORAL 3 TIMES DAILY PRN
Qty: 30 CAPSULE | Refills: 0 | Status: SHIPPED | OUTPATIENT
Start: 2023-01-21 | End: 2023-01-31

## 2023-01-21 RX ORDER — AZITHROMYCIN 250 MG/1
TABLET, FILM COATED ORAL
Qty: 6 TABLET | Refills: 0 | Status: SHIPPED | OUTPATIENT
Start: 2023-01-21 | End: 2023-03-07

## 2023-01-21 NOTE — PROGRESS NOTES
Chief Complaint   Patient presents with   • Cough   • Nasal Congestion   • Sinusitis       Subjective   Viry Theodore is a 27 y.o. female.     History of Present Illness  Nasal congestion, sinus congestion, postnasal drainage, hoarseness and increasing dry cough for the last 2 weeks. Cough has progressed in frequency and is every few minutes. She coughs every time that she tries to speak. She has been breastfeeding up until 2 days ago when she stopped. She has taken tylenol and sudafed but it has not helped.  Cough  The current episode started 1 to 4 weeks ago. The problem has been gradually worsening. The cough is non-productive. Associated symptoms include headaches, nasal congestion and postnasal drip. Pertinent negatives include no chest pain, chills, ear congestion, ear pain, fever, myalgias, rhinorrhea, sore throat, shortness of breath or wheezing. The symptoms are aggravated by lying down. She has tried OTC cough suppressant for the symptoms. The treatment provided no relief. Her past medical history is significant for environmental allergies. There is no history of asthma.   Sinusitis  There has been no fever. Associated symptoms include congestion, coughing, headaches, a hoarse voice and sinus pressure. Pertinent negatives include no chills, ear pain, shortness of breath, sore throat or swollen glands. Past treatments include oral decongestants and acetaminophen. The treatment provided no relief.       The following portions of the patient's history were reviewed and updated as appropriate: allergies, current medications, past medical history, and problem list.      Past Medical History:   Diagnosis Date   • Dysmenorrhea    • Migraine    • Raynaud's syndrome    • UTI (urinary tract infection)      Social History     Socioeconomic History   • Marital status:    Tobacco Use   • Smoking status: Never   • Smokeless tobacco: Never   Vaping Use   • Vaping Use: Never used   Substance and Sexual  Activity   • Alcohol use: Not Currently     Alcohol/week: 3.0 standard drinks     Types: 3 Glasses of wine per week     Comment: I may have a few glasses of wine on the weekends or socially   • Drug use: No   • Sexual activity: Yes     Partners: Male     Birth control/protection: OCP     Comment:  Ady     medication documentation: reviewed and updated with patient and   Current Outpatient Medications:   •  levonorgestrel-ethinyl estradiol (AVIANE,ALESSE,LESSINA) 0.1-20 MG-MCG per tablet, Take 1 tablet by mouth Daily., Disp: 84 tablet, Rfl: 3  •  azithromycin (Zithromax Z-Harley) 250 MG tablet, Take 2 tabs on Day 1, then 1 tab daily for 4 additional days, Disp: 6 tablet, Rfl: 0  •  benzonatate (TESSALON) 200 MG capsule, Take 1 capsule by mouth 3 (Three) Times a Day As Needed for Cough for up to 10 days., Disp: 30 capsule, Rfl: 0  •  norethindrone (MICRONOR) 0.35 MG tablet, Take 1 tablet by mouth Daily., Disp: 84 tablet, Rfl: 3  •  predniSONE (DELTASONE) 10 MG tablet, Take 1 tablet by mouth Daily for 5 days., Disp: 5 tablet, Rfl: 0  Review of Systems   Constitutional: Negative for chills and fever.   HENT: Positive for congestion, hoarse voice, postnasal drip, sinus pressure, sinus pain and voice change. Negative for ear pain, rhinorrhea and sore throat.    Respiratory: Positive for cough. Negative for chest tightness, shortness of breath and wheezing.    Cardiovascular: Negative for chest pain.   Musculoskeletal: Negative for myalgias.   Allergic/Immunologic: Positive for environmental allergies.   Neurological: Positive for headaches. Negative for dizziness and light-headedness.   Hematological: Negative for adenopathy.       Objective   Physical Exam  Constitutional:       Appearance: Normal appearance. She is well-developed.   HENT:      Nose: Congestion present.      Right Sinus: Maxillary sinus tenderness present.      Left Sinus: Maxillary sinus tenderness present.   Eyes:      Conjunctiva/sclera:  Conjunctivae normal.   Pulmonary:      Effort: Pulmonary effort is normal.   Lymphadenopathy:      Head:      Right side of head: No tonsillar, preauricular or posterior auricular adenopathy.      Left side of head: No tonsillar, preauricular or posterior auricular adenopathy.      Cervical: No cervical adenopathy (patient guided exam).   Psychiatric:         Speech: Speech normal.         Assessment & Plan   Diagnoses and all orders for this visit:    1. Sinusitis, unspecified chronicity, unspecified location (Primary)  -     azithromycin (Zithromax Z-Harley) 250 MG tablet; Take 2 tabs on Day 1, then 1 tab daily for 4 additional days  Dispense: 6 tablet; Refill: 0  -     predniSONE (DELTASONE) 10 MG tablet; Take 1 tablet by mouth Daily for 5 days.  Dispense: 5 tablet; Refill: 0  -     benzonatate (TESSALON) 200 MG capsule; Take 1 capsule by mouth 3 (Three) Times a Day As Needed for Cough for up to 10 days.  Dispense: 30 capsule; Refill: 0                    Follow Up:  If your symptoms are not resolving by the completion of your treatment or are worsening, see your primary care provider for follow up. If you don't have a primary care provider, you may go to any Urgent Care for re-evaluation. If you develop any life threatening symptoms, go to the nearest Emergency Department immediately or call EMS.               The use of  Video Visit was utilized during this visit, using both MyChart and Zoom. The use of a video visit has been reviewed with the patient and verbal informed consent has been obtained. No technical difficulties occurred during the visit.    is located at [address] [place] [id number]  Provider is located at [place], [place]

## 2023-03-07 ENCOUNTER — TELEMEDICINE (OUTPATIENT)
Dept: FAMILY MEDICINE CLINIC | Facility: TELEHEALTH | Age: 28
End: 2023-03-07
Payer: COMMERCIAL

## 2023-03-07 DIAGNOSIS — K52.9 GASTROENTERITIS: Primary | ICD-10-CM

## 2023-03-07 PROCEDURE — 99213 OFFICE O/P EST LOW 20 MIN: CPT | Performed by: NURSE PRACTITIONER

## 2023-03-07 RX ORDER — ONDANSETRON 8 MG/1
8 TABLET, ORALLY DISINTEGRATING ORAL EVERY 8 HOURS PRN
Qty: 15 TABLET | Refills: 0 | Status: SHIPPED | OUTPATIENT
Start: 2023-03-07

## 2023-03-07 NOTE — PATIENT INSTRUCTIONS
Viral Gastroenteritis, Adult  Viral gastroenteritis is also known as the stomach flu. This condition may affect your stomach, small intestine, and large intestine. It can cause sudden watery diarrhea, fever, and vomiting. This condition is caused by many different viruses. These viruses can be passed from person to person very easily (are contagious).  Diarrhea and vomiting can make you feel weak and cause you to become dehydrated. You may not be able to keep fluids down. Dehydration can make you tired and thirsty, cause you to have a dry mouth, and decrease how often you urinate. It is important to replace the fluids that you lose from diarrhea and vomiting.  What are the causes?  Gastroenteritis is caused by many viruses, including rotavirus and norovirus. Norovirus is the most common cause in adults. You can get sick after being exposed to the viruses from other people. You can also get sick by:  Eating food, drinking water, or touching a surface contaminated with one of these viruses.  Sharing utensils or other personal items with an infected person.  What increases the risk?  You are more likely to develop this condition if you:  Have a weak body defense system (immune system).  Live with one or more children who are younger than 2 years old.  Live in a nursing home.  Travel on cruise ships.  What are the signs or symptoms?  Symptoms of this condition start suddenly 1-3 days after exposure to a virus. Symptoms may last for a few days or for as long as a week. Common symptoms include watery diarrhea and vomiting. Other symptoms include:  Fever.  Headache.  Fatigue.  Pain in the abdomen.  Chills.  Weakness.  Nausea.  Muscle aches.  Loss of appetite.  How is this diagnosed?  This condition is diagnosed with a medical history and physical exam. You may also have a stool test to check for viruses or other infections.  How is this treated?  This condition typically goes away on its own. The focus of treatment is to  prevent dehydration and restore lost fluids (rehydration). This condition may be treated with:  An oral rehydration solution (ORS) to replace important salts and minerals (electrolytes) in your body. Take this if told by your health care provider. This is a drink that is sold at pharmacies and retail stores.  Medicines to help with your symptoms.  Probiotic supplements to reduce symptoms of diarrhea.  Fluids given through an IV, if dehydration is severe.  Older adults and people with other diseases or a weak immune system are at higher risk for dehydration.  Follow these instructions at home:  Eating and drinking    Take an ORS as told by your health care provider.  Drink clear fluids in small amounts as you are able. Clear fluids include:  Water.  Ice chips.  Diluted fruit juice.  Low-calorie sports drinks.  Drink enough fluid to keep your urine pale yellow.  Eat small amounts of healthy foods every 3-4 hours as you are able. This may include whole grains, fruits, vegetables, lean meats, and yogurt.  Avoid fluids that contain a lot of sugar or caffeine, such as energy drinks, sports drinks, and soda.  Avoid spicy or fatty foods.  Avoid alcohol.  General instructions  Wash your hands often, especially after having diarrhea or vomiting. If soap and water are not available, use hand .  Make sure that all people in your household wash their hands well and often.  Take over-the-counter and prescription medicines only as told by your health care provider.  Rest at home while you recover.  Watch your condition for any changes.  Take a warm bath to relieve any burning or pain from frequent diarrhea episodes.  Keep all follow-up visits as told by your health care provider. This is important.  Contact a health care provider if you:  Cannot keep fluids down.  Have symptoms that get worse.  Have new symptoms.  Feel light-headed or dizzy.  Have muscle cramps.  Get help right away if you:  Have chest pain.  Feel  extremely weak or you faint.  See blood in your vomit.  Have vomit that looks like coffee grounds.  Have bloody or black stools or stools that look like tar.  Have a severe headache, a stiff neck, or both.  Have a rash.  Have severe pain, cramping, or bloating in your abdomen.  Have trouble breathing or you are breathing very quickly.  Have a fast heartbeat.  Have skin that feels cold and clammy.  Feel confused.  Have pain when you urinate.  Have signs of dehydration, such as:  Dark urine, very little urine, or no urine.  Cracked lips.  Dry mouth.  Sunken eyes.  Sleepiness.  Weakness.  Summary  Viral gastroenteritis is also known as the stomach flu. It can cause sudden watery diarrhea, fever, and vomiting.  This condition can be passed from person to person very easily (is contagious).  Take an ORS if told by your health care provider. This is a drink that is sold at pharmacies and retail stores.  Wash your hands often, especially after having diarrhea or vomiting. If soap and water are not available, use hand .  This information is not intended to replace advice given to you by your health care provider. Make sure you discuss any questions you have with your health care provider.  Document Revised: 06/05/2020 Document Reviewed: 10/23/2019  [employer] Patient Education © 2022 [employer]

## 2023-03-07 NOTE — PROGRESS NOTES
HPI  Viry Theodore is a 27 y.o. female  presents with complaint of symptoms starting around 2am this morning including N/V/D. Denies fever, chills, sweats, cough, congestion, runny nose, preg, breastfeeding. Has not taken any meds.     Review of Systems    Past Medical History:   Diagnosis Date   • Dysmenorrhea    • Migraine    • Raynaud's syndrome    • UTI (urinary tract infection)        Family History   Problem Relation Age of Onset   • Breast cancer Mother    • Endometriosis Maternal Grandmother    • Melanoma Maternal Grandmother    • Ovarian cancer Neg Hx    • Uterine cancer Neg Hx    • Colon cancer Neg Hx    • Pancreatic cancer Neg Hx    • Congenital heart disease Neg Hx    • Cystic fibrosis Neg Hx        Social History     Socioeconomic History   • Marital status:    Tobacco Use   • Smoking status: Never   • Smokeless tobacco: Never   Vaping Use   • Vaping Use: Never used   Substance and Sexual Activity   • Alcohol use: Not Currently     Alcohol/week: 3.0 standard drinks     Types: 3 Glasses of wine per week     Comment: I may have a few glasses of wine on the weekends or socially   • Drug use: No   • Sexual activity: Yes     Partners: Male     Birth control/protection: OCP     Comment:  Ady         There were no vitals taken for this visit.    PHYSICAL EXAM  Physical Exam   Constitutional: She appears well-developed and well-nourished.   HENT:   Head: Normocephalic.   Nose: Nose normal.   Neck: Neck normal appearance.  Pulmonary/Chest: Effort normal.   Neurological: She is alert.   Psychiatric: She has a normal mood and affect. Her speech is normal.       Diagnoses and all orders for this visit:    1. Gastroenteritis (Primary)  -     ondansetron ODT (ZOFRAN-ODT) 8 MG disintegrating tablet; Place 1 tablet on the tongue Every 8 (Eight) Hours As Needed for Nausea or Vomiting.  Dispense: 15 tablet; Refill: 0          FOLLOW-UP  As discussed during visit with [facility], if symptoms  worsen or fail to improve, follow-up with PCP/Urgent Care/Emergency Department.    Patient verbalizes understanding of medications, instructions for treatment and follow-up.    Ellen Chavez, APRN  03/07/2023  07:11 EST    The use of a video visit has been reviewed with the patient and verbal informed consent has been obtained. Myself and Viry Theodore participated in this visit. The patient is located in [place], [place], and I am located in [place], [place]. [employer] Video Client were utilized.

## 2023-05-08 ENCOUNTER — TELEPHONE (OUTPATIENT)
Dept: OBSTETRICS AND GYNECOLOGY | Age: 28
End: 2023-05-08
Payer: COMMERCIAL

## 2023-05-08 NOTE — TELEPHONE ENCOUNTER
Pt calling in regards to weight gain. Pt states she stopped breast feeding x 2 months ago and since stopping she has gained +35 lbs, Pt wanting to know what to do in regards to this. Patient states she eats a healthy balanced diet and is active.

## 2024-01-31 ENCOUNTER — LAB (OUTPATIENT)
Facility: HOSPITAL | Age: 29
End: 2024-01-31
Payer: COMMERCIAL

## 2024-01-31 ENCOUNTER — OFFICE VISIT (OUTPATIENT)
Dept: INTERNAL MEDICINE | Age: 29
End: 2024-01-31
Payer: COMMERCIAL

## 2024-01-31 VITALS
TEMPERATURE: 97.6 F | DIASTOLIC BLOOD PRESSURE: 80 MMHG | HEART RATE: 88 BPM | OXYGEN SATURATION: 97 % | BODY MASS INDEX: 31.76 KG/M2 | SYSTOLIC BLOOD PRESSURE: 124 MMHG | HEIGHT: 64 IN | WEIGHT: 186 LBS

## 2024-01-31 DIAGNOSIS — F41.9 ANXIETY AND DEPRESSION: ICD-10-CM

## 2024-01-31 DIAGNOSIS — Z00.00 ANNUAL PHYSICAL EXAM: ICD-10-CM

## 2024-01-31 DIAGNOSIS — R53.83 OTHER FATIGUE: ICD-10-CM

## 2024-01-31 DIAGNOSIS — F32.A ANXIETY AND DEPRESSION: ICD-10-CM

## 2024-01-31 DIAGNOSIS — Z76.89 ENCOUNTER TO ESTABLISH CARE: Primary | ICD-10-CM

## 2024-01-31 DIAGNOSIS — Z80.3 FAMILY HISTORY OF BREAST CANCER IN MOTHER: ICD-10-CM

## 2024-01-31 LAB
25(OH)D3 SERPL-MCNC: 32.5 NG/ML (ref 30–100)
ALBUMIN SERPL-MCNC: 4.6 G/DL (ref 3.5–5.2)
ALBUMIN/GLOB SERPL: 1.5 G/DL
ALP SERPL-CCNC: 91 U/L (ref 39–117)
ALT SERPL W P-5'-P-CCNC: 28 U/L (ref 1–33)
ANION GAP SERPL CALCULATED.3IONS-SCNC: 11.3 MMOL/L (ref 5–15)
AST SERPL-CCNC: 24 U/L (ref 1–32)
BACTERIA UR QL AUTO: NORMAL /HPF
BASOPHILS # BLD AUTO: 0.07 10*3/MM3 (ref 0–0.2)
BASOPHILS NFR BLD AUTO: 1 % (ref 0–1.5)
BILIRUB SERPL-MCNC: 0.4 MG/DL (ref 0–1.2)
BILIRUB UR QL STRIP: NEGATIVE
BUN SERPL-MCNC: 13 MG/DL (ref 6–20)
BUN/CREAT SERPL: 14.9 (ref 7–25)
CALCIUM SPEC-SCNC: 9.7 MG/DL (ref 8.6–10.5)
CHLORIDE SERPL-SCNC: 102 MMOL/L (ref 98–107)
CHOLEST SERPL-MCNC: 211 MG/DL (ref 0–200)
CLARITY UR: CLEAR
CO2 SERPL-SCNC: 25.7 MMOL/L (ref 22–29)
COLOR UR: YELLOW
CREAT SERPL-MCNC: 0.87 MG/DL (ref 0.57–1)
DEPRECATED RDW RBC AUTO: 44 FL (ref 37–54)
EGFRCR SERPLBLD CKD-EPI 2021: 94.4 ML/MIN/1.73
EOSINOPHIL # BLD AUTO: 0.17 10*3/MM3 (ref 0–0.4)
EOSINOPHIL NFR BLD AUTO: 2.5 % (ref 0.3–6.2)
ERYTHROCYTE [DISTWIDTH] IN BLOOD BY AUTOMATED COUNT: 12.3 % (ref 12.3–15.4)
GLOBULIN UR ELPH-MCNC: 3.1 GM/DL
GLUCOSE SERPL-MCNC: 96 MG/DL (ref 65–99)
GLUCOSE UR STRIP-MCNC: NEGATIVE MG/DL
HBA1C MFR BLD: 5.2 % (ref 4.8–5.6)
HCT VFR BLD AUTO: 44.2 % (ref 34–46.6)
HDLC SERPL QL: 3.52
HDLC SERPL-MCNC: 60 MG/DL (ref 40–60)
HGB BLD-MCNC: 15.1 G/DL (ref 12–15.9)
HGB UR QL STRIP.AUTO: NEGATIVE
HYALINE CASTS UR QL AUTO: NORMAL /LPF
IMM GRANULOCYTES # BLD AUTO: 0.01 10*3/MM3 (ref 0–0.05)
IMM GRANULOCYTES NFR BLD AUTO: 0.1 % (ref 0–0.5)
KETONES UR QL STRIP: NEGATIVE
LDLC SERPL CALC-MCNC: 138 MG/DL (ref 0–100)
LEUKOCYTE ESTERASE UR QL STRIP.AUTO: ABNORMAL
LYMPHOCYTES # BLD AUTO: 2.46 10*3/MM3 (ref 0.7–3.1)
LYMPHOCYTES NFR BLD AUTO: 36 % (ref 19.6–45.3)
MCH RBC QN AUTO: 33.2 PG (ref 26.6–33)
MCHC RBC AUTO-ENTMCNC: 34.2 G/DL (ref 31.5–35.7)
MCV RBC AUTO: 97.1 FL (ref 79–97)
MONOCYTES # BLD AUTO: 0.5 10*3/MM3 (ref 0.1–0.9)
MONOCYTES NFR BLD AUTO: 7.3 % (ref 5–12)
NEUTROPHILS NFR BLD AUTO: 3.62 10*3/MM3 (ref 1.7–7)
NEUTROPHILS NFR BLD AUTO: 53.1 % (ref 42.7–76)
NITRITE UR QL STRIP: NEGATIVE
NRBC BLD AUTO-RTO: 0 /100 WBC (ref 0–0.2)
PH UR STRIP.AUTO: 7 [PH] (ref 5–8)
PLATELET # BLD AUTO: 304 10*3/MM3 (ref 140–450)
PMV BLD AUTO: 11 FL (ref 6–12)
POTASSIUM SERPL-SCNC: 4.3 MMOL/L (ref 3.5–5.2)
PROT SERPL-MCNC: 7.7 G/DL (ref 6–8.5)
PROT UR QL STRIP: NEGATIVE
RBC # BLD AUTO: 4.55 10*6/MM3 (ref 3.77–5.28)
RBC # UR STRIP: NORMAL /HPF
REF LAB TEST METHOD: NORMAL
SODIUM SERPL-SCNC: 139 MMOL/L (ref 136–145)
SP GR UR STRIP: 1.02 (ref 1–1.03)
SQUAMOUS #/AREA URNS HPF: NORMAL /HPF
T4 FREE SERPL-MCNC: 1.32 NG/DL (ref 0.93–1.7)
TRIGL SERPL-MCNC: 74 MG/DL (ref 0–150)
TSH SERPL DL<=0.05 MIU/L-ACNC: 2.85 UIU/ML (ref 0.27–4.2)
UROBILINOGEN UR QL STRIP: ABNORMAL
VIT B12 BLD-MCNC: 638 PG/ML (ref 211–946)
VLDLC SERPL-MCNC: 13 MG/DL (ref 5–40)
WBC # UR STRIP: NORMAL /HPF
WBC NRBC COR # BLD AUTO: 6.83 10*3/MM3 (ref 3.4–10.8)

## 2024-01-31 PROCEDURE — 84439 ASSAY OF FREE THYROXINE: CPT

## 2024-01-31 PROCEDURE — 82607 VITAMIN B-12: CPT

## 2024-01-31 PROCEDURE — 83036 HEMOGLOBIN GLYCOSYLATED A1C: CPT

## 2024-01-31 PROCEDURE — 81001 URINALYSIS AUTO W/SCOPE: CPT

## 2024-01-31 PROCEDURE — 36415 COLL VENOUS BLD VENIPUNCTURE: CPT

## 2024-01-31 PROCEDURE — 80061 LIPID PANEL: CPT

## 2024-01-31 PROCEDURE — 82306 VITAMIN D 25 HYDROXY: CPT

## 2024-01-31 PROCEDURE — 80050 GENERAL HEALTH PANEL: CPT

## 2024-01-31 RX ORDER — CITALOPRAM 20 MG/1
20 TABLET ORAL DAILY
Qty: 30 TABLET | Refills: 1 | Status: SHIPPED | OUTPATIENT
Start: 2024-01-31

## 2024-01-31 NOTE — PROGRESS NOTES
"    I N T E R N A L  M E D I C I N E  Risa Durbinchristy, APRN      ENCOUNTER DATE:  01/31/2024    Viry Loredo Arben / 28 y.o. / female      CHIEF COMPLAINT     Establish Care and Annual Exam    Here to establish care.  Former patient of [facility].  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      Followed by OBGYN, Dr. Nelson.  Mom of 15 month old daughter.  Plans to schedule an appointment to update pap.    Mom with breast cancer in early 40s.  Mom had negative genetic testing.    She reports ongoing, progressive fatigue over the last several months.  Some difficulty with sleep onset due to worrying thoughts, but overall, sleeps well once she falls asleep.  +Snoring.  No prior sleep study.  Also with weight gain of 25 pounds since October 2022.  Follows healthy diet and exercises regularly.      Also with mild to moderate dysphoric mood and anxious, worrying thoughts for the last several months.  Reduced interest in activities she once enjoyed.  No prior diagnosis of depression/ anxiety but does have strong family history, including bipolar disorder.  She denies any hypomania, yuri symptoms.  Family members take Pristiq with benefit.  No SI/ HI.  Does not see counseling.        VITALS     Visit Vitals  /80   Pulse 88   Temp 97.6 °F (36.4 °C)   Ht 162.6 cm (64.02\")   Wt 84.4 kg (186 lb)   SpO2 97%   BMI 31.91 kg/m²       BP Readings from Last 3 Encounters:   01/31/24 124/80   10/10/22 124/78   09/01/22 120/70     Wt Readings from Last 3 Encounters:   01/31/24 84.4 kg (186 lb)   10/10/22 73.4 kg (161 lb 12.8 oz)   09/01/22 73.1 kg (161 lb 3.2 oz)      Body mass index is 31.91 kg/m².       MEDICATIONS     Current Outpatient Medications on File Prior to Visit   Medication Sig Dispense Refill    [DISCONTINUED] ondansetron ODT (ZOFRAN-ODT) 8 MG disintegrating tablet Place 1 tablet on the tongue Every 8 (Eight) Hours As Needed for Nausea or Vomiting. 15 tablet 0     No current facility-administered medications on " file prior to visit.         HISTORY OF PRESENT ILLNESS     Viry presents for annual health maintenance visit.    General health: fair  Lifestyle:  Attempting to lose weight?: Yes   Diet: eats moderately healthy  Exercise: exercises 3-5 days weekly  Tobacco: Never used   Alcohol: occasional/infrequent  Work: Full-time  Reproductive health:  Sexually active?: Yes   Sexual problems?: No problems  Concern for STD?: No    Sees Gynecologist?: Yes   Viviana/Postmenopausal?: No   Domestic abuse concerns: No   Depression Screenin/31/2024     7:31 AM   PHQ-2/PHQ-9 Depression Screening   Little Interest or Pleasure in Doing Things 2-->more than half the days   Feeling Down, Depressed or Hopeless 2-->more than half the days   PHQ-9: Brief Depression Severity Measure Score 4         PHQ-2: 3 (Proceed to PHQ-9)   PHQ-9: 10-14 (Moderate Depression)    Patient Care Team:  Risa Owen APRN as PCP - General (Family Medicine)  Courtney Graves PA as Physician Assistant (Obstetrics and Gynecology)  Saritha Nelson MD as Obstetrician (Obstetrics and Gynecology)      ALLERGIES  No Known Allergies     PFSH:     The following portions of the patient's history were reviewed and updated as appropriate: Allergies / Current Medications / Past Medical History / Surgical History / Social History / Family History    PROBLEM LIST   Patient Active Problem List   Diagnosis     (normal spontaneous vaginal delivery)       PAST MEDICAL HISTORY  Past Medical History:   Diagnosis Date    Dysmenorrhea     Migraine     Raynaud's syndrome     UTI (urinary tract infection)        SURGICAL HISTORY  Past Surgical History:   Procedure Laterality Date    KNEE ACL RECONSTRUCTION      WISDOM TOOTH EXTRACTION         SOCIAL HISTORY  Social History     Socioeconomic History    Marital status:    Tobacco Use    Smoking status: Never    Smokeless tobacco: Never   Vaping Use    Vaping Use: Never used   Substance and Sexual Activity     Alcohol use: Yes     Alcohol/week: 3.0 standard drinks of alcohol     Types: 3 Glasses of wine per week     Comment: I may have a few glasses of wine on the weekends or socially    Drug use: No    Sexual activity: Yes     Partners: Male     Birth control/protection: OCP     Comment:  Ady       FAMILY HISTORY  Family History   Problem Relation Age of Onset    Breast cancer Mother     Cancer Mother     Hyperlipidemia Mother     Alcohol abuse Father     Anxiety disorder Father     Depression Father     Anxiety disorder Sister     Depression Sister     Endometriosis Maternal Grandmother     Melanoma Maternal Grandmother     Cancer Maternal Grandmother     COPD Maternal Grandmother     Mental illness Maternal Grandmother     Thyroid disease Maternal Grandmother     Stroke Maternal Grandmother     Liver cancer Maternal Grandfather     Stroke Paternal Grandmother     Colon cancer Paternal Grandfather     Ovarian cancer Neg Hx     Uterine cancer Neg Hx     Pancreatic cancer Neg Hx     Congenital heart disease Neg Hx     Cystic fibrosis Neg Hx        IMMUNIZATION HISTORY  Immunization History   Administered Date(s) Administered    COVID-19 (PFIZER) Purple Cap Monovalent 12/17/2020, 01/07/2021    DTaP 1997, 1997, 1997, 02/02/1999, 08/17/2001    Fluzone (or Fluarix & Flulaval for VFC) >6mos 10/02/2019    HPV Quadrivalent 05/30/2008, 10/08/2008, 08/02/2011    Hep A, 2 Dose 05/30/2008, 08/02/2011    Hep B, Adolescent or Pediatric 1997, 1997    Hib (PRP-OMP) 1997, 1997, 1997, 02/02/1999    IPV 1997, 1997, 02/02/1999, 08/17/2001    Influenza LAIV (Nasal) 10/01/2009    MMR 02/02/1999, 08/17/2001    Meningococcal MCV4P (Menactra) 05/30/2008, 08/11/2015    PEDS-Pneumococcal Conjugate (PCV7) 06/07/2000    PPD Test 02/05/2015, 08/17/2015, 09/16/2020    Tdap 05/30/2008, 10/10/2018    Varicella 05/29/1998, 05/30/2008         REVIEW OF SYSTEMS     Review of Systems    Constitutional:  Positive for fatigue. Negative for chills, fever and unexpected weight change.   Respiratory:  Negative for cough, chest tightness and shortness of breath.    Cardiovascular:  Negative for chest pain, palpitations and leg swelling.   Neurological:  Negative for dizziness, weakness, light-headedness and headaches.   Psychiatric/Behavioral:  Positive for dysphoric mood and sleep disturbance. Negative for self-injury and suicidal ideas. The patient is nervous/anxious.          PHYSICAL EXAMINATION     Physical Exam  Vitals reviewed.   Constitutional:       General: She is not in acute distress.     Appearance: Normal appearance. She is not ill-appearing, toxic-appearing or diaphoretic.   HENT:      Head: Normocephalic and atraumatic.      Right Ear: Tympanic membrane, ear canal and external ear normal. There is no impacted cerumen.      Left Ear: Tympanic membrane, ear canal and external ear normal. There is no impacted cerumen.      Nose: Nose normal. No congestion or rhinorrhea.      Mouth/Throat:      Mouth: Mucous membranes are moist.      Pharynx: Oropharynx is clear. No oropharyngeal exudate or posterior oropharyngeal erythema.   Eyes:      Extraocular Movements: Extraocular movements intact.      Conjunctiva/sclera: Conjunctivae normal.      Pupils: Pupils are equal, round, and reactive to light.   Cardiovascular:      Rate and Rhythm: Normal rate and regular rhythm.      Heart sounds: Normal heart sounds.   Pulmonary:      Effort: Pulmonary effort is normal. No respiratory distress.      Breath sounds: Normal breath sounds.   Abdominal:      General: Bowel sounds are normal.      Palpations: Abdomen is soft.      Tenderness: There is no abdominal tenderness.   Musculoskeletal:         General: Normal range of motion.      Cervical back: Normal range of motion and neck supple.      Right lower leg: No edema.      Left lower leg: No edema.   Lymphadenopathy:      Cervical: No cervical  "adenopathy.   Skin:     General: Skin is warm and dry.   Neurological:      General: No focal deficit present.      Mental Status: She is alert and oriented to person, place, and time. Mental status is at baseline.   Psychiatric:         Mood and Affect: Mood normal.         Behavior: Behavior normal.         Thought Content: Thought content normal.         Judgment: Judgment normal.         REVIEWED DATA      Labs:    Lab Results   Component Value Date     08/08/2018    K 3.6 08/08/2018    CALCIUM 9.0 08/08/2018    AST 17 08/08/2018    ALT 20 08/08/2018    BUN 10 08/08/2018    CREATININE 0.84 08/08/2018    EGFRIFNONA 86 08/08/2018    EGFRIFAFRI 104 08/08/2018       Lab Results   Component Value Date    GLUCOSE 152 (H) 08/08/2018       No results found for: \"LDL\", \"HDL\", \"TRIG\", \"CHOLHDLRATIO\"    No results found for: \"CYHL54FY\"     Lab Results   Component Value Date    WBC 10.83 (H) 08/22/2022    HGB 12.6 08/22/2022    MCV 99.7 (H) 08/22/2022     08/22/2022       Lab Results   Component Value Date    GLUCOSEU Negative 08/08/2018    BLOODU Moderate (2+) (A) 08/08/2018    NITRITEU Negative 08/08/2018    LEUKOCYTESUR Trace (A) 08/08/2018          Lab Results   Component Value Date    HEPCVIRUSABY <0.1 12/28/2021       Imaging:        Medical Tests:        ASSESSMENT & PLAN     ANNUAL WELLNESS EXAM / PHYSICAL     Diagnoses and all orders for this visit:    1. Encounter to establish care (Primary)    2. Annual physical exam  -     Hemoglobin A1c  -     Lipid Panel With / Chol / HDL Ratio  -     Urinalysis With Microscopic If Indicated (No Culture) - Urine, Clean Catch    3. Other fatigue  -     CBC & Differential  -     Comprehensive Metabolic Panel  -     TSH+Free T4  -     Vitamin B12  -     Vitamin D,25-Hydroxy    4. Anxiety and depression  -     citalopram (CeleXA) 20 MG tablet; Take 1 tablet by mouth Daily.  Dispense: 30 tablet; Refill: 1    5. Family history of breast cancer in mother  -     " Ambulatory Referral to High Risk Breast ([facility], [facility])         Summary/Discussion:     Discussed risks, benefits, side effects of SSRI use with patient. Will initiate citalopram and closely re assess in 1 month.  She is aware to visit ER for any SI/ HI.  Encouraged patient to look into her work's EAP service for counseling.  She declines hydroxyzine PRN use at this time for anxiety.  Will rule out comorbid hypothyroidism, given fatigue, weight gain, anxiety with labs.  Consider sleep medicine referral for fatigue if labs unremarkable.  Given her family history of early breast CA in mom, she is agreeable for referral to high risk breast CA clinic.    BMI is >= 30 and <35. (Class 1 Obesity). The following options were offered after discussion;: exercise counseling/recommendations and nutrition counseling/recommendations      Next Appointment with me: Visit date not found    Return for 1 month recheck anxiety/ depression.      HEALTHCARE MAINTENANCE ISSUES     Cancer Screening:  Colon: Initial/Next screening at age: 45  Repeat colon cancer screening: N/A at this time  Breast: Recommended monthly self exams; annual professional exam  Mammogram: N/A at this time  Cervical: 2 years  Skin: Monthly self skin examination, annual exam by health professional  Lung: Does not meet criteria for lung cancer screening.   Other:    Screening Labs & Tests:  Lab results reviewed & discussed with the patient or test orders placed today.  EKG:  CV Screening: Lipid panel  DEXA (65+ or postmenopausal with risk factors):   HEP C (If born [birth year], or risk factors): Previously had negative screen  Other:     Immunization/Vaccinations (to be given today unless deferred by patient)  Influenza: Recommended annual influenza vaccine  Hepatitis A: Verify immunization records  Hepatitis B: Verify immunization records  Tetanus/Pertussis: Up to date  Pneumovax: Not needed at this time  Shingles: Not needed at this time  COVID: Considering the latest  booster     Lifestyle Counseling:  Lifestyle Modifications: Attempt to lose weight, Continue good lifestyle choices/modifications, Begin progressive aerobic exercise program 3-5 days a week, Make dinner the lightest meal of day, and Reduce exposure to stress if possible  Safety Issues: Always wear seatbelt, Avoid texting while driving   Use sunscreen, regular skin examination  Recommended annual dental/vision examination.  Emotional/Stress/Sleep: Reviewed and  given when appropriate      Health Maintenance   Topic Date Due    BMI FOLLOWUP  Never done    COVID-19 Vaccine (3 - 2023-24 season) 02/02/2024 (Originally 9/1/2023)    PAP SMEAR  03/24/2024 (Originally 4/26/2021)    INFLUENZA VACCINE  03/31/2024 (Originally 8/1/2023)    ANNUAL PHYSICAL  01/31/2025    TDAP/TD VACCINES (3 - Td or Tdap) 10/10/2028    HEPATITIS C SCREENING  Completed    HPV VACCINES  Completed    Pneumococcal Vaccine 0-64  Aged Out    CHLAMYDIA SCREENING  Discontinued

## 2024-02-05 ENCOUNTER — PATIENT ROUNDING (BHMG ONLY) (OUTPATIENT)
Dept: INTERNAL MEDICINE | Age: 29
End: 2024-02-05
Payer: COMMERCIAL

## 2024-02-05 NOTE — PROGRESS NOTES
A JUNIQE message has been sent to the patient for PATIENT ROUNDING with [facility].

## 2024-03-06 ENCOUNTER — TELEPHONE (OUTPATIENT)
Dept: OBSTETRICS AND GYNECOLOGY | Age: 29
End: 2024-03-06
Payer: COMMERCIAL

## 2024-03-06 DIAGNOSIS — O20.9 BLEEDING IN EARLY PREGNANCY: Primary | ICD-10-CM

## 2024-03-06 NOTE — TELEPHONE ENCOUNTER
"I'm happy to see the patient.  Schedule for US and appt in 2-4 wks.  On US appt note, write \"perform UPT first; no US if neg\".  "

## 2024-03-06 NOTE — TELEPHONE ENCOUNTER
Hub staff attempted to follow warm transfer process and was unsuccessful     Caller: Viry Theodore    Relationship to patient: Self    Best call back number: [phone]    Patient is needing: PT HAS 2 POSITIVE AT HOME PREGNANCY TEST, NEEDING TO SCHEDULE FOR A NEW OB APPT. PT STATED HER CYCLE IS VERY IRREGULAR, LMP: EARLY OCTOBER

## 2024-03-06 NOTE — TELEPHONE ENCOUNTER
Pt requesting a new OB appt, Last LMP 10/23 but neg preg test beginning of Jan, Pt states + test yesterday. Please advise if you will see pt and how soon

## 2024-03-06 NOTE — TELEPHONE ENCOUNTER
Pt calling back stating she went to restroom & noticed some light bleeding. Pt stating bleeding was bright red. Pt denies any abdominal cramping or bleeding enough to soak through a pad. Pt currently scheduled on 4/4/24 for pregnancy confirmation with Dr. Ambrose. Pt asking if she should be concerned & if she should be scheduled sooner or come in for labs. Please advise.

## 2024-03-07 ENCOUNTER — TELEMEDICINE (OUTPATIENT)
Dept: INTERNAL MEDICINE | Age: 29
End: 2024-03-07
Payer: COMMERCIAL

## 2024-03-07 DIAGNOSIS — F41.9 ANXIETY AND DEPRESSION: Primary | ICD-10-CM

## 2024-03-07 DIAGNOSIS — F32.A ANXIETY AND DEPRESSION: Primary | ICD-10-CM

## 2024-03-07 DIAGNOSIS — R05.8 ALLERGIC COUGH: ICD-10-CM

## 2024-03-07 PROCEDURE — 99214 OFFICE O/P EST MOD 30 MIN: CPT

## 2024-03-07 NOTE — PROGRESS NOTES
I N T E R N A L  M E D I C I N E  CYNDEE ARTEAGA, APRN      ENCOUNTER DATE:  03/07/2024    Viry Gertrude Arben / 28 y.o. / female        You have chosen to receive care through a telehealth visit.  Do you consent to use a video/audio connection for your medical care today? Yes    Location of patient and provider are in [place]       CHIEF COMPLAINT / REASON FOR OFFICE VISIT     TELEHEALTH ENCOUNTER:  anxiety and depression and Cough      ASSESSMENT & PLAN     Diagnoses and all orders for this visit:    1. Anxiety and depression (Primary)    2. Allergic cough          SUMMARY/DISCUSSION  She was started on citalopram 20 mg daily 1 month ago and is having benefit with depression/ anxiety.  However, recently learned she is pregnant.  Discussed risks, benefits, side effects of SSRI use during pregnancy.  She is unsure whether she would like to continue use at this time, but would like to discuss further with her GYN, and a personal friend who has OBGYN knowledge and psychiatry knowledge as well.  She will provide me with an update on her decision in 1 week.  We discussed potential plan for tapering use if needed.    Her cough symptoms are consistent with allergic response.  Discussed considering daily Zyrtec and/ or daily Flonase nasal spray.  She will let me know if symptoms do not improve.        Time spent: 15 minutes    Next Appointment with me: Visit date not found    Return for 6 month chronic care.        HISTORY OF PRESENT ILLNESS     Started on citalopram 20 mg daily beginning of February 2024 for mixed anxiety and depression.  She has been taking with benefit.  Reports reduced anxiety and improved interested in activities she used to enjoy.  No side effects, SI/ HI.  Found out that she is pregnant yesterday.  Has HCG Quant lab work pending with GYN office at this time.      Also with lingering non productive cough for the last couple weeks.  No sinus congestion/ pressure/ pain.  Some rhinorrhea, post  nasal drip.  No fever, chills.  History of allergic rhinitis.        REVIEWED DATA     Labs:           Imaging:           Medical Tests:           Summary of old records / correspondence / consultant report:           Request outside records:         Template created by Moses Ansari MD

## 2024-03-08 DIAGNOSIS — O20.9 BLEEDING IN EARLY PREGNANCY: Primary | ICD-10-CM

## 2024-03-13 ENCOUNTER — TELEPHONE (OUTPATIENT)
Dept: OBSTETRICS AND GYNECOLOGY | Age: 29
End: 2024-03-13
Payer: COMMERCIAL

## 2024-03-13 RX ORDER — PROMETHAZINE HYDROCHLORIDE 25 MG/1
25 TABLET ORAL EVERY 6 HOURS PRN
Qty: 30 TABLET | Refills: 2 | Status: SHIPPED | OUTPATIENT
Start: 2024-03-13

## 2024-03-13 NOTE — TELEPHONE ENCOUNTER
PT calling wanting to know if something can be sent in for morning sickness. Pt is taking Unisom and is trying Vitimin B6.

## 2024-03-13 NOTE — TELEPHONE ENCOUNTER
Absolutely.  I have sent in Rx for Phenergan.  Please let patient know that it can make her drowsy so she should avoid driving while taking it.  Make sure she is taking her Unisom every night at bedtime and B6 2-3 times daily.  She can also take Unisom 2-3 times daily if it does not make her too drowsy.

## 2024-03-14 ENCOUNTER — TELEPHONE (OUTPATIENT)
Dept: OBSTETRICS AND GYNECOLOGY | Age: 29
End: 2024-03-14
Payer: COMMERCIAL

## 2024-03-14 NOTE — TELEPHONE ENCOUNTER
LM notifying pt:    I have sent in Rx for Phenergan.  Please let patient know that it can make her drowsy so she should avoid driving while taking it.  Make sure she is taking her Unisom every night at bedtime and B6 2-3 times daily.  She can also take Unisom 2-3 times daily if it does not make her too drowsy.

## 2024-04-11 ENCOUNTER — OFFICE VISIT (OUTPATIENT)
Dept: OBSTETRICS AND GYNECOLOGY | Age: 29
End: 2024-04-11
Payer: COMMERCIAL

## 2024-04-11 VITALS
BODY MASS INDEX: 31.38 KG/M2 | WEIGHT: 183.8 LBS | HEIGHT: 64 IN | DIASTOLIC BLOOD PRESSURE: 62 MMHG | SYSTOLIC BLOOD PRESSURE: 104 MMHG

## 2024-04-11 DIAGNOSIS — F32.A ANXIETY AND DEPRESSION: ICD-10-CM

## 2024-04-11 DIAGNOSIS — Z32.00 ENCOUNTER FOR CONFIRMATION OF PREGNANCY TEST RESULT WITH PHYSICAL EXAMINATION: ICD-10-CM

## 2024-04-11 DIAGNOSIS — Z13.89 SCREENING FOR HEMATURIA OR PROTEINURIA: ICD-10-CM

## 2024-04-11 DIAGNOSIS — F41.9 ANXIETY AND DEPRESSION: ICD-10-CM

## 2024-04-11 DIAGNOSIS — Z3A.09 9 WEEKS GESTATION OF PREGNANCY: Primary | ICD-10-CM

## 2024-04-11 LAB
B-HCG UR QL: POSITIVE
CLARITY, POC: CLEAR
COLOR UR: YELLOW
EXPIRATION DATE: ABNORMAL
GLUCOSE UR STRIP-MCNC: NEGATIVE MG/DL
INTERNAL NEGATIVE CONTROL: ABNORMAL
INTERNAL POSITIVE CONTROL: ABNORMAL
Lab: ABNORMAL
PROT UR STRIP-MCNC: NEGATIVE MG/DL

## 2024-04-11 RX ORDER — PRENATAL VIT NO.126/IRON/FOLIC 28MG-0.8MG
TABLET ORAL DAILY
COMMUNITY

## 2024-04-11 NOTE — PROGRESS NOTES
[facility]   Obstetrics and Gynecology   New Obstetric Visit    2024    Patient: Viry Theodore          MR#:[id number]    History of Present Illness    Chief Complaint   Patient presents with    Follow-up     Pregnancy confirmation LMP 10/30/2023 but neg test in January, U/S today, No problems today       28 y.o. female  at 9w0d presents for NOB visit.  She is doing well today.  Taking prenatal vitamins.  Having some nausea without emesis.  Controlled by doxylamine nightly and Phenergan as needed.  Had some vaginal bleeding a few weeks ago but nothing further.    Studies reviewed:  US Ob Transvaginal (2024 13:40)   ________________________________________  Patient Active Problem List   Diagnosis    Anxiety and depression    9 weeks gestation of pregnancy     OB History          3    Para   1    Term   1            AB   1    Living   1         SAB        IAB        Ectopic        Molar        Multiple   0    Live Births   1                  Social History:  Denies h/o gonorrhea, chlamydia, herpes, syphilis, HIV  Denies family history of birth defects and genetic disorders    Past Medical History:   Diagnosis Date    Anxiety and depression 2024    Dysmenorrhea     Migraine     Raynaud's syndrome      Past Surgical History:   Procedure Laterality Date    KNEE ACL RECONSTRUCTION Right 2016    WISDOM TOOTH EXTRACTION  2013     Social History     Tobacco Use   Smoking Status Never    Passive exposure: Never   Smokeless Tobacco Never     Family History   Problem Relation Age of Onset    Alcohol abuse Father     Anxiety disorder Father     Depression Father     Breast cancer Mother 40    Cancer Mother     Hyperlipidemia Mother     Anxiety disorder Sister     Depression Sister     Liver cancer Paternal Grandfather     Stroke Paternal Grandmother     Endometriosis Maternal Grandmother     Melanoma Maternal Grandmother     Cancer Maternal Grandmother     COPD  "Maternal Grandmother     Mental illness Maternal Grandmother     Thyroid disease Maternal Grandmother     Stroke Maternal Grandmother     Liver cancer Maternal Grandfather     Ovarian cancer Neg Hx     Uterine cancer Neg Hx     Pancreatic cancer Neg Hx     Congenital heart disease Neg Hx     Cystic fibrosis Neg Hx     Colon cancer Neg Hx      Prior to Admission medications    Medication Sig Start Date End Date Taking? Authorizing Provider   prenatal vitamin (prenatal, CLASSIC, vitamin) tablet Take  by mouth Daily.   Yes ProviderSubhash MD   promethazine (PHENERGAN) 25 MG tablet Take 1 tablet by mouth Every 6 (Six) Hours As Needed for Nausea or Vomiting. 3/13/24  Yes Autumn Ambrose MD   citalopram (CeleXA) 20 MG tablet Take 1 tablet by mouth Daily.  Patient not taking: Reported on 4/11/2024 1/31/24 4/11/24  Risa Owen APRN     ________________________________________    The following portions of the patient's history were reviewed and updated as appropriate: allergies, current medications, past family history, past medical history, past social history, past surgical history, and problem list.    Review of Systems   All other systems reviewed and are negative.           Objective     /62   Ht 162.6 cm (64\")   Wt 83.4 kg (183 lb 12.8 oz)   LMP  (LMP Unknown)   Breastfeeding No   BMI 31.55 kg/m²    BP Readings from Last 3 Encounters:   04/11/24 104/62   01/31/24 124/80   10/10/22 124/78      Wt Readings from Last 3 Encounters:   04/11/24 83.4 kg (183 lb 12.8 oz)   01/31/24 84.4 kg (186 lb)   10/10/22 73.4 kg (161 lb 12.8 oz)        BMI: Estimated body mass index is 31.55 kg/m² as calculated from the following:    Height as of this encounter: 162.6 cm (64\").    Weight as of this encounter: 83.4 kg (183 lb 12.8 oz).    Physical Exam  Vitals and nursing note reviewed.   Constitutional:       General: She is not in acute distress.     Appearance: Normal appearance.   Pulmonary:      Effort: " Pulmonary effort is normal. No respiratory distress.   Abdominal:      General: There is no distension.      Palpations: Abdomen is soft.      Tenderness: There is no abdominal tenderness.   Neurological:      General: No focal deficit present.      Mental Status: She is alert and oriented to person, place, and time.   Psychiatric:         Mood and Affect: Mood normal.         Behavior: Behavior normal.         Thought Content: Thought content normal.         Judgment: Judgment normal.           Assessment:  Diagnoses and all orders for this visit:    1. 9 weeks gestation of pregnancy (Primary)  Overview:  -PNL: collect today  -Pap: NIL 4/2021, repeat postpartum  -Genetics: screening options reviewed, Mary Ellen pamphlet provided, carrier screen neg, desires cfDNA at next visit, plan for anatomy Us at 18-22 wga  -Imm: titers today, tdap > 27wga  -Contraception: discuss at future visit  -Birthplan: discuss at future visit  -Care coordination: accepts blood transfusions, no latex allergy, call schedule reviewed      Orders:  -     OB Panel With HIV  -     Varicella Zoster Antibody, IgG  -     Chlamydia trachomatis, Neisseria gonorrhoeae, Trichomonas vaginalis, PCR - Urine, Urine, Clean Catch    2. Encounter for confirmation of pregnancy test result with physical examination  -     POC Pregnancy, Urine  -     Urine Culture - Urine, Urine, Clean Catch  -     POC Urinalysis Dipstick    3. Screening for hematuria or proteinuria  -     Urine Culture - Urine, Urine, Clean Catch  -     POC Urinalysis Dipstick    4. Anxiety and depression  Overview:  -Started after she stopped breastfeeding last child.  Eventually started Celexa and took for a few months.  Improved anxiety for sure.  Stopped when she found out she was preg and feels okay.  Denies SI/HI.  Declines meds at this time.            Plan:  Return in about 4 weeks (around 5/9/2024) for Next f/u.      Autumn Ambrose MD  4/11/2024 14:40 EDT

## 2024-04-12 LAB
ABO GROUP BLD: ABNORMAL
BASOPHILS # BLD AUTO: 0.1 X10E3/UL (ref 0–0.2)
BASOPHILS NFR BLD AUTO: 1 %
BLD GP AB SCN SERPL QL: NEGATIVE
EOSINOPHIL # BLD AUTO: 0 X10E3/UL (ref 0–0.4)
EOSINOPHIL NFR BLD AUTO: 0 %
ERYTHROCYTE [DISTWIDTH] IN BLOOD BY AUTOMATED COUNT: 12.3 % (ref 11.7–15.4)
HBV SURFACE AG SERPL QL IA: NEGATIVE
HCT VFR BLD AUTO: 42.9 % (ref 34–46.6)
HCV IGG SERPL QL IA: NON REACTIVE
HGB BLD-MCNC: 14.4 G/DL (ref 11.1–15.9)
HIV 1+2 AB+HIV1 P24 AG SERPL QL IA: NON REACTIVE
IMM GRANULOCYTES # BLD AUTO: 0 X10E3/UL (ref 0–0.1)
IMM GRANULOCYTES NFR BLD AUTO: 0 %
LYMPHOCYTES # BLD AUTO: 2.2 X10E3/UL (ref 0.7–3.1)
LYMPHOCYTES NFR BLD AUTO: 22 %
MCH RBC QN AUTO: 32.7 PG (ref 26.6–33)
MCHC RBC AUTO-ENTMCNC: 33.6 G/DL (ref 31.5–35.7)
MCV RBC AUTO: 98 FL (ref 79–97)
MONOCYTES # BLD AUTO: 0.6 X10E3/UL (ref 0.1–0.9)
MONOCYTES NFR BLD AUTO: 6 %
NEUTROPHILS # BLD AUTO: 6.9 X10E3/UL (ref 1.4–7)
NEUTROPHILS NFR BLD AUTO: 71 %
PLATELET # BLD AUTO: 298 X10E3/UL (ref 150–450)
RBC # BLD AUTO: 4.4 X10E6/UL (ref 3.77–5.28)
RH BLD: POSITIVE
RPR SER QL: NON REACTIVE
RUBV IGG SERPL IA-ACNC: 4.74 INDEX
VZV IGG SER IA-ACNC: 730 INDEX
WBC # BLD AUTO: 9.8 X10E3/UL (ref 3.4–10.8)

## 2024-04-12 NOTE — PROGRESS NOTES
Please call patient to let her know her OB panel was all normal.    Thanks!  Autumn Ambrose MD  04/12/24 11:45 EDT

## 2024-04-13 LAB
BACTERIA UR CULT: NO GROWTH
BACTERIA UR CULT: NORMAL
C TRACH RRNA SPEC QL NAA+PROBE: NEGATIVE
N GONORRHOEA RRNA SPEC QL NAA+PROBE: NEGATIVE
T VAGINALIS RRNA SPEC QL NAA+PROBE: NEGATIVE

## 2024-05-09 ENCOUNTER — INITIAL PRENATAL (OUTPATIENT)
Dept: OBSTETRICS AND GYNECOLOGY | Age: 29
End: 2024-05-09
Payer: COMMERCIAL

## 2024-05-09 VITALS — WEIGHT: 183.2 LBS | DIASTOLIC BLOOD PRESSURE: 60 MMHG | SYSTOLIC BLOOD PRESSURE: 104 MMHG | BODY MASS INDEX: 31.45 KG/M2

## 2024-05-09 DIAGNOSIS — F41.9 ANXIETY AND DEPRESSION: ICD-10-CM

## 2024-05-09 DIAGNOSIS — Z13.89 SCREENING FOR HEMATURIA OR PROTEINURIA: ICD-10-CM

## 2024-05-09 DIAGNOSIS — F32.A ANXIETY AND DEPRESSION: ICD-10-CM

## 2024-05-09 DIAGNOSIS — Z3A.13 13 WEEKS GESTATION OF PREGNANCY: Primary | ICD-10-CM

## 2024-05-09 LAB
CLARITY, POC: CLEAR
COLOR UR: YELLOW
GLUCOSE UR STRIP-MCNC: NEGATIVE MG/DL
PROT UR STRIP-MCNC: NEGATIVE MG/DL

## 2024-05-09 RX ORDER — ONDANSETRON 4 MG/1
4 TABLET, FILM COATED ORAL EVERY 4 HOURS PRN
Qty: 30 TABLET | Refills: 1 | Status: SHIPPED | OUTPATIENT
Start: 2024-05-09 | End: 2025-05-09

## 2024-06-13 ENCOUNTER — ROUTINE PRENATAL (OUTPATIENT)
Dept: OBSTETRICS AND GYNECOLOGY | Age: 29
End: 2024-06-13
Payer: COMMERCIAL

## 2024-06-13 VITALS — SYSTOLIC BLOOD PRESSURE: 104 MMHG | BODY MASS INDEX: 32.17 KG/M2 | WEIGHT: 187.4 LBS | DIASTOLIC BLOOD PRESSURE: 62 MMHG

## 2024-06-13 DIAGNOSIS — F32.A ANXIETY AND DEPRESSION: ICD-10-CM

## 2024-06-13 DIAGNOSIS — Z3A.18 18 WEEKS GESTATION OF PREGNANCY: Primary | ICD-10-CM

## 2024-06-13 DIAGNOSIS — F41.9 ANXIETY AND DEPRESSION: ICD-10-CM

## 2024-06-13 DIAGNOSIS — Z13.89 SCREENING FOR HEMATURIA OR PROTEINURIA: ICD-10-CM

## 2024-06-13 NOTE — PROGRESS NOTES
Viry Theodore, a 29 y.o.  at 18w0d, presents for OB follow-up.  She is doing well today.  Denies VB and pain.  Reports early fetal movements.    Objective  BP Readings from Last 3 Encounters:   24 104/62   24 104/60   24 104/62      Wt Readings from Last 3 Encounters:   24 85 kg (187 lb 6.4 oz)   24 83.1 kg (183 lb 3.2 oz)   24 83.4 kg (183 lb 12.8 oz)      Total weight gain this pregnancy: 1.996 kg (4 lb 6.4 oz)    General: Awake, alert, no apparent distress  Respiratory: No increased work of breathing  Abdomen: Fundus soft and nontender  Extremity: Nontender, no edema    A/P  Diagnoses and all orders for this visit:    1. 18 weeks gestation of pregnancy (Primary)  Overview:  -PNL: plan for 1hr GTT/CBC/RPR at 24-28wga  -Pap: NIL 2021, repeat postpartum  -Genetics: carrier screen/cFDNA neg, msAFP today, anatomy US normal but incomplete - repeat at next visit  -Imm: RI, VI, tdap > 27wga  -Contraception: discuss at future visit  -Birthplan: discuss at future visit  -Care coordination: accepts blood transfusions, no latex allergy, call schedule reviewed      Orders:  -     POC Urinalysis Dipstick  -     Alpha Fetoprotein, Maternal    2. Screening for hematuria or proteinuria  -     POC Urinalysis Dipstick    3. Anxiety and depression  Overview:  -Started after she stopped breastfeeding last child.  Eventually started Celexa and took for a few months.  Improved anxiety for sure.  Stopped when she found out she was preg and feels okay.  Denies SI/HI.  Declines meds at this time.          SAB precautions reviewed  Return in about 4 weeks (around 2024) for Next f/u with anatomy Us.    Autumn Ambrose MD

## 2024-06-15 LAB
AFP INTERP SERPL-IMP: NORMAL
AFP INTERP SERPL-IMP: NORMAL
AFP MOM SERPL: 0.96
AFP SERPL-MCNC: 37.2 NG/ML
AGE AT DELIVERY: 27.4 YR
GA METHOD: NORMAL
GA: 18 WEEKS
IDDM PATIENT QL: NO
LABORATORY COMMENT REPORT: NORMAL
MULTIPLE PREGNANCY: NO
NEURAL TUBE DEFECT RISK FETUS: NORMAL %
RESULT: NORMAL

## 2024-06-16 NOTE — PROGRESS NOTES
Please call patient to let her know that her screen for neural tube defects was negative.Thank you,  Autumn Ambrose MD  06/16/24  12:17 EDT

## 2024-06-19 ENCOUNTER — OFFICE VISIT (OUTPATIENT)
Dept: MAMMOGRAPHY | Facility: CLINIC | Age: 29
End: 2024-06-19
Payer: COMMERCIAL

## 2024-06-19 VITALS
HEIGHT: 63 IN | WEIGHT: 185 LBS | BODY MASS INDEX: 32.78 KG/M2 | HEART RATE: 95 BPM | DIASTOLIC BLOOD PRESSURE: 85 MMHG | OXYGEN SATURATION: 98 % | SYSTOLIC BLOOD PRESSURE: 127 MMHG

## 2024-06-19 DIAGNOSIS — Z91.89 AT HIGH RISK FOR BREAST CANCER: Primary | ICD-10-CM

## 2024-06-19 PROCEDURE — 99204 OFFICE O/P NEW MOD 45 MIN: CPT | Performed by: SURGERY

## 2024-06-19 NOTE — PROGRESS NOTES
Chief Complaint: Viry Theodore is a 29 y.o.. female here today for Consult        History of Present Illness:  Patient presents with management of breast cancer risk.   She is a nice 29-year-old white female who is currently 19 weeks pregnant with her second daughter.  She also has a 10-year-old stepson.  The patient has a family history of breast cancer and that her mother was diagnosed at the age of 40.  Her maternal grandmother had melanoma and maternal grandfather had some form of liver cancer.  Her mother was genetically tested about 8 years ago and was negative for any pathologic mutations.    The patient has not had any imaging up to this point.  She did have an ultrasound a number of years ago for a palpable abnormality which was negative.    Review of Systems:  Review of Systems   Skin:         The patient denies any noticeable changes to the skin of the breast.   All other systems reviewed and are negative.     I have reviewed the ROS as documented by the MA/LPN/RN Delfino Reyes MD      Past Medical and Surgical History:  Breast Biopsy History:  Patient has not had a breast biopsy in the past.  Breast Cancer HIstory:  Patient does not have a past medical history of breast cancer.  Breast Operations, and year:  0  Social History     Tobacco Use   Smoking Status Never    Passive exposure: Never   Smokeless Tobacco Never     Obstetric History:  Patient is premenopausal, first day of last period:   Number of pregnancies:3  Number of live births: 1  Number of abortions or miscarriages: 2  Age of delivery of first child: 27  Patient breast fed, for the following lenth of time: 6 months  Length of time taking birth control pills: 6 years  Patient has never taken hormone replacement    Past Surgical History:   Procedure Laterality Date    KNEE ACL RECONSTRUCTION Right 2016    WISDOM TOOTH EXTRACTION  2013       Past Medical History:   Diagnosis Date    Anxiety and depression 03/07/2024     Dysmenorrhea     Migraine     Raynaud's syndrome        Prior Hospitalizations, other than for surgery or childbirth, and year:  None    Social History:  Patient is .  Patient has 1 daughters.    Family History:  Family History   Problem Relation Age of Onset    Alcohol abuse Father     Anxiety disorder Father     Depression Father     Breast cancer Mother 40    Cancer Mother     Hyperlipidemia Mother     Anxiety disorder Sister     Depression Sister     Liver cancer Paternal Grandfather     Stroke Paternal Grandmother     Endometriosis Maternal Grandmother     Melanoma Maternal Grandmother     Cancer Maternal Grandmother     COPD Maternal Grandmother     Mental illness Maternal Grandmother     Thyroid disease Maternal Grandmother     Stroke Maternal Grandmother     Liver cancer Maternal Grandfather     Ovarian cancer Neg Hx     Uterine cancer Neg Hx     Pancreatic cancer Neg Hx     Congenital heart disease Neg Hx     Cystic fibrosis Neg Hx     Colon cancer Neg Hx        Vital Signs:  Vitals:    06/19/24 0809   BP: 127/85   Pulse: 95   SpO2: 98%       Medications:    Current Outpatient Prescriptions:     Current Outpatient Medications:     ondansetron (Zofran) 4 MG tablet, Take 1 tablet by mouth Every 4 (Four) Hours As Needed for Nausea or Vomiting., Disp: 30 tablet, Rfl: 1    prenatal vitamin (prenatal, CLASSIC, vitamin) tablet, Take  by mouth Daily., Disp: , Rfl:     promethazine (PHENERGAN) 25 MG tablet, Take 1 tablet by mouth Every 6 (Six) Hours As Needed for Nausea or Vomiting. (Patient not taking: Reported on 5/9/2024), Disp: 30 tablet, Rfl: 2    Physical Examination:  General Appearance:   Patient is in no distress.  She is well kept and has a BMI of 32.8.  Psychiatric:  Patient with appropriate mood and affect. Alert and oriented to self, time, and place.    Breast, RIGHT:  large sized, symmetric with the contralateral side.  Breast skin is without erythema, edema, rashes.  There are no visible  abnormalities upon inspection during the arm-raising maneuver or with hands on hips in the sitting position. There is no nipple retraction, discharge or nipple/areolar skin changes.There are no masses palpable in the sitting or supine positions.  Her breast tissue is appropriately dense for her age mainly across the upper part of the breast.    Breast, LEFT:  large sized, symmetric with the contralateral side.  Breast skin is without erythema, edema, rashes.  There are no visible abnormalities upon inspection during the arm-raising maneuver or with hands on hips in the sitting position. There is no nipple retraction, discharge or nipple/areolar skin changes.There are no masses palpable in the sitting or supine positions.  She has significant breast density across the upper part of the breast.    Lymphatic:  There is no axillary, cervical, infraclavicular, or supraclavicular adenopathy bilaterally.    Gastrointestinal:  Abdomen is soft, nondistended, and nontender.  There is no obvious hepatosplenomegaly.  The top of the uterus is palpable just below the umbilicus.  There are no scars from previous surgery.    Musculoskeletal:  Good strength in all 4 extremities.   There is good range of motion in both shoulders.        Assessment:  1. At high risk for breast cancer    She is aware that high risk breast cancer patients are typically identified because of a strong family history for breast cancer, a genetic mutation, LCIS, atypical hyperplasia, or history of radiation to the chest wall under the age of 30.  Our risk assessment models of estimated her lifetime risk at 22.8%.  Her 5-year risk was not calculated because of her age.  Based on these findings, the patient could consider supplemental MRI imaging when her other imaging is instituted.  She would also benefit from twice yearly clinical breast examination.  The patient is pregnant and will not be getting any imaging for a while anyway.  With her mother having  breast cancer at the age of 30 we might suggest beginning imaging 10 years prior to that which would be around the age of 32.  The patient is comfortable with that plan.  I did give her the option of yearly physical examination in the office until we begin imaging but I am not sure its entirely necessary particularly since she will be pregnant for good portion of that.  If Dr. Ambrose is uncomfortable with her physical examination, I am happy to be involved.    We also discussed the benefits of exercise, maintaining ideal body weight, and limiting alcohol intake.      Plan:  1.  Our current plan is to begin imaging at the age of 32.  We would likely perform MRI and mammogram beginning at that point.  2.  I plan to see her back somewhere around the age of 31 so that we can initiate the above-mentioned plans.  3.  If Dr. Ambrose is uncomfortable with doing the only breast exam in this high risk patient, I would be happy to see her yearly as well.  4.  The patient knows to call me if she palpates anything of concern.      CPT coding:    Next Appointment:  No follow-ups on file.            EMR Dragon/transcription disclaimer:    Much of this encounter note is an electronic transcription/translocation of spoken language to printed text.  The electronic translation of spoken language may permit erroneous, or at times, nonsensical words or phrases to be inadvertently transcribed.  Although I have reviewed the note from such areas, some may still exist.

## 2024-06-19 NOTE — LETTER
June 19, 2024     MEME You  [address]  [address]  [place] [address]    Patient: Viry Theodore   YOB: 1995   Date of Visit: 6/19/2024     Dear MEME You:       Thank you for referring Viry Theodore to me for evaluation. Below are the relevant portions of my assessment and plan of care.    Assessment:  1. At high risk for breast cancer    She is aware that high risk breast cancer patients are typically identified because of a strong family history for breast cancer, a genetic mutation, LCIS, atypical hyperplasia, or history of radiation to the chest wall under the age of 30.  Our risk assessment models of estimated her lifetime risk at 22.8%.  Her 5-year risk was not calculated because of her age.  Based on these findings, the patient could consider supplemental MRI imaging when her other imaging is instituted.  She would also benefit from twice yearly clinical breast examination.  The patient is pregnant and will not be getting any imaging for a while anyway.  With her mother having breast cancer at the age of 30 we might suggest beginning imaging 10 years prior to that which would be around the age of 32.  The patient is comfortable with that plan.  I did give her the option of yearly physical examination in the office until we begin imaging but I am not sure its entirely necessary particularly since she will be pregnant for good portion of that.  If Dr. Ambrose is uncomfortable with her physical examination, I am happy to be involved.    We also discussed the benefits of exercise, maintaining ideal body weight, and limiting alcohol intake.      Plan:  1.  Our current plan is to begin imaging at the age of 32.  We would likely perform MRI and mammogram beginning at that point.  2.  I plan to see her back somewhere around the age of 31 so that we can initiate the above-mentioned plans.  3.  If Dr. Ambrose is uncomfortable with doing the only breast exam in this  high risk patient, I would be happy to see her yearly as well.  4.  The patient knows to call me if she palpates anything of concern.    If you have questions, please do not hesitate to call me. I look forward to following Viry along with you.         Sincerely,        Delfino Reyes MD        CC: MD Eric Chavis, Delfino HADDAD MD  06/19/24 0922  Sign when Signing Visit  Chief Complaint: Viry Theodore is a 29 y.o.. female here today for Consult        History of Present Illness:  Patient presents with management of breast cancer risk.   She is a nice 29-year-old white female who is currently 19 weeks pregnant with her second daughter.  She also has a 10-year-old stepson.  The patient has a family history of breast cancer and that her mother was diagnosed at the age of 40.  Her maternal grandmother had melanoma and maternal grandfather had some form of liver cancer.  Her mother was genetically tested about 8 years ago and was negative for any pathologic mutations.    The patient has not had any imaging up to this point.  She did have an ultrasound a number of years ago for a palpable abnormality which was negative.    Review of Systems:  Review of Systems   Skin:         The patient denies any noticeable changes to the skin of the breast.   All other systems reviewed and are negative.     I have reviewed the ROS as documented by the MA/LPN/RN Delfino Reyes MD      Past Medical and Surgical History:  Breast Biopsy History:  Patient has not had a breast biopsy in the past.  Breast Cancer HIstory:  Patient does not have a past medical history of breast cancer.  Breast Operations, and year:  0  Social History     Tobacco Use   Smoking Status Never   • Passive exposure: Never   Smokeless Tobacco Never     Obstetric History:  Patient is premenopausal, first day of last period:   Number of pregnancies:3  Number of live births: 1  Number of abortions or miscarriages: 2  Age of  delivery of first child: 27  Patient breast fed, for the following lenth of time: 6 months  Length of time taking birth control pills: 6 years  Patient has never taken hormone replacement    Past Surgical History:   Procedure Laterality Date   • KNEE ACL RECONSTRUCTION Right 2016   • WISDOM TOOTH EXTRACTION  2013       Past Medical History:   Diagnosis Date   • Anxiety and depression 03/07/2024   • Dysmenorrhea    • Migraine    • Raynaud's syndrome        Prior Hospitalizations, other than for surgery or childbirth, and year:  None    Social History:  Patient is .  Patient has 1 daughters.    Family History:  Family History   Problem Relation Age of Onset   • Alcohol abuse Father    • Anxiety disorder Father    • Depression Father    • Breast cancer Mother 40   • Cancer Mother    • Hyperlipidemia Mother    • Anxiety disorder Sister    • Depression Sister    • Liver cancer Paternal Grandfather    • Stroke Paternal Grandmother    • Endometriosis Maternal Grandmother    • Melanoma Maternal Grandmother    • Cancer Maternal Grandmother    • COPD Maternal Grandmother    • Mental illness Maternal Grandmother    • Thyroid disease Maternal Grandmother    • Stroke Maternal Grandmother    • Liver cancer Maternal Grandfather    • Ovarian cancer Neg Hx    • Uterine cancer Neg Hx    • Pancreatic cancer Neg Hx    • Congenital heart disease Neg Hx    • Cystic fibrosis Neg Hx    • Colon cancer Neg Hx        Vital Signs:  Vitals:    06/19/24 0809   BP: 127/85   Pulse: 95   SpO2: 98%       Medications:    Current Outpatient Prescriptions:     Current Outpatient Medications:   •  ondansetron (Zofran) 4 MG tablet, Take 1 tablet by mouth Every 4 (Four) Hours As Needed for Nausea or Vomiting., Disp: 30 tablet, Rfl: 1  •  prenatal vitamin (prenatal, CLASSIC, vitamin) tablet, Take  by mouth Daily., Disp: , Rfl:   •  promethazine (PHENERGAN) 25 MG tablet, Take 1 tablet by mouth Every 6 (Six) Hours As Needed for Nausea or Vomiting.  (Patient not taking: Reported on 5/9/2024), Disp: 30 tablet, Rfl: 2    Physical Examination:  General Appearance:   Patient is in no distress.  She is well kept and has a BMI of 32.8.  Psychiatric:  Patient with appropriate mood and affect. Alert and oriented to self, time, and place.    Breast, RIGHT:  large sized, symmetric with the contralateral side.  Breast skin is without erythema, edema, rashes.  There are no visible abnormalities upon inspection during the arm-raising maneuver or with hands on hips in the sitting position. There is no nipple retraction, discharge or nipple/areolar skin changes.There are no masses palpable in the sitting or supine positions.  Her breast tissue is appropriately dense for her age mainly across the upper part of the breast.    Breast, LEFT:  large sized, symmetric with the contralateral side.  Breast skin is without erythema, edema, rashes.  There are no visible abnormalities upon inspection during the arm-raising maneuver or with hands on hips in the sitting position. There is no nipple retraction, discharge or nipple/areolar skin changes.There are no masses palpable in the sitting or supine positions.  She has significant breast density across the upper part of the breast.    Lymphatic:  There is no axillary, cervical, infraclavicular, or supraclavicular adenopathy bilaterally.    Gastrointestinal:  Abdomen is soft, nondistended, and nontender.  There is no obvious hepatosplenomegaly.  The top of the uterus is palpable just below the umbilicus.  There are no scars from previous surgery.    Musculoskeletal:  Good strength in all 4 extremities.   There is good range of motion in both shoulders.        Assessment:  1. At high risk for breast cancer    She is aware that high risk breast cancer patients are typically identified because of a strong family history for breast cancer, a genetic mutation, LCIS, atypical hyperplasia, or history of radiation to the chest wall under the  age of 30.  Our risk assessment models of estimated her lifetime risk at 22.8%.  Her 5-year risk was not calculated because of her age.  Based on these findings, the patient could consider supplemental MRI imaging when her other imaging is instituted.  She would also benefit from twice yearly clinical breast examination.  The patient is pregnant and will not be getting any imaging for a while anyway.  With her mother having breast cancer at the age of 30 we might suggest beginning imaging 10 years prior to that which would be around the age of 32.  The patient is comfortable with that plan.  I did give her the option of yearly physical examination in the office until we begin imaging but I am not sure its entirely necessary particularly since she will be pregnant for good portion of that.  If Dr. Ambrose is uncomfortable with her physical examination, I am happy to be involved.    We also discussed the benefits of exercise, maintaining ideal body weight, and limiting alcohol intake.      Plan:  1.  Our current plan is to begin imaging at the age of 32.  We would likely perform MRI and mammogram beginning at that point.  2.  I plan to see her back somewhere around the age of 31 so that we can initiate the above-mentioned plans.  3.  If Dr. Ambrose is uncomfortable with doing the only breast exam in this high risk patient, I would be happy to see her yearly as well.  4.  The patient knows to call me if she palpates anything of concern.      CPT coding:    Next Appointment:  No follow-ups on file.            EMR Dragon/transcription disclaimer:    Much of this encounter note is an electronic transcription/translocation of spoken language to printed text.  The electronic translation of spoken language may permit erroneous, or at times, nonsensical words or phrases to be inadvertently transcribed.  Although I have reviewed the note from such areas, some may still exist.

## 2024-06-20 PROBLEM — Z91.89 AT HIGH RISK FOR BREAST CANCER: Status: ACTIVE | Noted: 2024-06-20

## 2024-06-21 ENCOUNTER — PATIENT ROUNDING (BHMG ONLY) (OUTPATIENT)
Dept: MAMMOGRAPHY | Facility: CLINIC | Age: 29
End: 2024-06-21
Payer: COMMERCIAL

## 2024-06-21 NOTE — PROGRESS NOTES
June 21, 2024    Hello, may I speak with Viry Theodore?    My name is John      I am  with [facility]  [address] [address] [phone].    Before we get started may I verify your date of birth? 1995    I am calling to officially welcome you to our practice and ask about your recent visit. Is this a good time to talk? No, No answer MB    Tell me about your visit with us. What things went well?         We're always looking for ways to make our patients' experiences even better. Do you have recommendations on ways we may improve?      Overall were you satisfied with your first visit to our practice?        I appreciate you taking the time to speak with me today. Is there anything else I can do for you?       Thank you, and have a great day.

## 2024-07-10 ENCOUNTER — ROUTINE PRENATAL (OUTPATIENT)
Dept: OBSTETRICS AND GYNECOLOGY | Age: 29
End: 2024-07-10
Payer: COMMERCIAL

## 2024-07-10 VITALS — SYSTOLIC BLOOD PRESSURE: 100 MMHG | WEIGHT: 185 LBS | DIASTOLIC BLOOD PRESSURE: 62 MMHG | BODY MASS INDEX: 32.77 KG/M2

## 2024-07-10 DIAGNOSIS — Z13.89 SCREENING FOR BLOOD OR PROTEIN IN URINE: ICD-10-CM

## 2024-07-10 DIAGNOSIS — Z3A.21 21 WEEKS GESTATION OF PREGNANCY: Primary | ICD-10-CM

## 2024-07-10 NOTE — PROGRESS NOTES
Chief Complaint   Patient presents with    Routine Prenatal Visit     Ob check, 21w6d, anatomy scan today, no complaints       HPI: Viry Theodore, a 29 y.o.  at 21w6d, presents for OB follow-up.  She is doing well today.  Denies loss of fluid, vaginal bleeding, and contractions.    Has a work trip - flight to [place]    Relevant data reviewed:    Objective  BP Readings from Last 3 Encounters:   07/10/24 100/62   24 127/85   24 104/62      Wt Readings from Last 3 Encounters:   07/10/24 83.9 kg (185 lb)   24 83.9 kg (185 lb)   24 85 kg (187 lb 6.4 oz)      Total weight gain this pregnancy: 0.907 kg (2 lb)    General: Awake, alert, no apparent distress  Respiratory: No increased work of breathing  Abdomen: Fundus soft and nontender  Extremity: Nontender, no edema      Last OB US growth (since 2024)         Value Time User    EFW%ILE  67.6%ile 7/10/2024  2:27 PM Jonna Flores APRN    AC%ILE  79.8%ile 7/10/2024  2:27 PM Jonna Flores APRN    FETAL SURVEY  NL/Complete 7/10/2024  2:27 PM Jonna Flores APRN          Vitals:    07/10/24 1408   BP: 100/62   Weight: 83.9 kg (185 lb)     Total weight gain for pregnancy:  0.907 kg (2 lb)    Review of systems:   Gen: negative  CV:     negative  GI: negative  :   negative  MS:    negative  Neuro: negative  Pul: negative    Physical Exam  Constitutional:       General: She is not in acute distress.  Pulmonary:      Effort: Pulmonary effort is normal.   Abdominal:      Palpations: Abdomen is soft.      Tenderness: There is no abdominal tenderness.   Skin:     General: Skin is warm.   Neurological:      Mental Status: She is alert.   Psychiatric:         Mood and Affect: Mood normal.         Thought Content: Thought content normal.         Judgment: Judgment normal.         A/P  1. Intrauterine pregnancy at 21w6d       Diagnoses and all orders for this visit:    1. 21 weeks gestation of pregnancy  (Primary)  Overview:  -PNL: plan for 1hr GTT/CBC/RPR at 24-28wga  -Pap: NIL 2021, repeat postpartum  -Genetics: carrier screen/cFDNA neg, msAFP today, anatomy US normal but incomplete - repeat at next visit  -Imm: RI, VI, tdap > 27wga  -Contraception: discuss at future visit  -Birthplan: discuss at future visit  -Care coordination: accepts blood transfusions, no latex allergy, call schedule reviewed        2. Screening for blood or protein in urine  -     POC Urinalysis Dipstick      US ob follow up transabdominal approach (07/10/2024 14:04)          labor was discussed.  Warnings were provided.  Nutrition and weight gain were addressed.  -----------------------  PLAN: Anatomy is complete today  discussed work trip should be fine will be around 32-33 weeks  wear compression socks and move around   PTB precautions  Plan for 1hr at next appt she is aware   Return in about 4 weeks (around 2024).    Jonna Flores, APRN  7/10/2024 14:34 EDT

## 2024-08-07 ENCOUNTER — ROUTINE PRENATAL (OUTPATIENT)
Dept: OBSTETRICS AND GYNECOLOGY | Age: 29
End: 2024-08-07
Payer: COMMERCIAL

## 2024-08-07 VITALS — WEIGHT: 191.8 LBS | BODY MASS INDEX: 33.98 KG/M2 | SYSTOLIC BLOOD PRESSURE: 106 MMHG | DIASTOLIC BLOOD PRESSURE: 62 MMHG

## 2024-08-07 DIAGNOSIS — Z3A.25 25 WEEKS GESTATION OF PREGNANCY: Primary | ICD-10-CM

## 2024-08-07 DIAGNOSIS — Z13.1 SCREENING FOR DIABETES MELLITUS (DM): ICD-10-CM

## 2024-08-07 DIAGNOSIS — F32.A ANXIETY AND DEPRESSION: ICD-10-CM

## 2024-08-07 DIAGNOSIS — F41.9 ANXIETY AND DEPRESSION: ICD-10-CM

## 2024-08-07 DIAGNOSIS — Z13.0 SCREENING FOR IRON DEFICIENCY ANEMIA: ICD-10-CM

## 2024-08-07 DIAGNOSIS — Z13.89 SCREENING FOR BLOOD OR PROTEIN IN URINE: ICD-10-CM

## 2024-08-07 NOTE — PROGRESS NOTES
Viry Theodore, a 29 y.o.  at 25w6d, presents for OB follow-up.  She is doing well today.  Denies loss of fluid, vaginal bleeding, and contractions.  She has had some [place].  Endorses fetal movements.    Objective  BP Readings from Last 3 Encounters:   24 106/62   07/10/24 100/62   24 127/85      Wt Readings from Last 3 Encounters:   24 87 kg (191 lb 12.8 oz)   07/10/24 83.9 kg (185 lb)   24 83.9 kg (185 lb)      Total weight gain this pregnancy: 3.992 kg (8 lb 12.8 oz)    General: Awake, alert, no apparent distress  Respiratory: No increased work of breathing  Abdomen: Fundus soft and nontender  Extremity: Nontender, no edema    A/P  Diagnoses and all orders for this visit:    1. 25 weeks gestation of pregnancy (Primary)  Overview:  -PNL: 1hr GTT/CBC/RPR today  -Pap: NIL 2021, repeat postpartum  -Genetics: carrier screen/cFDNA/msAFP neg, anatomy US normal  -Imm: RI, VI, tdap > 27wga  -Contraception: discuss at future visit  -Birthplan: discuss at future visit  -Care coordination: accepts blood transfusions, no latex allergy, call schedule reviewed  -Likely wants elective IOL - ?    Orders:  -     POC Urinalysis Dipstick  -     Gestational Screen 1 Hr ([facility])  -     CBC (No Diff)  -     Treponema pallidum AB w/Reflex RPR    2. Screening for blood or protein in urine  -     POC Urinalysis Dipstick    3. Screening for diabetes mellitus (DM)  -     Gestational Screen 1 Hr ([facility])    4. Screening for iron deficiency anemia  -     CBC (No Diff)    5. Anxiety and depression  Overview:  -Started after she stopped breastfeeding last child.  Eventually started Celexa and took for a few months.  Improved anxiety for sure.  Stopped when she found out she was preg and feels okay.  Denies SI/HI.  Declines meds at this time.          Labor precautions reviewed  Return in about 4 weeks (around 2024) for Next f/u 4 wks then q2 wks x 3 then weekly x 3.    Autumn CURRY'Brien  MD Halie

## 2024-08-08 LAB
ERYTHROCYTE [DISTWIDTH] IN BLOOD BY AUTOMATED COUNT: 12.3 % (ref 12.3–15.4)
GLUCOSE 1H P 50 G GLC PO SERPL-MCNC: 152 MG/DL (ref 65–139)
HCT VFR BLD AUTO: 37.1 % (ref 34–46.6)
HGB BLD-MCNC: 12.8 G/DL (ref 12–15.9)
MCH RBC QN AUTO: 34.2 PG (ref 26.6–33)
MCHC RBC AUTO-ENTMCNC: 34.5 G/DL (ref 31.5–35.7)
MCV RBC AUTO: 99.2 FL (ref 79–97)
PLATELET # BLD AUTO: 246 10*3/MM3 (ref 140–450)
RBC # BLD AUTO: 3.74 10*6/MM3 (ref 3.77–5.28)
TREPONEMA PALLIDUM IGG+IGM AB [PRESENCE] IN SERUM OR PLASMA BY IMMUNOASSAY: NON REACTIVE
WBC # BLD AUTO: 9.66 10*3/MM3 (ref 3.4–10.8)

## 2024-08-10 NOTE — PROGRESS NOTES
Please call patient to let her know 1hr GTT was elevated and schedule 3 hr GTT while fasting for 8 hours.    Thank you,  Autumn Ambrose MD  08/10/24  13:19 EDT

## 2024-08-12 ENCOUNTER — TELEPHONE (OUTPATIENT)
Dept: OBSTETRICS AND GYNECOLOGY | Age: 29
End: 2024-08-12
Payer: COMMERCIAL

## 2024-08-12 NOTE — TELEPHONE ENCOUNTER
----- Message from Autumn Ambrose sent at 8/10/2024  1:19 PM EDT -----  Please call patient to let her know 1hr GTT was elevated and schedule 3 hr GTT while fasting for 8 hours.    Thank you,  Autumn Ambrose MD  08/10/24  13:19 EDT

## 2024-08-14 ENCOUNTER — PATIENT MESSAGE (OUTPATIENT)
Dept: OBSTETRICS AND GYNECOLOGY | Age: 29
End: 2024-08-14
Payer: COMMERCIAL

## 2024-08-14 NOTE — TELEPHONE ENCOUNTER
From: Viry Theodore  To: Autumn Ambrose  Sent: 8/14/2024 10:14 AM EDT  Subject: CBC     Hi Dr. Ambrose,     I saw my CBC results, I know my mom has a history of anemia and B12 deficiency. Do I need to take any supplements?     Have a good day!

## 2024-08-19 ENCOUNTER — LAB (OUTPATIENT)
Dept: OBSTETRICS AND GYNECOLOGY | Age: 29
End: 2024-08-19
Payer: COMMERCIAL

## 2024-08-19 DIAGNOSIS — R73.09 ELEVATED GLUCOSE TOLERANCE TEST: Primary | ICD-10-CM

## 2024-08-20 LAB
GLUCOSE 1H P 100 G GLC PO SERPL-MCNC: 152 MG/DL (ref 70–179)
GLUCOSE 2H P 100 G GLC PO SERPL-MCNC: 133 MG/DL (ref 70–154)
GLUCOSE 3H P 100 G GLC PO SERPL-MCNC: 98 MG/DL (ref 70–139)
GLUCOSE P FAST SERPL-MCNC: 82 MG/DL (ref 70–94)
Lab: NORMAL

## 2024-09-04 ENCOUNTER — ROUTINE PRENATAL (OUTPATIENT)
Dept: OBSTETRICS AND GYNECOLOGY | Age: 29
End: 2024-09-04
Payer: COMMERCIAL

## 2024-09-04 VITALS — WEIGHT: 195 LBS | BODY MASS INDEX: 34.54 KG/M2 | SYSTOLIC BLOOD PRESSURE: 104 MMHG | DIASTOLIC BLOOD PRESSURE: 72 MMHG

## 2024-09-04 DIAGNOSIS — R73.09 ABNORMAL GLUCOSE TOLERANCE TEST (GTT): ICD-10-CM

## 2024-09-04 DIAGNOSIS — Z3A.29 29 WEEKS GESTATION OF PREGNANCY: ICD-10-CM

## 2024-09-04 DIAGNOSIS — Z34.93 PRENATAL CARE IN THIRD TRIMESTER: Primary | ICD-10-CM

## 2024-09-04 LAB
GLUCOSE UR STRIP-MCNC: NEGATIVE MG/DL
PROT UR STRIP-MCNC: NEGATIVE MG/DL

## 2024-09-04 NOTE — PROGRESS NOTES
Chief Complaint   Patient presents with    Routine Prenatal Visit     29 weeks  CC:  pelvic and groin pain last week       HPI: 29 y.o.  at 29w6d     Doing well  Reports good FM  Denies LOF, bleeding or ctxs  Did have some pelvic/groin pain last week  Pt of Dr. Halie Engel:    24   BP: 104/72   Weight: 88.5 kg (195 lb)       ROS:  GI:  Negative  : Negative  Pulmonary: Negative     A/P  1. Intrauterine pregnancy at 29w6d   2. Pregnancy Risk:  NORMAL    Diagnoses and all orders for this visit:    1. Prenatal care in third trimester (Primary)    2. 29 weeks gestation of pregnancy  -     POC Urinalysis Dipstick    3. Abnormal glucose tolerance test (GTT)    Other orders  -     Tdap Vaccine => 6yo IM (BOOSTRIX/ADACEL)        -----------------------  PLAN:   PTL/FKC discussed  Return for Next scheduled follow up.      Gely Delgado, APRLEE ANN  2024 09:34 EDT

## 2024-09-18 ENCOUNTER — PATIENT MESSAGE (OUTPATIENT)
Dept: OBSTETRICS AND GYNECOLOGY | Age: 29
End: 2024-09-18
Payer: COMMERCIAL

## 2024-09-19 RX ORDER — ONDANSETRON 4 MG/1
4 TABLET, FILM COATED ORAL EVERY 4 HOURS PRN
Qty: 30 TABLET | Refills: 1 | Status: SHIPPED | OUTPATIENT
Start: 2024-09-19 | End: 2025-09-19

## 2024-09-26 ENCOUNTER — ROUTINE PRENATAL (OUTPATIENT)
Dept: OBSTETRICS AND GYNECOLOGY | Age: 29
End: 2024-09-26
Payer: COMMERCIAL

## 2024-09-26 VITALS — SYSTOLIC BLOOD PRESSURE: 116 MMHG | WEIGHT: 200.8 LBS | DIASTOLIC BLOOD PRESSURE: 60 MMHG | BODY MASS INDEX: 35.57 KG/M2

## 2024-09-26 DIAGNOSIS — F32.A ANXIETY AND DEPRESSION: ICD-10-CM

## 2024-09-26 DIAGNOSIS — Z13.89 SCREENING FOR BLOOD OR PROTEIN IN URINE: ICD-10-CM

## 2024-09-26 DIAGNOSIS — R73.09 ABNORMAL GLUCOSE TOLERANCE TEST (GTT): ICD-10-CM

## 2024-09-26 DIAGNOSIS — F41.9 ANXIETY AND DEPRESSION: ICD-10-CM

## 2024-09-26 DIAGNOSIS — Z3A.33 33 WEEKS GESTATION OF PREGNANCY: Primary | ICD-10-CM

## 2024-10-01 ENCOUNTER — TELEPHONE (OUTPATIENT)
Dept: OBSTETRICS AND GYNECOLOGY | Age: 29
End: 2024-10-01

## 2024-10-01 NOTE — TELEPHONE ENCOUNTER
Caller: Viry Theodore    Relationship: Self    Best call back number: [phone]    What was the call regarding: PT WOULD LIKE TO DISCUSS HER MATERNITY GLOBAL BENEFITS

## 2024-10-10 ENCOUNTER — ROUTINE PRENATAL (OUTPATIENT)
Dept: OBSTETRICS AND GYNECOLOGY | Age: 29
End: 2024-10-10
Payer: COMMERCIAL

## 2024-10-10 VITALS — BODY MASS INDEX: 35.85 KG/M2 | SYSTOLIC BLOOD PRESSURE: 126 MMHG | WEIGHT: 202.4 LBS | DIASTOLIC BLOOD PRESSURE: 72 MMHG

## 2024-10-10 DIAGNOSIS — R73.09 ABNORMAL GLUCOSE TOLERANCE TEST (GTT): ICD-10-CM

## 2024-10-10 DIAGNOSIS — F32.A ANXIETY AND DEPRESSION: ICD-10-CM

## 2024-10-10 DIAGNOSIS — F41.9 ANXIETY AND DEPRESSION: ICD-10-CM

## 2024-10-10 DIAGNOSIS — Z3A.35 35 WEEKS GESTATION OF PREGNANCY: Primary | ICD-10-CM

## 2024-10-10 DIAGNOSIS — Z13.89 SCREENING FOR BLOOD OR PROTEIN IN URINE: ICD-10-CM

## 2024-10-10 DIAGNOSIS — Z91.89 AT HIGH RISK FOR BREAST CANCER: ICD-10-CM

## 2024-10-10 NOTE — PROGRESS NOTES
Viry Theodore, a 29 y.o.  at 35w0d, presents for OB follow-up.  She is doing well today.  Denies loss of fluid, vaginal bleeding, and contractions.  Endorses fetal movements.    Objective  BP Readings from Last 3 Encounters:   10/10/24 126/72   24 116/60   24 104/72      Wt Readings from Last 3 Encounters:   10/10/24 91.8 kg (202 lb 6.4 oz)   24 91.1 kg (200 lb 12.8 oz)   24 88.5 kg (195 lb)      Total weight gain this pregnancy: 8.8 kg (19 lb 6.4 oz)    General: Awake, alert, no apparent distress  Respiratory: No increased work of breathing  Abdomen: Fundus soft and nontender  Extremity: Nontender, no edema    A/P  Diagnoses and all orders for this visit:    1. 35 weeks gestation of pregnancy (Primary)  Overview:  -PNL: plan for GBS at 36wga  -Pap: NIL 2021, repeat postpartum  -Genetics: carrier screen/cFDNA/msAFP neg, anatomy US normal  -Imm: RI, VI, s/p tdap, flu, and RSV vaccines  -Contraception: sprintec  -Birthplan: girl, breast, undecided on epidural  -Care coordination: accepts blood transfusions, no latex allergy, call schedule reviewed  -Wants to avoid IOL if possible    Orders:  -     POC Urinalysis Dipstick    2. Screening for blood or protein in urine  -     POC Urinalysis Dipstick    3. Abnormal glucose tolerance test (GTT)  Overview:  -Elevated 1 hour GTT with normal 3-hour  -Shoulder precautions at delivery      4. At high risk for breast cancer  Overview:  -Fhx mother with breast cancer at 43yo  -Progress Notes by Delfino eRyes MD (2024 08:00) consult with high risk breast clinic  -Recs to start screening at 31yo with MRI.  F/u with Dr. Reyes around 30yo.  Annual breast exam until then.      5. Anxiety and depression  Overview:  -Started after she stopped breastfeeding last child.  Eventually started Celexa and took for a few months.  Improved anxiety for sure.  Stopped when she found out she was preg and feels okay.  Denies SI/HI.  Declines  meds at this time.          Labor precautions reviewed  Return in about 1 week (around 10/17/2024) for Next f/u.    Autumn Ambrose MD

## 2024-10-17 ENCOUNTER — ROUTINE PRENATAL (OUTPATIENT)
Dept: OBSTETRICS AND GYNECOLOGY | Age: 29
End: 2024-10-17
Payer: COMMERCIAL

## 2024-10-17 VITALS — BODY MASS INDEX: 36.21 KG/M2 | DIASTOLIC BLOOD PRESSURE: 72 MMHG | WEIGHT: 204.4 LBS | SYSTOLIC BLOOD PRESSURE: 124 MMHG

## 2024-10-17 DIAGNOSIS — Z13.89 SCREENING FOR BLOOD OR PROTEIN IN URINE: ICD-10-CM

## 2024-10-17 DIAGNOSIS — Z3A.36 36 WEEKS GESTATION OF PREGNANCY: Primary | ICD-10-CM

## 2024-10-17 DIAGNOSIS — F32.A ANXIETY AND DEPRESSION: ICD-10-CM

## 2024-10-17 DIAGNOSIS — Z91.89 AT HIGH RISK FOR BREAST CANCER: ICD-10-CM

## 2024-10-17 DIAGNOSIS — Z36.85 SCREENING, ANTENATAL, FOR STREPTOCOCCUS B: ICD-10-CM

## 2024-10-17 DIAGNOSIS — R73.09 ABNORMAL GLUCOSE TOLERANCE TEST (GTT): ICD-10-CM

## 2024-10-17 DIAGNOSIS — F41.9 ANXIETY AND DEPRESSION: ICD-10-CM

## 2024-10-17 NOTE — PROGRESS NOTES
Viry Theodore, a 29 y.o.  at 36w0d, presents for OB follow-up.  She is doing well today.  Denies loss of fluid, vaginal bleeding, and contractions.  Endorses fetal movements.    Objective  BP Readings from Last 3 Encounters:   10/17/24 124/72   10/10/24 126/72   24 116/60      Wt Readings from Last 3 Encounters:   10/17/24 92.7 kg (204 lb 6.4 oz)   10/10/24 91.8 kg (202 lb 6.4 oz)   24 91.1 kg (200 lb 12.8 oz)      Total weight gain this pregnancy: 9.707 kg (21 lb 6.4 oz)    General: Awake, alert, no apparent distress  Respiratory: No increased work of breathing  Abdomen: Fundus soft and nontender  Extremity: Nontender, no edema    A/P  Diagnoses and all orders for this visit:    1. 36 weeks gestation of pregnancy (Primary)  Overview:  -PNL: GBS today  -Pap: NIL 2021, repeat postpartum  -Genetics: carrier screen/cFDNA/msAFP neg, anatomy US normal  -Imm: RI, VI, s/p tdap, flu, and RSV vaccines  -Contraception: sprintec  -Birthplan: girl, breast, undecided on epidural  -Care coordination: accepts blood transfusions, no latex allergy, call schedule reviewed  -Wants to avoid IOL if possible    Orders:  -     Strep B Screen - Swab, Vaginal/Rectum  -     POC Urinalysis Dipstick    2. Screening for blood or protein in urine  -     POC Urinalysis Dipstick    3. Screening, , for Streptococcus B  -     Strep B Screen - Swab, Vaginal/Rectum    4. Abnormal glucose tolerance test (GTT)  Overview:  -Elevated 1 hour GTT with normal 3-hour  -Shoulder precautions at delivery      5. At high risk for breast cancer  Overview:  -Fhx mother with breast cancer at 41yo  -Progress Notes by Delfino Reyes MD (2024 08:00) consult with high risk breast clinic  -Recs to start screening at 33yo with MRI.  F/u with Dr. Reyes around 32yo.  Annual breast exam until then.      6. Anxiety and depression  Overview:  -Started after she stopped breastfeeding last child.  Eventually started Celexa  and took for a few months.  Improved anxiety for sure.  Stopped when she found out she was preg and feels okay.  Denies SI/HI.  Declines meds at this time.          Labor precautions reviewed  Return in about 1 week (around 10/24/2024) for Next f/u.    Autumn Ambrose MD

## 2024-10-19 LAB — GP B STREP DNA SPEC QL NAA+PROBE: NEGATIVE

## 2024-10-24 ENCOUNTER — HOSPITAL ENCOUNTER (INPATIENT)
Facility: HOSPITAL | Age: 29
LOS: 1 days | Discharge: HOME | End: 2024-10-25
Attending: STUDENT IN AN ORGANIZED HEALTH CARE EDUCATION/TRAINING PROGRAM | Admitting: OBSTETRICS & GYNECOLOGY
Payer: COMMERCIAL

## 2024-10-24 ENCOUNTER — ROUTINE PRENATAL (OUTPATIENT)
Dept: OBSTETRICS AND GYNECOLOGY | Age: 29
End: 2024-10-24
Payer: COMMERCIAL

## 2024-10-24 VITALS
TEMPERATURE: 98.1 F | RESPIRATION RATE: 16 BRPM | SYSTOLIC BLOOD PRESSURE: 105 MMHG | HEART RATE: 91 BPM | DIASTOLIC BLOOD PRESSURE: 55 MMHG | BODY MASS INDEX: 35.46 KG/M2 | HEIGHT: 64 IN | OXYGEN SATURATION: 98 %

## 2024-10-24 VITALS — BODY MASS INDEX: 36.6 KG/M2 | SYSTOLIC BLOOD PRESSURE: 122 MMHG | DIASTOLIC BLOOD PRESSURE: 76 MMHG | WEIGHT: 206.6 LBS

## 2024-10-24 DIAGNOSIS — F41.9 ANXIETY AND DEPRESSION: ICD-10-CM

## 2024-10-24 DIAGNOSIS — Z3A.37 37 WEEKS GESTATION OF PREGNANCY: Primary | ICD-10-CM

## 2024-10-24 DIAGNOSIS — R73.09 ABNORMAL GLUCOSE TOLERANCE TEST (GTT): ICD-10-CM

## 2024-10-24 DIAGNOSIS — F32.A ANXIETY AND DEPRESSION: ICD-10-CM

## 2024-10-24 DIAGNOSIS — Z91.89 AT HIGH RISK FOR BREAST CANCER: ICD-10-CM

## 2024-10-24 DIAGNOSIS — Z13.89 SCREENING FOR BLOOD OR PROTEIN IN URINE: ICD-10-CM

## 2024-10-24 PROBLEM — Z37.9 NORMAL LABOR: Status: ACTIVE | Noted: 2024-10-24

## 2024-10-24 LAB
ABO GROUP BLD: NORMAL
BACTERIA UR QL AUTO: ABNORMAL /HPF
BILIRUB UR QL STRIP: NEGATIVE
BLD GP AB SCN SERPL QL: NEGATIVE
CLARITY UR: CLEAR
CLARITY, POC: CLEAR
COLOR UR: YELLOW
COLOR UR: YELLOW
DEPRECATED RDW RBC AUTO: 40.6 FL (ref 37–54)
ERYTHROCYTE [DISTWIDTH] IN BLOOD BY AUTOMATED COUNT: 12.2 % (ref 12.3–15.4)
GLUCOSE UR STRIP-MCNC: NEGATIVE MG/DL
GLUCOSE UR STRIP-MCNC: NEGATIVE MG/DL
HCT VFR BLD AUTO: 35.4 % (ref 34–46.6)
HGB BLD-MCNC: 11.9 G/DL (ref 12–15.9)
HGB UR QL STRIP.AUTO: NEGATIVE
HYALINE CASTS UR QL AUTO: ABNORMAL /LPF
KETONES UR QL STRIP: NEGATIVE
LEUKOCYTE ESTERASE UR QL STRIP.AUTO: ABNORMAL
MCH RBC QN AUTO: 31 PG (ref 26.6–33)
MCHC RBC AUTO-ENTMCNC: 33.6 G/DL (ref 31.5–35.7)
MCV RBC AUTO: 92.2 FL (ref 79–97)
NITRITE UR QL STRIP: NEGATIVE
PH UR STRIP.AUTO: 6 [PH] (ref 5–8)
PLATELET # BLD AUTO: 286 10*3/MM3 (ref 140–450)
PMV BLD AUTO: 10.5 FL (ref 6–12)
PROT UR QL STRIP: NEGATIVE
PROT UR STRIP-MCNC: NEGATIVE MG/DL
RBC # BLD AUTO: 3.84 10*6/MM3 (ref 3.77–5.28)
RBC # UR STRIP: ABNORMAL /HPF
REF LAB TEST METHOD: ABNORMAL
RH BLD: POSITIVE
SP GR UR STRIP: 1.01 (ref 1–1.03)
SQUAMOUS #/AREA URNS HPF: ABNORMAL /HPF
T&S EXPIRATION DATE: NORMAL
UROBILINOGEN UR QL STRIP: ABNORMAL
WBC # UR STRIP: ABNORMAL /HPF
WBC NRBC COR # BLD AUTO: 10.49 10*3/MM3 (ref 3.4–10.8)

## 2024-10-24 PROCEDURE — 86900 BLOOD TYPING SEROLOGIC ABO: CPT | Performed by: OBSTETRICS & GYNECOLOGY

## 2024-10-24 PROCEDURE — 87086 URINE CULTURE/COLONY COUNT: CPT | Performed by: OBSTETRICS & GYNECOLOGY

## 2024-10-24 PROCEDURE — 85027 COMPLETE CBC AUTOMATED: CPT | Performed by: OBSTETRICS & GYNECOLOGY

## 2024-10-24 PROCEDURE — 86850 RBC ANTIBODY SCREEN: CPT | Performed by: OBSTETRICS & GYNECOLOGY

## 2024-10-24 PROCEDURE — 59025 FETAL NON-STRESS TEST: CPT | Performed by: OBSTETRICS & GYNECOLOGY

## 2024-10-24 PROCEDURE — 86901 BLOOD TYPING SEROLOGIC RH(D): CPT | Performed by: OBSTETRICS & GYNECOLOGY

## 2024-10-24 PROCEDURE — 99285 EMERGENCY DEPT VISIT HI MDM: CPT | Performed by: OBSTETRICS & GYNECOLOGY

## 2024-10-24 PROCEDURE — 81001 URINALYSIS AUTO W/SCOPE: CPT | Performed by: OBSTETRICS & GYNECOLOGY

## 2024-10-24 PROCEDURE — 86780 TREPONEMA PALLIDUM: CPT | Performed by: OBSTETRICS & GYNECOLOGY

## 2024-10-24 RX ORDER — ONDANSETRON 4 MG/1
4 TABLET, ORALLY DISINTEGRATING ORAL EVERY 6 HOURS PRN
Status: DISCONTINUED | OUTPATIENT
Start: 2024-10-24 | End: 2024-10-25 | Stop reason: HOSPADM

## 2024-10-24 RX ORDER — SODIUM CHLORIDE, SODIUM LACTATE, POTASSIUM CHLORIDE, CALCIUM CHLORIDE 600; 310; 30; 20 MG/100ML; MG/100ML; MG/100ML; MG/100ML
100 INJECTION, SOLUTION INTRAVENOUS CONTINUOUS
Status: DISCONTINUED | OUTPATIENT
Start: 2024-10-24 | End: 2024-10-25 | Stop reason: HOSPADM

## 2024-10-24 RX ORDER — SODIUM CHLORIDE 0.9 % (FLUSH) 0.9 %
10 SYRINGE (ML) INJECTION AS NEEDED
Status: DISCONTINUED | OUTPATIENT
Start: 2024-10-24 | End: 2024-10-25 | Stop reason: HOSPADM

## 2024-10-24 RX ORDER — FENTANYL/ROPIVACAINE/NS/PF 2MCG/ML-.2
10 PLASTIC BAG, INJECTION (ML) INJECTION CONTINUOUS
Status: DISCONTINUED | OUTPATIENT
Start: 2024-10-24 | End: 2024-10-25 | Stop reason: HOSPADM

## 2024-10-24 RX ORDER — SODIUM CHLORIDE 0.9 % (FLUSH) 0.9 %
10 SYRINGE (ML) INJECTION EVERY 12 HOURS SCHEDULED
Status: DISCONTINUED | OUTPATIENT
Start: 2024-10-24 | End: 2024-10-25 | Stop reason: HOSPADM

## 2024-10-24 RX ORDER — OXYTOCIN/0.9 % SODIUM CHLORIDE 30/500 ML
250 PLASTIC BAG, INJECTION (ML) INTRAVENOUS CONTINUOUS
OUTPATIENT
Start: 2024-10-24 | End: 2024-10-24

## 2024-10-24 RX ORDER — ONDANSETRON 2 MG/ML
4 INJECTION INTRAMUSCULAR; INTRAVENOUS ONCE AS NEEDED
Status: DISCONTINUED | OUTPATIENT
Start: 2024-10-24 | End: 2024-10-25 | Stop reason: HOSPADM

## 2024-10-24 RX ORDER — MAGNESIUM CARB/ALUMINUM HYDROX 105-160MG
30 TABLET,CHEWABLE ORAL ONCE
Status: DISCONTINUED | OUTPATIENT
Start: 2024-10-24 | End: 2024-10-25 | Stop reason: HOSPADM

## 2024-10-24 RX ORDER — SODIUM CHLORIDE 9 MG/ML
40 INJECTION, SOLUTION INTRAVENOUS ONCE
Status: DISCONTINUED | OUTPATIENT
Start: 2024-10-24 | End: 2024-10-25 | Stop reason: HOSPADM

## 2024-10-24 RX ORDER — MISOPROSTOL 200 UG/1
800 TABLET ORAL ONCE AS NEEDED
OUTPATIENT
Start: 2024-10-24

## 2024-10-24 RX ORDER — LIDOCAINE HYDROCHLORIDE 10 MG/ML
0.5 INJECTION, SOLUTION INFILTRATION; PERINEURAL ONCE AS NEEDED
Status: DISCONTINUED | OUTPATIENT
Start: 2024-10-24 | End: 2024-10-25 | Stop reason: HOSPADM

## 2024-10-24 RX ORDER — ACETAMINOPHEN 325 MG/1
650 TABLET ORAL EVERY 4 HOURS PRN
Status: DISCONTINUED | OUTPATIENT
Start: 2024-10-24 | End: 2024-10-25 | Stop reason: HOSPADM

## 2024-10-24 RX ORDER — FAMOTIDINE 10 MG/ML
20 INJECTION, SOLUTION INTRAVENOUS ONCE AS NEEDED
Status: DISCONTINUED | OUTPATIENT
Start: 2024-10-24 | End: 2024-10-25 | Stop reason: HOSPADM

## 2024-10-24 RX ORDER — DIPHENHYDRAMINE HYDROCHLORIDE 50 MG/ML
12.5 INJECTION INTRAMUSCULAR; INTRAVENOUS EVERY 8 HOURS PRN
Status: DISCONTINUED | OUTPATIENT
Start: 2024-10-24 | End: 2024-10-25 | Stop reason: HOSPADM

## 2024-10-24 RX ORDER — CARBOPROST TROMETHAMINE 250 UG/ML
250 INJECTION, SOLUTION INTRAMUSCULAR
OUTPATIENT
Start: 2024-10-24

## 2024-10-24 RX ORDER — EPHEDRINE SULFATE 50 MG/ML
5 INJECTION, SOLUTION INTRAVENOUS AS NEEDED
Status: DISCONTINUED | OUTPATIENT
Start: 2024-10-24 | End: 2024-10-25 | Stop reason: HOSPADM

## 2024-10-24 RX ORDER — METHYLERGONOVINE MALEATE 0.2 MG/ML
200 INJECTION INTRAVENOUS ONCE AS NEEDED
OUTPATIENT
Start: 2024-10-24

## 2024-10-24 RX ORDER — OXYTOCIN/0.9 % SODIUM CHLORIDE 30/500 ML
999 PLASTIC BAG, INJECTION (ML) INTRAVENOUS ONCE
OUTPATIENT
Start: 2024-10-24 | End: 2024-10-24

## 2024-10-24 RX ORDER — ONDANSETRON 2 MG/ML
4 INJECTION INTRAMUSCULAR; INTRAVENOUS EVERY 6 HOURS PRN
Status: DISCONTINUED | OUTPATIENT
Start: 2024-10-24 | End: 2024-10-25 | Stop reason: HOSPADM

## 2024-10-24 NOTE — OBED NOTES
"[facility]  Viry Kirby  : 1995  MRN: [id number]  CSN: [id number]    OB ED Provider Note    Subjective   No chief complaint on file.    Viry Kirby is a 29 y.o. year old  with an Estimated Date of Delivery: 24 currently at 37w0d presenting with ***.    Prenatal care has been with ***.  It has been {PRENATAL CARE:01899::\"benign\"}.    OB History    Para Term  AB Living   3 1 1 0 1 1   SAB IAB Ectopic Molar Multiple Live Births   0 0 0 0 0 1      # Outcome Date GA Lbr William/2nd Weight Sex Type Anes PTL Lv   3 Current            2 Term 22 39w0d  3690 g (8 lb 2.2 oz) F Vag-Spont EPI N ALICE      Birth Comments: Infnat scale #4      Name: RIA KIRBY      Apgar1: 8  Apgar5: 8   1 AB      TAB        Past Medical History:   Diagnosis Date    Anxiety and depression 2024    Dysmenorrhea     Migraine     Raynaud's syndrome      Past Surgical History:   Procedure Laterality Date    KNEE ACL RECONSTRUCTION Right 2016    WISDOM TOOTH EXTRACTION  2013     No current facility-administered medications for this encounter.    No Known Allergies  Social History    Tobacco Use      Smoking status: Never        Passive exposure: Never      Smokeless tobacco: Never    Review of Systems      Objective   LMP  (LMP Unknown)   General: {Exam - general findings:73600::\"well developed; well nourished\",\"no acute distress\",\"mentation appropriate\"}   Abdomen: soft, non-tender; no masses  gravid    FHT's: {FHT's assessment:94840::\"reassuring\",\"category 1\"}      Cervix: {Cervix checked?:46762::\"was not checked.\"}   Presentation: {desc; fetal presentation:00290::\"cephalic\"}   Contractions: {obgyn contractions reg/irre}   Chest: Unlabored respirations    CV:  {heart exam:12088}   Ext:   No C/C/E   Back: CVA tenderness is {PRESENT/ABSENT:43841} {right/left/bilateral:79312}        Prenatal Labs  Lab Results   Component Value Date    HGB 12.8 2024    RUBELLAABIGG " 4.74 04/11/2024    HEPBSAG Negative 04/11/2024    ABSCRN Negative 04/11/2024    MRR6MXB8 Non Reactive 04/11/2024    HEPCVIRUSABY Non Reactive 04/11/2024     (H) 08/07/2024    GGTFASTING 82 08/19/2024    BLB8TEZN 152 08/19/2024    XWO0WPIN 133 08/19/2024    GLF6OXWM 98 08/19/2024    STREPGPB Negative 10/17/2024    URINECX Final report 04/11/2024    CHLAMNAA Negative 04/11/2024    NGONORRHON Negative 04/11/2024       Current Labs Reviewed   {Labs - all:24534}       Assessment   IUP at 37w0d  ***     Plan   ***    Mayte Reza MD  10/24/2024  18:56 EDT

## 2024-10-24 NOTE — PROGRESS NOTES
Viry Theodore, a 29 y.o.  at 37w0d, presents for OB follow-up.  She is doing well today.  Denies loss of fluid, vaginal bleeding.  Having intermittent contractions no faster than every 10 minutes.  Endorses fetal movements.    Objective  BP Readings from Last 3 Encounters:   10/24/24 122/76   10/17/24 124/72   10/10/24 126/72      Wt Readings from Last 3 Encounters:   10/24/24 93.7 kg (206 lb 9.6 oz)   10/17/24 92.7 kg (204 lb 6.4 oz)   10/10/24 91.8 kg (202 lb 6.4 oz)      Total weight gain this pregnancy: 10.7 kg (23 lb 9.6 oz)    General: Awake, alert, no apparent distress  Respiratory: No increased work of breathing  Abdomen: Fundus soft and nontender  Extremity: Nontender, no edema    A/P  Diagnoses and all orders for this visit:    1. 37 weeks gestation of pregnancy (Primary)  Overview:  -PNL: GBS neg  -Pap: NIL 2021, repeat postpartum  -Genetics: carrier screen/cFDNA/msAFP neg, anatomy US normal  -Imm: RI, VI, s/p tdap, flu, and RSV vaccines  -Contraception: sprintec  -Birthplan: girl, breast, undecided on epidural  -Care coordination: accepts blood transfusions, no latex allergy, call schedule reviewed  -Wants to avoid IOL if possible, wants membrane sweeps    Orders:  -     POC Urinalysis Dipstick    2. Screening for blood or protein in urine  -     POC Urinalysis Dipstick    3. Abnormal glucose tolerance test (GTT)  Overview:  -Elevated 1 hour GTT with normal 3-hour  -Shoulder precautions at delivery      4. At high risk for breast cancer  Overview:  -Fhx mother with breast cancer at 41yo  -Progress Notes by Delfino Reyes MD (2024 08:00) consult with high risk breast clinic  -Recs to start screening at 33yo with MRI.  F/u with Dr. Reyes around 30yo.  Annual breast exam until then.      5. Anxiety and depression  Overview:  -Started after she stopped breastfeeding last child.  Eventually started Celexa and took for a few months.  Improved anxiety for sure.  Stopped when she  found out she was preg and feels okay.  Denies SI/HI.  Declines meds at this time.          Labor precautions reviewed  Return in about 1 week (around 10/31/2024) for Next f/u.    Autumn Ambrose MD

## 2024-10-25 PROBLEM — Z37.9 NORMAL LABOR: Status: RESOLVED | Noted: 2024-10-24 | Resolved: 2024-10-25

## 2024-10-25 LAB
BACTERIA SPEC AEROBE CULT: NO GROWTH
TREPONEMA PALLIDUM IGG+IGM AB [PRESENCE] IN SERUM OR PLASMA BY IMMUNOASSAY: NORMAL

## 2024-10-25 NOTE — OBED NOTES
"[facility]  Viry Kirby  : 1995  MRN: [id number]  CSN: [id number]    OB ED Provider Note    Subjective   Chief Complaint   Patient presents with    Contractions     Pt presents to [facility] for c/o ctx that began around 1600 after membrane sweep in office today. Reports 3/10, approx 5 mins apart. +FM. Denies VB, LOF.     Viry Kirby is a 29 y.o. year old  with an Estimated Date of Delivery: 24 currently at 37w0d presenting with painful contractions. She reports she has been renetta since yesterday but they worsened this afternoon after her membranes were swept this morning. They are occurring every 5 minutes, rating 6/10. She denies leaking or bleeding. She endorses normal fetal movement.    Prenatal care has been with Dr. Ambrose.  It has been complicated by h/o depression/anxiety.    OB History    Para Term  AB Living   3 1 1 0 1 1   SAB IAB Ectopic Molar Multiple Live Births   0 0 0 0 0 1      # Outcome Date GA Lbr William/2nd Weight Sex Type Anes PTL Lv   3 Current            2 Term 22 39w0d  3690 g (8 lb 2.2 oz) F Vag-Spont EPI N ALICE      Birth Comments: Infnat scale #4      Name: RIA KIRBY      Apgar1: 8  Apgar5: 8   1 AB      TAB        Past Medical History:   Diagnosis Date    Anxiety and depression 2024    Dysmenorrhea     Migraine     Raynaud's syndrome      Past Surgical History:   Procedure Laterality Date    KNEE ACL RECONSTRUCTION Right 2016    WISDOM TOOTH EXTRACTION       No current facility-administered medications for this encounter.    No Known Allergies  Social History    Tobacco Use      Smoking status: Never        Passive exposure: Never      Smokeless tobacco: Never    Review of Systems  +abdominal pain      Objective   /86 (BP Location: Right arm, Patient Position: Lying)   Pulse 110   Temp 98 °F (36.7 °C) (Oral)   Resp 16   Ht 162.6 cm (64\")   LMP  (LMP Unknown)   SpO2 98%   BMI 35.46 kg/m² "   General: well developed; well nourished  no acute distress  mentation appropriate   Abdomen: soft, non-tender; no masses  gravid    FHT's: NST reactive, 145, moderate, +accels, no decels      Cervix: was checked (by RN): 1 cm / 40 % / -2   Presentation: cephalic   Contractions: regular, q 2-5 min   Chest: Unlabored respirations    CV:  normal rate, regular rhythm,  no murmurs, rubs, or gallops   Ext:   No C/C/E   Back: CVA tenderness is absent bilateral        Prenatal Labs  Lab Results   Component Value Date    HGB 12.8 08/07/2024    RUBELLAABIGG 4.74 04/11/2024    HEPBSAG Negative 04/11/2024    ABSCRN Negative 04/11/2024    EHL1FTX3 Non Reactive 04/11/2024    HEPCVIRUSABY Non Reactive 04/11/2024     (H) 08/07/2024    GGTFASTING 82 08/19/2024    OZV4CCVO 152 08/19/2024    GFR0HCGQ 133 08/19/2024    NZZ0KTYA 98 08/19/2024    STREPGPB Negative 10/17/2024    URINECX Final report 04/11/2024    CHLAMNAA Negative 04/11/2024    NGONORRHON Negative 04/11/2024          Assessment and Plan  IUP at 37w0d with spontaneous labor  Changed cervix from 1 to 2cm, ctx q 2-3 min now  GBS negative  Fetal status reassuring, NST reactive  Discussed w/ Dr. Leslie Reza MD  10/24/2024  20:09 EDT

## 2024-10-25 NOTE — H&P (VIEW-ONLY)
"[facility]  Viry Kirby  : 1995  MRN: [id number]  CSN: [id number]    OB ED Provider Note    Subjective   Chief Complaint   Patient presents with    Contractions     Pt presents to [facility] for c/o ctx that began around 1600 after membrane sweep in office today. Reports 3/10, approx 5 mins apart. +FM. Denies VB, LOF.     Viry Kirby is a 29 y.o. year old  with an Estimated Date of Delivery: 24 currently at 37w0d presenting with painful contractions. She reports she has been renetta since yesterday but they worsened this afternoon after her membranes were swept this morning. They are occurring every 5 minutes, rating 6/10. She denies leaking or bleeding. She endorses normal fetal movement.    Prenatal care has been with Dr. Ambrose.  It has been complicated by h/o depression/anxiety.    OB History    Para Term  AB Living   3 1 1 0 1 1   SAB IAB Ectopic Molar Multiple Live Births   0 0 0 0 0 1      # Outcome Date GA Lbr William/2nd Weight Sex Type Anes PTL Lv   3 Current            2 Term 22 39w0d  3690 g (8 lb 2.2 oz) F Vag-Spont EPI N ALICE      Birth Comments: Infnat scale #4      Name: RIA KIRBY      Apgar1: 8  Apgar5: 8   1 AB      TAB        Past Medical History:   Diagnosis Date    Anxiety and depression 2024    Dysmenorrhea     Migraine     Raynaud's syndrome      Past Surgical History:   Procedure Laterality Date    KNEE ACL RECONSTRUCTION Right 2016    WISDOM TOOTH EXTRACTION       No current facility-administered medications for this encounter.    No Known Allergies  Social History    Tobacco Use      Smoking status: Never        Passive exposure: Never      Smokeless tobacco: Never    Review of Systems  +abdominal pain      Objective   /86 (BP Location: Right arm, Patient Position: Lying)   Pulse 110   Temp 98 °F (36.7 °C) (Oral)   Resp 16   Ht 162.6 cm (64\")   LMP  (LMP Unknown)   SpO2 98%   BMI 35.46 kg/m² "   General: well developed; well nourished  no acute distress  mentation appropriate   Abdomen: soft, non-tender; no masses  gravid    FHT's: NST reactive, 145, moderate, +accels, no decels      Cervix: was checked (by RN): 1 cm / 40 % / -2   Presentation: cephalic   Contractions: regular, q 2-5 min   Chest: Unlabored respirations    CV:  normal rate, regular rhythm,  no murmurs, rubs, or gallops   Ext:   No C/C/E   Back: CVA tenderness is absent bilateral        Prenatal Labs  Lab Results   Component Value Date    HGB 12.8 08/07/2024    RUBELLAABIGG 4.74 04/11/2024    HEPBSAG Negative 04/11/2024    ABSCRN Negative 04/11/2024    ZPI0SPI9 Non Reactive 04/11/2024    HEPCVIRUSABY Non Reactive 04/11/2024     (H) 08/07/2024    GGTFASTING 82 08/19/2024    XXT0RZBS 152 08/19/2024    MDJ6DQCW 133 08/19/2024    BFI9JEFB 98 08/19/2024    STREPGPB Negative 10/17/2024    URINECX Final report 04/11/2024    CHLAMNAA Negative 04/11/2024    NGONORRHON Negative 04/11/2024          Assessment and Plan  IUP at 37w0d with spontaneous labor  Changed cervix from 1 to 2cm, ctx q 2-3 min now  GBS negative  Fetal status reassuring, NST reactive  Discussed w/ Dr. Leslie Reza MD  10/24/2024  20:09 EDT

## 2024-10-25 NOTE — PLAN OF CARE
Problem: Adult Inpatient Plan of Care  Goal: Plan of Care Review  Outcome: Progressing  Flowsheets (Taken 10/25/2024 0123)  Outcome Evaluation: Pt. came to L&D for contractions. No cervical change noted after 5 hrs, pt. requested to go home  Goal: Patient-Specific Goal (Individualized)  Outcome: Progressing  Goal: Absence of Hospital-Acquired Illness or Injury  Outcome: Progressing  Goal: Optimal Comfort and Wellbeing  Outcome: Progressing  Goal: Readiness for Transition of Care  Outcome: Progressing  Intervention: Mutually Develop Transition Plan  Recent Flowsheet Documentation  Taken 10/25/2024 0120 by Manasa Hutson, RN  Equipment Currently Used at Home: none  Transportation Anticipated: family or friend will provide  Transportation Concerns: none  Concerns to be Addressed: no discharge needs identified  Readmission Within the Last 30 Days: no previous admission in last 30 days  Patient/Family Anticipated Services at Transition: none  Patient/Family Anticipates Transition to: home with family     Problem: Labor  Goal: Hemostasis  Outcome: Progressing  Goal: Stable Fetal Wellbeing  Outcome: Progressing  Goal: Effective Progression to Delivery  Outcome: Progressing  Goal: Absence of Infection Signs and Symptoms  Outcome: Progressing  Goal: Acceptable Pain Control  Outcome: Progressing  Goal: Normal Uterine Contraction Pattern  Outcome: Progressing   Goal Outcome Evaluation:              Outcome Evaluation: Pt. came to L&D for contractions. No cervical change noted after 5 hrs, pt. requested to go home

## 2024-10-26 NOTE — PAYOR COMM NOTE
"Saumya Kirby (29 y.o. Female)       Date of Birth   1995    Social Security Number       Address   [address] [id number]    Home Phone   [phone]    MRN   [id number]       Yarsani   None    Marital Status                               Admission Date   10/24/24    Admission Type   Emergency    Admitting Provider   Mayte Reza MD    Attending Provider       Department, Room/Bed   [facility] LABOR DELIVERY, L14/1       Discharge Date   10/25/2024    Discharge Disposition   Home or Self Care    Discharge Destination                                 Attending Provider: (none)   Allergies: No Known Allergies    Isolation: None   Infection: None   Code Status: Prior    Ht: 162.6 cm (64\")   Wt: 93.7 kg (206 lb 9.6 oz)    Admission Cmt: None   Principal Problem: Normal labor [O80,Z37.9]                   Active Insurance as of 10/24/2024       Primary Coverage       Payor Plan Insurance Group Employer/Plan Group    ANTHEM BLUE CROSS AN[employer]M BLUE CROSS BLUE [employer]IELD PPO [id number]       Payor Plan Address Payor Plan Phone Number Payor Plan Fax Number Effective Dates    [address] [phone]  1/1/2024 - None Entered    [place] [address]         Subscriber Name Subscriber Birth Date Member ID       SAUMYA KIRBY 1995 [id number]                     Emergency Contacts        (Rel.) Home Phone Work Phone Mobile Phone    DENNYS GORDON (Spouse) [phone] -- --    KirbyKerry (Mother) -- -- [phone]              Insurance Information                  [employer]HEM BLUE CROSS/[employer]HE[employer] BLUE CROSS BLUE SHIELD PPO Phone: [phone]    Subscriber: Saumya Kirby Subscriber#: [id number]    Group#: [id number] Precert#: --    Authorization#: -- Effective Date: --          Problem List             Codes Noted - Resolved       Non-Hospital    Abnormal glucose tolerance test (GTT) ICD-10-CM: R73.09  ICD-9-CM: 790.22 9/4/2024 " - Present    At high risk for breast cancer ICD-10-CM: Z91.89  ICD-9-CM: V49.89 6/20/2024 - Present    37 weeks gestation of pregnancy ICD-10-CM: Z3A.37  ICD-9-CM: V22.2 4/11/2024 - Present    Anxiety and depression ICD-10-CM: F41.9, F32.A  ICD-9-CM: 300.00, 311 3/7/2024 - Present     History & Physical    No notes of this type exist for this encounter.       No current facility-administered medications on file as of 10/25/2024.     Lab Results (last 72 hours)       Procedure Component Value Units Date/Time    Treponema pallidum AB w/Reflex RPR [[id number]]  (Normal) Collected: 10/24/24 2101    Specimen: Blood Updated: 10/25/24 0010     Treponemal AB Total Non-Reactive    Narrative:      Reactive results will reflex RPR testing.    CBC (No Diff) [[id number]]  (Abnormal) Collected: 10/24/24 2101    Specimen: Blood Updated: 10/24/24 2114     WBC 10.49 10*3/mm3      RBC 3.84 10*6/mm3      Hemoglobin 11.9 g/dL      Hematocrit 35.4 %      MCV 92.2 fL      MCH 31.0 pg      MCHC 33.6 g/dL      RDW 12.2 %      RDW-SD 40.6 fl      MPV 10.5 fL      Platelets 286 10*3/mm3     Urinalysis With Culture If Indicated - Urine, Clean Catch [[id number]]  (Abnormal) Collected: 10/24/24 1918    Specimen: Urine, Clean Catch Updated: 10/24/24 1952     Color, UA Yellow     Appearance, UA Clear     pH, UA 6.0     Specific Gravity, UA 1.006     Glucose, UA Negative     Ketones, UA Negative     Bilirubin, UA Negative     Blood, UA Negative     Protein, UA Negative     Leuk Esterase, UA Trace     Nitrite, UA Negative     Urobilinogen, UA 0.2 E.U./dL    Narrative:      In absence of clinical symptoms, the presence of pyuria, bacteria, and/or nitrites on the urinalysis result does not correlate with infection.    Urinalysis, Microscopic Only - Urine, Clean Catch [[id number]]  (Abnormal) Collected: 10/24/24 1918    Specimen: Urine, Clean Catch Updated: 10/24/24 1952     RBC, UA 0-2 /HPF      WBC, UA 6-10 /HPF      Bacteria, UA None Seen /HPF       Squamous Epithelial Cells, UA 7-12 /HPF      Hyaline Casts, UA None Seen /LPF      Methodology Automated Microscopy          Imaging Results (Last 72 Hours)       ** No results found for the last 72 hours. **          ECG/EMG Results (last 72 hours)       ** No results found for the last 72 hours. **          Orders (last 72 hrs)        Start     Ordered    10/25/24 0117  Discharge patient  Once         10/25/24 0117    10/25/24 0117  Discontinue IV  Once,   Status:  Canceled         10/25/24 0117    10/25/24 0000  Vital Signs q 4 while awake  Every 4 Hours,   Status:  Canceled      Comments: While the patient is awake.    10/24/24 2030    10/25/24 0000  Activity as Tolerated         10/25/24 0117    10/25/24 0000  Discharge Follow-up with Specialty: your primary OB/gyn         10/25/24 0117    10/25/24 0000  Call MD With Problems / Concerns        Comments: Instructions: call or come to the hospital if you have persistent contractions, if your water may have broken (you have gush of fluid, persistent wetness, or trickling of fluid per vagina), if you have vaginal bleeding, decreased fetal movement    10/25/24 0117    10/24/24 2130  sodium chloride 0.9 % flush 10 mL  Every 12 Hours Scheduled,   Status:  Discontinued         10/24/24 2030    10/24/24 2130  sodium chloride 0.9 % infusion 40 mL  Once,   Status:  Discontinued         10/24/24 2030    10/24/24 2130  lactated ringers infusion  Continuous,   Status:  Discontinued         10/24/24 2030    10/24/24 2130  mineral oil liquid 30 mL  Once,   Status:  Discontinued         10/24/24 2030    10/24/24 2130  fentaNYL 2mcg/mL and ropivacaine 0.2% in NS epidural 100mL  Continuous,   Status:  Discontinued         10/24/24 2042    10/24/24 2043  Vital Signs Per Anesthesia Guidelines  Per Order Details,   Status:  Canceled        Comments: Every 2 Minutes x5, Every 5 Minutes x4, Then If Stable Every 15 Minutes    10/24/24 2042    10/24/24 2043  Start IV with #16 or #18  "gauge angiocath.  Once,   Status:  Canceled         10/24/24 2042    10/24/24 2043  Fetal Heart Rate Monitor  Once         10/24/24 2042    10/24/24 2043  Nurse or anesthesiologist to remain with patient for 15 minutes following dosing.  Until Discontinued,   Status:  Canceled         10/24/24 2042    10/24/24 2043  Facilitate maternal postion on side and maintain uterine displacement.  Until Discontinued,   Status:  Canceled         10/24/24 2042    10/24/24 2043  Consult anesthesia services prior to changing epidural infusion/rate.  Until Discontinued,   Status:  Canceled         10/24/24 2042    10/24/24 2043  Nurse may remove epidural catheter after delivery.  Until Discontinued,   Status:  Canceled         10/24/24 2042    10/24/24 2043  Insert Indwelling Urinary Catheter  Once,   Status:  Canceled        Placed in \"And\" Linked Group    10/24/24 2042    10/24/24 2043  Assess Need for Indwelling Urinary Catheter - Follow Removal Protocol  Continuous,   Status:  Canceled        Comments: Indwelling Urinary Catheter Removal Criteria  Discontinue Indwelling Urinary Catheter Unless One of the Following is Present:  Urinary Retention or Obstruction  Chronic Urinary Catheter Use  End of Life  Critical Illness with Strict I/O   Tract or Abdominal Surgery  Stage 3/4 Sacral / Perineal Wound  Required Activity Restriction: Trauma  Required Activity Restriction: Spine Surgery  If Patient is Being Followed by Urology Contact Them PRIOR to Removal  Do Not Remove Indwelling Urinary Catheter Order is Present with a CLINICAL REASON to Maintain the Catheter. Provider is Required to Include a Clinical Reason to Maintain a Urinary Catheter    Patient Admitted With Indwelling Urinary Catheter (Not Placed at [facility])  Assess for Continued Need & Document Medical Necessity  If Infection is Suspected, Contact the Provider       Placed in \"And\" Linked Group    10/24/24 2042    10/24/24 2043  Urinary Catheter Care  Every " "Shift,   Status:  Canceled      Placed in \"And\" Linked Group    10/24/24 2042    10/24/24 2043  Notify physician for the following conditions:  Until Discontinued,   Status:  Canceled         10/24/24 2042    10/24/24 2042  ePHEDrine injection 5 mg  As Needed,   Status:  Discontinued         10/24/24 2042    10/24/24 2042  ondansetron (ZOFRAN) injection 4 mg  Once As Needed,   Status:  Discontinued         10/24/24 2042    10/24/24 2042  famotidine (PEPCID) injection 20 mg  Once As Needed,   Status:  Discontinued         10/24/24 2042    10/24/24 2042  diphenhydrAMINE (BENADRYL) injection 12.5 mg  Every 8 Hours PRN,   Status:  Discontinued         10/24/24 2042    10/24/24 2031  Code Status and Medical Interventions: CPR (Attempt to Resuscitate); Full Support  Continuous,   Status:  Canceled         10/24/24 2030    10/24/24 2031  Obtain Informed Consent  Once,   Status:  Canceled         10/24/24 2030    10/24/24 2031  Mini-Prep Prior to Delivery  Once,   Status:  Canceled         10/24/24 2030    10/24/24 2031  Continuous Fetal Monitoring With NST on Admission and Prior to Initiation of Oxytocin.  Per Order Details,   Status:  Canceled        Comments: Continuous Fetal Monitoring With NST on Admission & Prior to Initiation of Oxytocin.    10/24/24 2030    10/24/24 2031  External Uterine Contraction Monitoring  Per Hospital Policy,   Status:  Canceled         10/24/24 2030    10/24/24 2031  Notify Provider (Specified)  Continuous,   Status:  Canceled        Comments: Open Order Report to View Parameters Requiring Provider Notification    10/24/24 2030    10/24/24 2031  Notify Provider of Tachysystole (Per Hospital Algorithm)  Continuous,   Status:  Canceled        Comments: Open Order Report to View Parameters Requiring Provider Notification    10/24/24 2030    10/24/24 2031  Notify Provider if Membranes Ruptured, Bleeding Greater Than 1 Pad Per Hour, Fetal Heart Tone Abnormality or Severe Pain  Continuous,   " Status:  Canceled        Comments: Open Order Report to View Parameters Requiring Provider Notification    10/24/24 2030    10/24/24 2031  Weigh Patient Daily  Daily,   Status:  Canceled       10/24/24 2030    10/24/24 2031  Initiate Group Beta Strep (GBS) Prophylaxis Protocol, If Criteria Met  Continuous,   Status:  Canceled        Comments: NO TREATMENT RECOMMENDED IF: 1) Maternal GBS Status Known Negative 2) Scheduled  Birth With Intact Membranes, Not in Labor 3) Maternal GBS Status Unknown, No Risk Factors  TREAT WITH ANTIBIOTICS IF:  1) Maternal GBS Status Known Positive 2) Maternal GBS Status Unknown With Risk Factors: a)  Previous Infant Affected By GBS Infection b) GBS Urinary Tract Infection (UTI) or Bacteriuria During Pregnancy c) Unexplained Maternal Fever (100.4F (38C) or Greater) During Labor d)  Prolonged Rupture of Membranes (18 or More Hours) e) Gestational Age Less Than 37 Weeks    10/24/24 2030    10/24/24 2031  CBC (No Diff)  STAT         10/24/24 2030    10/24/24 2031  Treponema pallidum AB w/Reflex RPR  Once         10/24/24 2030    10/24/24 2031  Type & Screen  STAT         10/24/24 2030    10/24/24 2031  Insert Peripheral IV  Once,   Status:  Canceled         10/24/24 2030    10/24/24 2031  Saline Lock & Maintain IV Access  Continuous,   Status:  Canceled         10/24/24 2030    10/24/24 2031  Diet: Liquid; Clear Liquid; Fluid Consistency: Thin (IDDSI 0)  Diet Effective Now,   Status:  Canceled         10/24/24 2030    10/24/24 2030  sodium chloride 0.9 % flush 10 mL  As Needed,   Status:  Discontinued         10/24/24 2030    10/24/24 2030  lidocaine (XYLOCAINE) 1 % injection 0.5 mL  Once As Needed,   Status:  Discontinued         10/24/24 2030    10/24/24 2030  lactated ringers bolus 500 mL  Once As Needed,   Status:  Discontinued         10/24/24 2030    10/24/24 2030  acetaminophen (TYLENOL) tablet 650 mg  Every 4 Hours PRN,   Status:  Discontinued         10/24/24 2030     "10/24/24 2030  ondansetron ODT (ZOFRAN-ODT) disintegrating tablet 4 mg  Every 6 Hours PRN,   Status:  Discontinued        Placed in \"Or\" Linked Group    10/24/24 2030    10/24/24 2030  ondansetron (ZOFRAN) injection 4 mg  Every 6 Hours PRN,   Status:  Discontinued        Placed in \"Or\" Linked Group    10/24/24 2030    10/24/24 2030  Position Change - For Intra-Uterine Resusitation for Hypertonus, HyperStimulation or Non-Reassuring Fetal Status  As Needed,   Status:  Canceled       10/24/24 2030    10/24/24 2008  Admit To Obstetrics Inpatient  Once         10/24/24 2009    10/24/24 2008  VTE Prophylaxis Not Indicated: Low Risk; No Risk Factors (0)  Once         10/24/24 2009    10/24/24 1945  Urinalysis, Microscopic Only - Urine, Clean Catch  Once        Comments: Collect and Hold for gestational age > 24 weeks      10/24/24 1944    10/24/24 1945  Urine Culture - Urine, Urine, Clean Catch  Once        Comments: Collect and Hold for gestational age > 24 weeks      10/24/24 1944    10/24/24 1852  Vital Signs  Per Hospital Policy,   Status:  Canceled        Comments: Repeat blood pressure every 30 minutes, until specified.    10/24/24 1852    10/24/24 1852  Fetal Nonstress Test  Once,   Status:  Canceled        Comments: For any patient >= 24 wks gestation.    10/24/24 1852    10/24/24 1852  Pulse Oximetry, Spot  Once,   Status:  Canceled         10/24/24 1852    10/24/24 1852  POC Glucose STAT  STAT,   Status:  Canceled        Comments: For any patient with any type of diabetes.      10/24/24 1852    10/24/24 1852  Urinalysis With Culture If Indicated - Urine, Clean Catch  Once        Comments: Collect and Hold for gestational age > 24 weeks      10/24/24 1852    10/24/24 1852  Continuous Uterine Monitoring - For Gestational Age >24 weeks  Once,   Status:  Canceled         10/24/24 1852    10/24/24 1852  NPO Diet NPO Type: Strict NPO  Diet Effective Now,   Status:  Canceled         10/24/24 1852    10/24/24 1852  " "Vaginal Exam  Once,   Status:  Canceled        Comments: Perform unless patient has vaginal bleeding without known placental site, known placental previa, <36 weeks with no abdominal , or complain of ROM and <36 weeks    10/24/24 1852    10/24/24 1852  POC PH Fluid (Nitrazine)  Once,   Status:  Canceled        Comments: If patient is presenting with possible ROM or leaking      10/24/24 1852    10/24/24 1852  Rapid Assay For ROM - Amniotic Fluid, Amniotic Sac  Once,   Status:  Canceled         10/24/24 1852    Signed and Held  Notify Provider (Specified)  Continuous        Comments: Open Order Report to View Parameters Requiring Provider Notification    Signed and Held    Signed and Held  Vital Signs Per Hospital Policy  Per Hospital Policy         Signed and Held    Signed and Held  Fundal & Lochia Check  Per Order Details      Comments: Every 15 Minutes x4, Then Every 30 Minutes x2, Then Every Shift    Signed and Held    Signed and Held  Fundal & Lochia Check  Every Shift       Signed and Held    Signed and Held  Diet: Regular/House; Fluid Consistency: Thin (IDDSI 0)  Diet Effective Now         Signed and Held    Signed and Held  oxytocin (PITOCIN) 30 units in 0.9% sodium chloride 500 mL (premix)  Once        Placed in \"Followed by\" Linked Group    Signed and Held    Signed and Held  oxytocin (PITOCIN) 30 units in 0.9% sodium chloride 500 mL (premix)  Continuous        Placed in \"Followed by\" Linked Group    Signed and Held    Signed and Held  methylergonovine (METHERGINE) injection 200 mcg  Once As Needed         Signed and Held    Signed and Held  carboprost (HEMABATE) injection 250 mcg  Every 15 Minutes PRN         Signed and Held    Signed and Held  miSOPROStol (CYTOTEC) tablet 800 mcg  Once As Needed         Signed and Held    Signed and Held  Transfer to postpartum when discharge criteria met.  Until Discontinued         Signed and Held                  Operative/Procedure Notes (last 72 hours)  Notes from " 10/22/24 2231 through 10/25/24 2231   No notes of this type exist for this encounter.       Physician Progress Notes (last 72 hours)  Notes from 10/22/24 2231 through 10/25/24 2231   No notes of this type exist for this encounter.       Consult Notes (last 72 hours)  Notes from 10/22/24 2231 through 10/25/24 2231   No notes of this type exist for this encounter.

## 2024-10-28 NOTE — INTERVAL H&P NOTE
Viry Theodore presented with painful contractions and cervical change from 1 cm to 2 cm. After admission her contractions decreased in frequency and duration and she made no further cervical change. She desired discharge as she was not in active labor. Her blood pressures and fetal tracing were normal. Plan to follow up in the office, labor precautions    Saritha Nelson MD  10/28/2024  08:26 EDT

## 2024-10-31 ENCOUNTER — ROUTINE PRENATAL (OUTPATIENT)
Dept: OBSTETRICS AND GYNECOLOGY | Age: 29
End: 2024-10-31
Payer: COMMERCIAL

## 2024-10-31 VITALS — WEIGHT: 203.4 LBS | DIASTOLIC BLOOD PRESSURE: 74 MMHG | BODY MASS INDEX: 34.91 KG/M2 | SYSTOLIC BLOOD PRESSURE: 126 MMHG

## 2024-10-31 DIAGNOSIS — Z3A.38 38 WEEKS GESTATION OF PREGNANCY: Primary | ICD-10-CM

## 2024-10-31 DIAGNOSIS — F32.A ANXIETY AND DEPRESSION: ICD-10-CM

## 2024-10-31 DIAGNOSIS — F41.9 ANXIETY AND DEPRESSION: ICD-10-CM

## 2024-10-31 DIAGNOSIS — Z91.89 AT HIGH RISK FOR BREAST CANCER: ICD-10-CM

## 2024-10-31 DIAGNOSIS — Z13.89 SCREENING FOR BLOOD OR PROTEIN IN URINE: ICD-10-CM

## 2024-10-31 DIAGNOSIS — R73.09 ABNORMAL GLUCOSE TOLERANCE TEST (GTT): ICD-10-CM

## 2024-10-31 NOTE — PROGRESS NOTES
Chief Complaint   Patient presents with    Routine Prenatal Visit     38w0d, c/o irregular contractions       Viry Theodore, a 29 y.o.  at 38w0d, presents for OB follow-up.  She is doing well today.  Denies loss of fluid, vaginal bleeding.  Lots of intermittent contractions.  Endorses fetal movements.    Objective  BP Readings from Last 3 Encounters:   10/31/24 126/74   10/24/24 105/55   10/24/24 122/76      Wt Readings from Last 3 Encounters:   10/31/24 92.3 kg (203 lb 6.4 oz)   10/24/24 93.7 kg (206 lb 9.6 oz)   10/17/24 92.7 kg (204 lb 6.4 oz)      Total weight gain this pregnancy: 9.253 kg (20 lb 6.4 oz)    General: Awake, alert, no apparent distress  Respiratory: No increased work of breathing  Abdomen: Fundus soft and nontender  Extremity: Nontender, no edema    A/P  Diagnoses and all orders for this visit:    1. 38 weeks gestation of pregnancy (Primary)  Overview:  -PNL: GBS neg  -Pap: NIL 2021, repeat postpartum  -Genetics: carrier screen/cFDNA/msAFP neg, anatomy US normal  -Imm: RI, VI, s/p tdap, flu, and RSV vaccines  -Contraception: sprintec  -Birthplan: girl, breast, undecided on epidural  -Care coordination: accepts blood transfusions, no latex allergy, call schedule reviewed  -IOL scheduled , arrive at 0700    Orders:  -     POC Urinalysis Dipstick  -     Labor Induction; Future    2. Abnormal glucose tolerance test (GTT)  Overview:  -Elevated 1 hour GTT with normal 3-hour  -Shoulder precautions at delivery      3. At high risk for breast cancer  Overview:  -Fhx mother with breast cancer at 43yo  -Progress Notes by Delfino Reyes MD (2024 08:00) consult with high risk breast clinic  -Recs to start screening at 31yo with MRI.  F/u with Dr. Reyes around 32yo.  Annual breast exam until then.      4. Anxiety and depression  Overview:  -Started after she stopped breastfeeding last child.  Eventually started Celexa and took for a few months.  Improved anxiety for sure.   Stopped when she found out she was preg and feels okay.  Denies SI/HI.  Declines meds at this time.      5. Screening for blood or protein in urine  -     POC Urinalysis Dipstick        Labor precautions reviewed  Return in about 1 week (around 11/7/2024) for Next f/u.    Autumn Ambrose MD

## 2024-11-06 ENCOUNTER — TELEPHONE (OUTPATIENT)
Dept: OBSTETRICS AND GYNECOLOGY | Age: 29
End: 2024-11-06
Payer: COMMERCIAL

## 2024-11-06 NOTE — TELEPHONE ENCOUNTER
Patient calling c/o having thin clear discharge , no odor , some painful cramps not ctx .  Patient is concerned about amniotic fluid loss . Reports good FM . Please advise ?

## 2024-11-07 ENCOUNTER — ROUTINE PRENATAL (OUTPATIENT)
Dept: OBSTETRICS AND GYNECOLOGY | Age: 29
End: 2024-11-07
Payer: COMMERCIAL

## 2024-11-07 VITALS — SYSTOLIC BLOOD PRESSURE: 120 MMHG | DIASTOLIC BLOOD PRESSURE: 70 MMHG | WEIGHT: 207.6 LBS | BODY MASS INDEX: 35.63 KG/M2

## 2024-11-07 DIAGNOSIS — F32.A ANXIETY AND DEPRESSION: ICD-10-CM

## 2024-11-07 DIAGNOSIS — Z91.89 AT HIGH RISK FOR BREAST CANCER: ICD-10-CM

## 2024-11-07 DIAGNOSIS — Z13.89 SCREENING FOR BLOOD OR PROTEIN IN URINE: ICD-10-CM

## 2024-11-07 DIAGNOSIS — F41.9 ANXIETY AND DEPRESSION: ICD-10-CM

## 2024-11-07 DIAGNOSIS — R73.09 ABNORMAL GLUCOSE TOLERANCE TEST (GTT): ICD-10-CM

## 2024-11-07 DIAGNOSIS — Z3A.39 39 WEEKS GESTATION OF PREGNANCY: Primary | ICD-10-CM

## 2024-11-07 NOTE — PROGRESS NOTES
Chief Complaint   Patient presents with    Routine Prenatal Visit     39w0d, c/o irregular contractions       Viry Theodore, a 29 y.o.  at 39w0d, presents for OB follow-up.  She is doing well today.  Denies loss of fluid, vaginal bleeding.  Intermittent contractions.  Endorses fetal movements.    Objective  BP Readings from Last 3 Encounters:   24 120/70   10/31/24 126/74   10/24/24 105/55      Wt Readings from Last 3 Encounters:   24 94.2 kg (207 lb 9.6 oz)   10/31/24 92.3 kg (203 lb 6.4 oz)   10/24/24 93.7 kg (206 lb 9.6 oz)      Total weight gain this pregnancy: 11.2 kg (24 lb 9.6 oz)    General: Awake, alert, no apparent distress  Respiratory: No increased work of breathing  Abdomen: Fundus soft and nontender  Extremity: Nontender, no edema    A/P  Diagnoses and all orders for this visit:    1. 39 weeks gestation of pregnancy (Primary)  Overview:  -PNL: GBS neg  -Pap: NIL 2021, repeat postpartum  -Genetics: carrier screen/cFDNA/msAFP neg, anatomy US normal  -Imm: RI, VI, s/p tdap, flu, and RSV vaccines  -Contraception: sprintec  -Birthplan: girl, breast, undecided on epidural  -Care coordination: accepts blood transfusions, no latex allergy, call schedule reviewed  -IOL scheduled , arrive at 0700    Orders:  -     POC Urinalysis Dipstick    2. Screening for blood or protein in urine  -     POC Urinalysis Dipstick    3. At high risk for breast cancer  Overview:  -Fhx mother with breast cancer at 43yo  -Progress Notes by Delfino Reyes MD (2024 08:00) consult with high risk breast clinic  -Recs to start screening at 33yo with MRI.  F/u with Dr. Reyes around 32yo.  Annual breast exam until then.      4. Abnormal glucose tolerance test (GTT)  Overview:  -Elevated 1 hour GTT with normal 3-hour  -Shoulder precautions at delivery      5. Anxiety and depression  Overview:  -Started after she stopped breastfeeding last child.  Eventually started Celexa and took for a few  months.  Improved anxiety for sure.  Stopped when she found out she was preg and feels okay.  Denies SI/HI.  Declines meds at this time.          Labor precautions reviewed  Return in about 27 days (around 12/4/2024) for postpartum visit.    Autumn Ambrose MD

## 2024-11-12 ENCOUNTER — ANESTHESIA EVENT (OUTPATIENT)
Dept: LABOR AND DELIVERY | Facility: HOSPITAL | Age: 29
End: 2024-11-12
Payer: COMMERCIAL

## 2024-11-12 ENCOUNTER — HOSPITAL ENCOUNTER (INPATIENT)
Facility: HOSPITAL | Age: 29
LOS: 2 days | Discharge: HOME | End: 2024-11-14
Attending: OBSTETRICS & GYNECOLOGY | Admitting: OBSTETRICS & GYNECOLOGY
Payer: COMMERCIAL

## 2024-11-12 ENCOUNTER — ANESTHESIA (OUTPATIENT)
Dept: LABOR AND DELIVERY | Facility: HOSPITAL | Age: 29
End: 2024-11-12
Payer: COMMERCIAL

## 2024-11-12 PROBLEM — Z34.90 PREGNANCY: Status: ACTIVE | Noted: 2024-11-12

## 2024-11-12 LAB
ABO GROUP BLD: NORMAL
ALBUMIN SERPL-MCNC: 3.2 G/DL (ref 3.5–5.2)
ALBUMIN/GLOB SERPL: 1 G/DL
ALP SERPL-CCNC: 157 U/L (ref 39–117)
ALT SERPL W P-5'-P-CCNC: 13 U/L (ref 1–33)
ANION GAP SERPL CALCULATED.3IONS-SCNC: 11.8 MMOL/L (ref 5–15)
AST SERPL-CCNC: 20 U/L (ref 1–32)
ATMOSPHERIC PRESS: 752.7 MMHG
ATMOSPHERIC PRESS: 754.1 MMHG
BASE EXCESS BLDCOA CALC-SCNC: -4.7 MMOL/L (ref -2–2)
BASE EXCESS BLDCOV CALC-SCNC: -4.1 MMOL/L (ref -30–30)
BASOPHILS # BLD AUTO: 0.03 10*3/MM3 (ref 0–0.2)
BASOPHILS NFR BLD AUTO: 0.4 % (ref 0–1.5)
BDY SITE: ABNORMAL
BDY SITE: NORMAL
BILIRUB SERPL-MCNC: 0.3 MG/DL (ref 0–1.2)
BLD GP AB SCN SERPL QL: NEGATIVE
BUN SERPL-MCNC: 6 MG/DL (ref 6–20)
BUN/CREAT SERPL: 10 (ref 7–25)
CALCIUM SPEC-SCNC: 8.6 MG/DL (ref 8.6–10.5)
CHLORIDE SERPL-SCNC: 106 MMOL/L (ref 98–107)
CO2 BLDA-SCNC: 23 MMOL/L (ref 23–27)
CO2 BLDA-SCNC: 27.6 MMOL/L (ref 23–27)
CO2 SERPL-SCNC: 19.2 MMOL/L (ref 22–29)
CREAT SERPL-MCNC: 0.6 MG/DL (ref 0.57–1)
DEPRECATED RDW RBC AUTO: 42 FL (ref 37–54)
EGFRCR SERPLBLD CKD-EPI 2021: 126.3 ML/MIN/1.73
EOSINOPHIL # BLD AUTO: 0.06 10*3/MM3 (ref 0–0.4)
EOSINOPHIL NFR BLD AUTO: 0.8 % (ref 0.3–6.2)
ERYTHROCYTE [DISTWIDTH] IN BLOOD BY AUTOMATED COUNT: 12.7 % (ref 12.3–15.4)
GLOBULIN UR ELPH-MCNC: 3.3 GM/DL
GLUCOSE SERPL-MCNC: 83 MG/DL (ref 65–99)
HCO3 BLDCOA-SCNC: 25.5 MMOL/L (ref 22–28)
HCO3 BLDCOV-SCNC: 21.7 MMOL/L
HCT VFR BLD AUTO: 37.3 % (ref 34–46.6)
HGB BLD-MCNC: 12.3 G/DL (ref 12–15.9)
IMM GRANULOCYTES # BLD AUTO: 0.03 10*3/MM3 (ref 0–0.05)
IMM GRANULOCYTES NFR BLD AUTO: 0.4 % (ref 0–0.5)
LYMPHOCYTES # BLD AUTO: 1.67 10*3/MM3 (ref 0.7–3.1)
LYMPHOCYTES NFR BLD AUTO: 21.7 % (ref 19.6–45.3)
MCH RBC QN AUTO: 30 PG (ref 26.6–33)
MCHC RBC AUTO-ENTMCNC: 33 G/DL (ref 31.5–35.7)
MCV RBC AUTO: 91 FL (ref 79–97)
MODALITY: ABNORMAL
MODALITY: NORMAL
MONOCYTES # BLD AUTO: 0.49 10*3/MM3 (ref 0.1–0.9)
MONOCYTES NFR BLD AUTO: 6.4 % (ref 5–12)
NEUTROPHILS NFR BLD AUTO: 5.4 10*3/MM3 (ref 1.7–7)
NEUTROPHILS NFR BLD AUTO: 70.3 % (ref 42.7–76)
NRBC BLD AUTO-RTO: 0 /100 WBC (ref 0–0.2)
PCO2 BLDCOA: 68.6 MMHG (ref 43–63)
PCO2 BLDCOV: 41.3 MM HG (ref 35–51.3)
PH BLDCOA: 7.18 PH UNITS (ref 7.18–7.34)
PH BLDCOV: 7.33 PH UNITS (ref 7.26–7.4)
PLATELET # BLD AUTO: 302 10*3/MM3 (ref 140–450)
PMV BLD AUTO: 10.9 FL (ref 6–12)
PO2 BLDCOA: <22.1 MMHG (ref 12–26)
PO2 BLDCOV: 21.7 MM HG (ref 19–39)
POTASSIUM SERPL-SCNC: 4.1 MMOL/L (ref 3.5–5.2)
PROT SERPL-MCNC: 6.5 G/DL (ref 6–8.5)
RBC # BLD AUTO: 4.1 10*6/MM3 (ref 3.77–5.28)
RH BLD: POSITIVE
SAO2 % BLDCOV: NORMAL %
SODIUM SERPL-SCNC: 137 MMOL/L (ref 136–145)
T&S EXPIRATION DATE: NORMAL
TREPONEMA PALLIDUM IGG+IGM AB [PRESENCE] IN SERUM OR PLASMA BY IMMUNOASSAY: NORMAL
WBC NRBC COR # BLD AUTO: 7.68 10*3/MM3 (ref 3.4–10.8)

## 2024-11-12 PROCEDURE — 80053 COMPREHEN METABOLIC PANEL: CPT | Performed by: OBSTETRICS & GYNECOLOGY

## 2024-11-12 PROCEDURE — 86900 BLOOD TYPING SEROLOGIC ABO: CPT | Performed by: OBSTETRICS & GYNECOLOGY

## 2024-11-12 PROCEDURE — 82803 BLOOD GASES ANY COMBINATION: CPT

## 2024-11-12 PROCEDURE — 25810000003 LACTATED RINGERS PER 1000 ML: Performed by: OBSTETRICS & GYNECOLOGY

## 2024-11-12 PROCEDURE — 25010000002 ONDANSETRON PER 1 MG: Performed by: OBSTETRICS & GYNECOLOGY

## 2024-11-12 PROCEDURE — 86780 TREPONEMA PALLIDUM: CPT | Performed by: OBSTETRICS & GYNECOLOGY

## 2024-11-12 PROCEDURE — 25010000002 LIDOCAINE-EPINEPHRINE (PF) 1 %-1:200000 SOLUTION: Performed by: ANESTHESIOLOGY

## 2024-11-12 PROCEDURE — 86850 RBC ANTIBODY SCREEN: CPT | Performed by: OBSTETRICS & GYNECOLOGY

## 2024-11-12 PROCEDURE — C1755 CATHETER, INTRASPINAL: HCPCS | Performed by: ANESTHESIOLOGY

## 2024-11-12 PROCEDURE — 3E033VJ INTRODUCTION OF OTHER HORMONE INTO PERIPHERAL VEIN, PERCUTANEOUS APPROACH: ICD-10-PCS | Performed by: OBSTETRICS & GYNECOLOGY

## 2024-11-12 PROCEDURE — 86901 BLOOD TYPING SEROLOGIC RH(D): CPT | Performed by: OBSTETRICS & GYNECOLOGY

## 2024-11-12 PROCEDURE — 10907ZC DRAINAGE OF AMNIOTIC FLUID, THERAPEUTIC FROM PRODUCTS OF CONCEPTION, VIA NATURAL OR ARTIFICIAL OPENING: ICD-10-PCS | Performed by: OBSTETRICS & GYNECOLOGY

## 2024-11-12 PROCEDURE — 85025 COMPLETE CBC W/AUTO DIFF WBC: CPT | Performed by: OBSTETRICS & GYNECOLOGY

## 2024-11-12 PROCEDURE — 25810000003 SODIUM CHLORIDE 0.9 % SOLUTION: Performed by: OBSTETRICS & GYNECOLOGY

## 2024-11-12 PROCEDURE — 25010000002 LIDOCAINE 1 % SOLUTION: Performed by: ANESTHESIOLOGY

## 2024-11-12 PROCEDURE — 59400 OBSTETRICAL CARE: CPT | Performed by: OBSTETRICS & GYNECOLOGY

## 2024-11-12 PROCEDURE — 82803 BLOOD GASES ANY COMBINATION: CPT | Performed by: OBSTETRICS & GYNECOLOGY

## 2024-11-12 RX ORDER — DIPHENHYDRAMINE HYDROCHLORIDE 50 MG/ML
12.5 INJECTION INTRAMUSCULAR; INTRAVENOUS EVERY 8 HOURS PRN
Status: DISCONTINUED | OUTPATIENT
Start: 2024-11-12 | End: 2024-11-12 | Stop reason: HOSPADM

## 2024-11-12 RX ORDER — DIPHENHYDRAMINE HCL 25 MG
25 CAPSULE ORAL NIGHTLY PRN
Status: DISCONTINUED | OUTPATIENT
Start: 2024-11-12 | End: 2024-11-14 | Stop reason: HOSPADM

## 2024-11-12 RX ORDER — OXYTOCIN/0.9 % SODIUM CHLORIDE 30/500 ML
125 PLASTIC BAG, INJECTION (ML) INTRAVENOUS ONCE AS NEEDED
Status: COMPLETED | OUTPATIENT
Start: 2024-11-12 | End: 2024-11-12

## 2024-11-12 RX ORDER — LIDOCAINE HYDROCHLORIDE 10 MG/ML
INJECTION, SOLUTION INFILTRATION; PERINEURAL AS NEEDED
Status: DISCONTINUED | OUTPATIENT
Start: 2024-11-12 | End: 2024-11-12 | Stop reason: SURG

## 2024-11-12 RX ORDER — ACETAMINOPHEN 325 MG/1
650 TABLET ORAL EVERY 6 HOURS PRN
Status: DISCONTINUED | OUTPATIENT
Start: 2024-11-12 | End: 2024-11-14 | Stop reason: HOSPADM

## 2024-11-12 RX ORDER — IBUPROFEN 600 MG/1
600 TABLET, FILM COATED ORAL EVERY 6 HOURS PRN
Status: DISCONTINUED | OUTPATIENT
Start: 2024-11-12 | End: 2024-11-14 | Stop reason: HOSPADM

## 2024-11-12 RX ORDER — FAMOTIDINE 10 MG/ML
20 INJECTION, SOLUTION INTRAVENOUS ONCE AS NEEDED
Status: DISCONTINUED | OUTPATIENT
Start: 2024-11-12 | End: 2024-11-12 | Stop reason: HOSPADM

## 2024-11-12 RX ORDER — TRANEXAMIC ACID 10 MG/ML
1000 INJECTION, SOLUTION INTRAVENOUS ONCE AS NEEDED
Status: DISCONTINUED | OUTPATIENT
Start: 2024-11-12 | End: 2024-11-14 | Stop reason: HOSPADM

## 2024-11-12 RX ORDER — ONDANSETRON 2 MG/ML
4 INJECTION INTRAMUSCULAR; INTRAVENOUS EVERY 6 HOURS PRN
Status: DISCONTINUED | OUTPATIENT
Start: 2024-11-12 | End: 2024-11-12 | Stop reason: HOSPADM

## 2024-11-12 RX ORDER — TERBUTALINE SULFATE 1 MG/ML
0.25 INJECTION, SOLUTION SUBCUTANEOUS AS NEEDED
Status: DISCONTINUED | OUTPATIENT
Start: 2024-11-12 | End: 2024-11-12 | Stop reason: HOSPADM

## 2024-11-12 RX ORDER — HYDROCORTISONE 25 MG/G
CREAM TOPICAL AS NEEDED
Status: DISCONTINUED | OUTPATIENT
Start: 2024-11-12 | End: 2024-11-14 | Stop reason: HOSPADM

## 2024-11-12 RX ORDER — ONDANSETRON 2 MG/ML
4 INJECTION INTRAMUSCULAR; INTRAVENOUS EVERY 6 HOURS PRN
Status: DISCONTINUED | OUTPATIENT
Start: 2024-11-12 | End: 2024-11-14 | Stop reason: HOSPADM

## 2024-11-12 RX ORDER — FAMOTIDINE 10 MG/ML
20 INJECTION, SOLUTION INTRAVENOUS 2 TIMES DAILY PRN
Status: DISCONTINUED | OUTPATIENT
Start: 2024-11-12 | End: 2024-11-12 | Stop reason: HOSPADM

## 2024-11-12 RX ORDER — SODIUM CHLORIDE, SODIUM LACTATE, POTASSIUM CHLORIDE, CALCIUM CHLORIDE 600; 310; 30; 20 MG/100ML; MG/100ML; MG/100ML; MG/100ML
125 INJECTION, SOLUTION INTRAVENOUS CONTINUOUS
Status: ACTIVE | OUTPATIENT
Start: 2024-11-12 | End: 2024-11-14

## 2024-11-12 RX ORDER — LIDOCAINE HYDROCHLORIDE 10 MG/ML
0.5 INJECTION, SOLUTION INFILTRATION; PERINEURAL ONCE AS NEEDED
Status: DISCONTINUED | OUTPATIENT
Start: 2024-11-12 | End: 2024-11-12 | Stop reason: HOSPADM

## 2024-11-12 RX ORDER — DOCUSATE SODIUM 100 MG/1
100 CAPSULE, LIQUID FILLED ORAL 2 TIMES DAILY
Status: DISCONTINUED | OUTPATIENT
Start: 2024-11-12 | End: 2024-11-14 | Stop reason: HOSPADM

## 2024-11-12 RX ORDER — ONDANSETRON 4 MG/1
4 TABLET, ORALLY DISINTEGRATING ORAL EVERY 6 HOURS PRN
Status: DISCONTINUED | OUTPATIENT
Start: 2024-11-12 | End: 2024-11-12 | Stop reason: HOSPADM

## 2024-11-12 RX ORDER — HYDROCODONE BITARTRATE AND ACETAMINOPHEN 10; 325 MG/1; MG/1
1 TABLET ORAL EVERY 4 HOURS PRN
Status: DISCONTINUED | OUTPATIENT
Start: 2024-11-12 | End: 2024-11-14 | Stop reason: HOSPADM

## 2024-11-12 RX ORDER — EPHEDRINE SULFATE 50 MG/ML
5 INJECTION, SOLUTION INTRAVENOUS
Status: DISCONTINUED | OUTPATIENT
Start: 2024-11-12 | End: 2024-11-12 | Stop reason: HOSPADM

## 2024-11-12 RX ORDER — HYDROCODONE BITARTRATE AND ACETAMINOPHEN 5; 325 MG/1; MG/1
1 TABLET ORAL EVERY 4 HOURS PRN
Status: DISCONTINUED | OUTPATIENT
Start: 2024-11-12 | End: 2024-11-14 | Stop reason: HOSPADM

## 2024-11-12 RX ORDER — OXYTOCIN/0.9 % SODIUM CHLORIDE 30/500 ML
2-20 PLASTIC BAG, INJECTION (ML) INTRAVENOUS
Status: DISCONTINUED | OUTPATIENT
Start: 2024-11-12 | End: 2024-11-14 | Stop reason: HOSPADM

## 2024-11-12 RX ORDER — OXYTOCIN/0.9 % SODIUM CHLORIDE 30/500 ML
125 PLASTIC BAG, INJECTION (ML) INTRAVENOUS ONCE AS NEEDED
Status: DISCONTINUED | OUTPATIENT
Start: 2024-11-12 | End: 2024-11-14 | Stop reason: HOSPADM

## 2024-11-12 RX ORDER — OXYTOCIN/0.9 % SODIUM CHLORIDE 30/500 ML
999 PLASTIC BAG, INJECTION (ML) INTRAVENOUS ONCE
Status: DISCONTINUED | OUTPATIENT
Start: 2024-11-12 | End: 2024-11-12 | Stop reason: HOSPADM

## 2024-11-12 RX ORDER — ONDANSETRON 2 MG/ML
4 INJECTION INTRAMUSCULAR; INTRAVENOUS ONCE AS NEEDED
Status: DISCONTINUED | OUTPATIENT
Start: 2024-11-12 | End: 2024-11-12 | Stop reason: HOSPADM

## 2024-11-12 RX ORDER — FAMOTIDINE 20 MG/1
20 TABLET, FILM COATED ORAL 2 TIMES DAILY PRN
Status: DISCONTINUED | OUTPATIENT
Start: 2024-11-12 | End: 2024-11-12 | Stop reason: HOSPADM

## 2024-11-12 RX ORDER — MISOPROSTOL 200 UG/1
800 TABLET ORAL ONCE AS NEEDED
Status: DISCONTINUED | OUTPATIENT
Start: 2024-11-12 | End: 2024-11-12 | Stop reason: HOSPADM

## 2024-11-12 RX ORDER — MORPHINE SULFATE 2 MG/ML
2 INJECTION, SOLUTION INTRAMUSCULAR; INTRAVENOUS EVERY 4 HOURS PRN
Status: DISCONTINUED | OUTPATIENT
Start: 2024-11-12 | End: 2024-11-12 | Stop reason: HOSPADM

## 2024-11-12 RX ORDER — SODIUM CHLORIDE 0.9 % (FLUSH) 0.9 %
10 SYRINGE (ML) INJECTION AS NEEDED
Status: DISCONTINUED | OUTPATIENT
Start: 2024-11-12 | End: 2024-11-12 | Stop reason: HOSPADM

## 2024-11-12 RX ORDER — MAGNESIUM CARB/ALUMINUM HYDROX 105-160MG
30 TABLET,CHEWABLE ORAL ONCE AS NEEDED
Status: DISCONTINUED | OUTPATIENT
Start: 2024-11-12 | End: 2024-11-12 | Stop reason: HOSPADM

## 2024-11-12 RX ORDER — NALOXONE HCL 0.4 MG/ML
0.4 VIAL (ML) INJECTION
Status: DISCONTINUED | OUTPATIENT
Start: 2024-11-12 | End: 2024-11-12 | Stop reason: HOSPADM

## 2024-11-12 RX ORDER — METHYLERGONOVINE MALEATE 0.2 MG/ML
200 INJECTION INTRAVENOUS ONCE AS NEEDED
Status: DISCONTINUED | OUTPATIENT
Start: 2024-11-12 | End: 2024-11-12 | Stop reason: HOSPADM

## 2024-11-12 RX ORDER — BISACODYL 10 MG
10 SUPPOSITORY, RECTAL RECTAL DAILY PRN
Status: DISCONTINUED | OUTPATIENT
Start: 2024-11-13 | End: 2024-11-14 | Stop reason: HOSPADM

## 2024-11-12 RX ORDER — CARBOPROST TROMETHAMINE 250 UG/ML
250 INJECTION, SOLUTION INTRAMUSCULAR
Status: DISCONTINUED | OUTPATIENT
Start: 2024-11-12 | End: 2024-11-12 | Stop reason: HOSPADM

## 2024-11-12 RX ORDER — FENTANYL/ROPIVACAINE/NS/PF 2MCG/ML-.2
10 PLASTIC BAG, INJECTION (ML) INJECTION CONTINUOUS
Status: DISCONTINUED | OUTPATIENT
Start: 2024-11-12 | End: 2024-11-14 | Stop reason: HOSPADM

## 2024-11-12 RX ORDER — ACETAMINOPHEN 325 MG/1
650 TABLET ORAL EVERY 4 HOURS PRN
Status: DISCONTINUED | OUTPATIENT
Start: 2024-11-12 | End: 2024-11-12 | Stop reason: HOSPADM

## 2024-11-12 RX ORDER — OXYTOCIN/0.9 % SODIUM CHLORIDE 30/500 ML
250 PLASTIC BAG, INJECTION (ML) INTRAVENOUS CONTINUOUS
Status: ACTIVE | OUTPATIENT
Start: 2024-11-12 | End: 2024-11-12

## 2024-11-12 RX ADMIN — LIDOCAINE HYDROCHLORIDE 4 ML: 10; .005 INJECTION, SOLUTION EPIDURAL; INFILTRATION; INTRACAUDAL; PERINEURAL at 12:23

## 2024-11-12 RX ADMIN — SODIUM CHLORIDE, SODIUM LACTATE, POTASSIUM CHLORIDE, CALCIUM CHLORIDE 999 ML/HR: 20; 30; 600; 310 INJECTION, SOLUTION INTRAVENOUS at 12:51

## 2024-11-12 RX ADMIN — ONDANSETRON 4 MG: 2 INJECTION, SOLUTION INTRAMUSCULAR; INTRAVENOUS at 15:55

## 2024-11-12 RX ADMIN — SODIUM CHLORIDE, SODIUM LACTATE, POTASSIUM CHLORIDE, CALCIUM CHLORIDE 125 ML/HR: 20; 30; 600; 310 INJECTION, SOLUTION INTRAVENOUS at 10:20

## 2024-11-12 RX ADMIN — Medication 10 ML: at 09:46

## 2024-11-12 RX ADMIN — EPHEDRINE SULFATE 5 MG: 50 INJECTION INTRAVENOUS at 13:08

## 2024-11-12 RX ADMIN — Medication 10 ML: at 09:45

## 2024-11-12 RX ADMIN — IBUPROFEN 600 MG: 600 TABLET, FILM COATED ORAL at 21:30

## 2024-11-12 RX ADMIN — Medication 10 ML/HR: at 12:29

## 2024-11-12 RX ADMIN — Medication: at 22:15

## 2024-11-12 RX ADMIN — SODIUM CHLORIDE 300 ML: 9 INJECTION, SOLUTION INTRAVENOUS at 13:03

## 2024-11-12 RX ADMIN — LIDOCAINE HYDROCHLORIDE 5 ML: 10 INJECTION, SOLUTION INFILTRATION; PERINEURAL at 12:27

## 2024-11-12 RX ADMIN — Medication 2 MILLI-UNITS/MIN: at 10:20

## 2024-11-12 RX ADMIN — Medication 125 ML/HR: at 18:14

## 2024-11-12 RX ADMIN — EPHEDRINE SULFATE 5 MG: 50 INJECTION INTRAVENOUS at 12:50

## 2024-11-12 NOTE — H&P
[facility]  Obstetric History and Physical    Chief Complaint   Patient presents with    Scheduled Induction     Pt presents for scheduled IOL. Endorses +Fm. Denies VB, LOF. Placed on monitor. VSS.       Subjective     Patient is a 29 y.o. female  currently at 39w5d, who presents for term schedule induction.  Patient notes good fetal movement.  No loss of fluid.    Her prenatal care is complicated by elevated 1 hour with normal 3-hour..  Her previous obstetric/gynecological history is noted for  history of anxiety and depression.  Patient's first daughter was 8 pounds. .    The following portions of the patients history were reviewed and updated as appropriate: past medical history, past surgical history, and problem list .       Prenatal Information:  Prenatal Results       Initial Prenatal Labs       Test Value Reference Range Date Time    Hemoglobin  14.4 g/dL 11.1 - 15.9 24 1508    Hematocrit  42.9 % 34.0 - 46.6 24 1508    Platelets  298 x10E3/uL 150 - 450 24 1508    Rubella IgG  4.74 index Immune >0.99 24 1508    Hepatitis B SAg  Negative  Negative 24 1508    Hepatitis C Ab  Non Reactive  Non Reactive 24 1508    RPR  Non Reactive  Non Reactive 24 1508    T. Pallidum Ab   Non-Reactive  Non-Reactive 24 0947       Non-Reactive  Non-Reactive 10/24/24 2101       Non Reactive  Non Reactive 24 1027    ABO  A   24 0947    Rh  Positive   24 0947    Antibody Screen  Negative  Negative 24 1508    HIV  Non Reactive  Non Reactive 24 1508    Urine Culture  No growth   10/24/24 1918       Final report   24 1630    Gonorrhea  Negative  Negative 24 1627    Chlamydia  Negative  Negative 24 1627    TSH        HgB A1c         Varicella IgG  730 index Immune >165 24 1508    Hemoglobinopathy Fractionation        Hemoglobinopathy (genetic testing)        Cystic fibrosis         Spinal muscular atrophy        Fragile X                   Fetal testing        Test Value Reference Range Date Time    NIPT        MSAFP  *Screen Negative*   06/13/24 1404    AFP-4                  2nd and 3rd Trimester       Test Value Reference Range Date Time    Hemoglobin (repeated)  12.3 g/dL 12.0 - 15.9 11/12/24 0947       11.9 g/dL 12.0 - 15.9 10/24/24 2101       12.8 g/dL 12.0 - 15.9 08/07/24 1027    Hematocrit (repeated)  37.3 % 34.0 - 46.6 11/12/24 0947       35.4 % 34.0 - 46.6 10/24/24 2101       37.1 % 34.0 - 46.6 08/07/24 1027    Platelets   302 10*3/mm3 140 - 450 11/12/24 0947       286 10*3/mm3 140 - 450 10/24/24 2101       246 10*3/mm3 140 - 450 08/07/24 1027       298 x10E3/uL 150 - 450 04/11/24 1508    1 hour GTT   152 mg/dL 65 - 139 08/07/24 1027    Antibody Screen (repeated)  Negative   11/12/24 0947       Negative   10/24/24 2101    3rd TM syphilis scrn (repeated)  RPR         3rd TM syphilis scrn (repeated) TP-Ab  Non-Reactive  Non-Reactive 11/12/24 0947       Non-Reactive  Non-Reactive 10/24/24 2101       Non Reactive  Non Reactive 08/07/24 1027    3rd TM syphilis screen TB-Ab (FTA)  Non-Reactive  Non-Reactive 11/12/24 0947       Non-Reactive  Non-Reactive 10/24/24 2101       Non Reactive  Non Reactive 08/07/24 1027    Syphilis cascade test TP-Ab (EIA)        Syphilis cascade TPPA        GTT Fasting  82 mg/dL 70 - 94 08/19/24 0818    GTT 1 Hr  152 mg/dL 70 - 179 08/19/24 0818    GTT 2 Hr  133 mg/dL 70 - 154 08/19/24 0818    GTT 3 Hr  98 mg/dL 70 - 139 08/19/24 0818    Group B Strep  Negative  Negative 10/17/24               Other testing        Test Value Reference Range Date Time    Parvo IgG         CMV IgG                   Drug Screening       Test Value Reference Range Date Time    Amphetamine Screen        Barbiturate Screen        Benzodiazepine Screen        Methadone Screen        Phencyclidine Screen        Opiates Screen        THC Screen        Cocaine Screen        Propoxyphene Screen        Buprenorphine Screen         Methamphetamine Screen        Oxycodone Screen        Tricyclic Antidepressants Screen                  Legend    ^: Historical                          External Prenatal Results       Pregnancy Outside Results - Transcribed From Office Records - See Scanned Records For Details       Test Value Date Time    ABO  A  11/12/24 0947    Rh  Positive  11/12/24 0947    Antibody Screen  Negative  11/12/24 0947       Negative  10/24/24 2101       Negative  04/11/24 1508    Varicella IgG  730 index 04/11/24 1508    Rubella  4.74 index 04/11/24 1508    Hgb  12.3 g/dL 11/12/24 0947       11.9 g/dL 10/24/24 2101       12.8 g/dL 08/07/24 1027       14.4 g/dL 04/11/24 1508    Hct  37.3 % 11/12/24 0947       35.4 % 10/24/24 2101       37.1 % 08/07/24 1027       42.9 % 04/11/24 1508    HgB A1c        1h GTT  152 mg/dL 08/07/24 1027    3h GTT Fasting  82 mg/dL 08/19/24 0818    3h GTT 1 hour  152 mg/dL 08/19/24 0818    3h GTT 2 hour  133 mg/dL 08/19/24 0818    3h GTT 3 hour   98 mg/dL 08/19/24 0818    Gonorrhea (discrete)  Negative  04/11/24 1627    Chlamydia (discrete)  Negative  04/11/24 1627    RPR  Non Reactive  04/11/24 1508    Syphils cascade: TP-Ab (FTA)  Non-Reactive  11/12/24 0947       Non-Reactive  10/24/24 2101       Non Reactive  08/07/24 1027    TP-Ab  Non-Reactive  11/12/24 0947       Non-Reactive  10/24/24 2101       Non Reactive  08/07/24 1027    TP-Ab (EIA)       TPPA       HBsAg  Negative  04/11/24 1508    Herpes Simplex Virus PCR       Herpes Simplex VIrus Culture       HIV  Non Reactive  04/11/24 1508    Hep C RNA Quant PCR       Hep C Antibody  Non Reactive  04/11/24 1508    AFP  37.2 ng/mL 06/13/24 1404    NIPT       Cystic Fibrosis ([place])       Cystic Fibroisis        Spinal Muscular atrophy       Fragile X       Group B Strep  Negative  10/17/24     GBS Susceptibility to Clindamycin       GBS Susceptibility to Erythromycin       Fetal Fibronectin       Genetic Testing, Maternal Blood                 Drug  Screening       Test Value Date Time    Urine Drug Screen       Amphetamine Screen       Barbiturate Screen       Benzodiazepine Screen       Methadone Screen       Phencyclidine Screen       Opiates Screen       THC Screen       Cocaine Screen       Propoxyphene Screen       Buprenorphine Screen       Methamphetamine Screen       Oxycodone Screen       Tricyclic Antidepressants Screen                 Legend    ^: Historical                             Past OB History:     OB History    Para Term  AB Living   3 1 1 0 1 1   SAB IAB Ectopic Molar Multiple Live Births   0 0 0 0 0 1      # Outcome Date GA Lbr William/2nd Weight Sex Type Anes PTL Lv   3 Current            2 Term 22 39w0d  3690 g (8 lb 2.2 oz) F Vag-Spont EPI N ALICE      Birth Comments: Infnat scale #4      Name: RIA KIRBY      Apgar1: 8  Apgar5: 8   1 AB      TAB          Past Medical History: Past Medical History:   Diagnosis Date    Anxiety and depression 2024    Dysmenorrhea     Migraine     Pregnancy 2024    Raynaud's syndrome       Past Surgical History Past Surgical History:   Procedure Laterality Date    KNEE ACL RECONSTRUCTION Right 2016    WISDOM TOOTH EXTRACTION        Family History: Family History   Problem Relation Age of Onset    Alcohol abuse Father     Anxiety disorder Father     Depression Father     Breast cancer Mother 40    Cancer Mother     Hyperlipidemia Mother     Anxiety disorder Sister     Depression Sister     Liver cancer Paternal Grandfather     Stroke Paternal Grandmother     Endometriosis Maternal Grandmother     Melanoma Maternal Grandmother     Cancer Maternal Grandmother     COPD Maternal Grandmother     Mental illness Maternal Grandmother     Thyroid disease Maternal Grandmother     Stroke Maternal Grandmother     Liver cancer Maternal Grandfather     Ovarian cancer Neg Hx     Uterine cancer Neg Hx     Pancreatic cancer Neg Hx     Congenital heart disease Neg Hx     Cystic  fibrosis Neg Hx     Colon cancer Neg Hx       Social History:  reports that she has never smoked. She has never been exposed to tobacco smoke. She has never used smokeless tobacco.   reports that she does not currently use alcohol after a past usage of about 3.0 standard drinks of alcohol per week.   reports no history of drug use.        General ROS: Pertinent items are noted in HPI    Objective       Vital Signs Range for the last 24 hours  Temperature: Temp:  [98.9 °F (37.2 °C)] 98.9 °F (37.2 °C)   Temp Source: Temp src: Oral   BP: BP: (126-135)/() 126/95   Pulse: Heart Rate:  [113-117] 113   Respirations: Resp:  [16] 16   SPO2: SpO2:  [100 %] 100 %   O2 Amount (l/min):     O2 Devices     Weight: Weight:  [92.8 kg (204 lb 9.6 oz)] 92.8 kg (204 lb 9.6 oz)     Physical Examination: General appearance - alert, well appearing, and in no distress  Mental status - normal mood, behavior, speech, dress, motor activity, and thought processes  Eyes - sclera anicteric  Abdomen -gravid, size slightly greater than dates  Pelvic -cervix is 2 cm posterior 70% and soft.  Artificial rupture membranes reveals clear fluid.  IUPC catheter is placed.    Presentation: vertex   Cervix: Exam by: Method: sterile vaginal exam performed   Dilation: Cervical Dilation (cm): 2   Effacement: Cervical Effacement: 50   Station: Fetal Station: 1-->-2       Fetal Heart Rate Assessment   Method: Fetal HR Assessment Method: external   Beats/min: Fetal HR (beats/min): 140   Baseline: Fetal HR Baseline: normal range   Variability: Fetal HR Variability: moderate (amplitude range 6 to 25 bpm)   Accels: Fetal HR Accelerations: greater than/equal to 15 bpm, lasting at least 15 seconds   Decels: Fetal HR Decelerations: absent   Tracing Category:       Uterine Assessment   Method: Method: external tocotransducer   Frequency (min):     Ctx Count in 10 min:     Duration:     Intensity:     Intensity by IUPC:     Resting Tone:     Resting Tone by IUPC:      Krys Units:       Laboratory Results:   Results from last 7 days   Lab Units 24  0947   WBC 10*3/mm3 7.68   HEMOGLOBIN g/dL 12.3   HEMATOCRIT % 37.3   PLATELETS 10*3/mm3 302           Assessment & Plan       Pregnancy    Anxiety and depression    39 weeks gestation of pregnancy        Assessment:  1.  Intrauterine pregnancy at 39w5d gestation with reactive, reassuring fetal status.    2.  Induction of labor with favorable cervix  3.  Obstetrical history significant for is non-contributory.  4.  GBS status:   Strep Gp B GERRI   Date Value Ref Range Status   10/17/2024 Negative Negative Final     Comment:     Centers for Disease Control and Prevention (CDC) and American Congress  of Obstetricians and Gynecologists (ACOG) guidelines for prevention of   group B streptococcal (GBS) disease specify co-collection of  a vaginal and rectal swab specimen to maximize sensitivity of GBS  detection. Per the CDC and ACOG, swabbing both the lower vagina and  rectum substantially increases the yield of detection compared with  sampling the vagina alone.  Penicillin G, ampicillin, or cefazolin are indicated for intrapartum  prophylaxis of  GBS colonization. Reflex susceptibility  testing should be performed prior to use of clindamycin only on GBS  isolates from penicillin-allergic women who are considered a high risk  for anaphylaxis. Treatment with vancomycin without additional testing  is warranted if resistance to clindamycin is noted.         Plan:  1. fetal and uterine monitoring  continuously and labor augmentation  Pitocin  2. Plan of care has been reviewed with patient and her   3.  Risks, benefits of treatment plan have been discussed.  4.  All questions have been answered.        Nate Hay MD  2024  11:10 EST

## 2024-11-12 NOTE — ANESTHESIA PROCEDURE NOTES
Labor Epidural      Patient reassessed immediately prior to procedure    Patient location during procedure: OB  Performed By  Anesthesiologist: Steve Haro MD  Preanesthetic Checklist  Completed: patient identified, IV checked, site marked, risks and benefits discussed, surgical consent, monitors and equipment checked, pre-op evaluation and timeout performed  Additional Notes  Test dose 4 cc 1% lidocaine with epi.  Second dose 5cc 1% lidocaine then continuous infusion o.2% Ropivicaine with 2 Mics fentanyl per cc at 10 cc hr, with  6cc PCA, 15 min lockout  Prep:  Pt Position:sitting  Sterile Tech:gloves, cap, mask and sterile barrier  Prep:chlorhexidine gluconate and isopropyl alcohol  Monitoring:blood pressure monitoring, continuous pulse oximetry and EKG  Epidural Block Procedure:  Approach:midline  Guidance:landmark technique  Location:L4-L5  Needle Type:Tuohy  Needle Gauge:17 G  Loss of Resistance Medium: air  Paresthesia: none  Aspiration:negative  Test Dose:negative  Number of Attempts: 1  Post Assessment:  Dressing:occlusive dressing applied and secured with tape  Pt Tolerance:patient tolerated the procedure well with no apparent complications  Complications:no

## 2024-11-12 NOTE — L&D DELIVERY NOTE
[facility]   Vaginal Delivery Note    Patient Name: Viry Theodore  : 1995  MRN: [id number]    Date of Delivery: 2024    Diagnosis     Pre & Post-Delivery:  Intrauterine pregnancy at 39w5d  Labor status: Induced Onset of Labor    Pregnancy    Anxiety and depression    39 weeks gestation of pregnancy             Problem List    Transfer to Postpartum     Review the Delivery Report for details.     Delivery     Delivery: Vaginal, Spontaneous    YOB: 2022   Time of Birth:  Gestational Age 5:13 PM  39w5d     Anesthesia: Epidural    Delivering clinician: Nate Hay   Forceps?   No   Vacuum? No    Shoulder dystocia present: Yes Shoulder Dystocia Details  Dystocia Present? Yes  Time head delivered:    Gentle attempt at traction, assisted by maternal expulsive forces: Yes  If no, why:    Anterior shoulder:  Right  Time recognized: 2024  5:11 PM    Time help called: 2024  5:12 PM Called by: shania duval rn   Time provider arrived:   Provider who arrived:     Time NICU arrived:   NICU staff:     Time additional staff arrived: 2024  5:13 PM Additional staff: beba jarquin rn, rigo peters rn, alecia leach rn          Delivery narrative: The patient is a 29-year-old  3 para 1-0-1-1 at 39-5/7 weeks admitted for term induction of labor.  Patient was started on Pitocin.  Amniotomy was done at 2 cm and clear fluid was noted.  Patient received an epidural.  The fetal heart rate tracing did show some variable decelerations and some increased variability so scop electrode was placed and amnioinfusion was started.  Variables did improve but continued intermittently through the rest of the labor.  Patient progressed over normal labor curve.  When she was complete she was prepped and draped for delivery and pushing was started.  Patient pushed with excellent effort.  Fetal head delivered and restituted to the maternal right.  Nuchal cord x 1 was reduced.  Nares and mouth were bulb  "suction.  Shoulders would not deliver with maternal expulsive efforts consistent with shoulder dystocia.  Legs were brought back into Lucas position.  Suprapubic pressure was applied by the nurse.  I swept the posterior left arm across the baby's chest and delivered gently the left arm.  This released the shoulder dystocia and the baby's anterior shoulder delivered.  Baby was placed on mom's abdomen and the cord was clamped and cut.  Baby was taken to the warmer for evaluation.  Gases and cord blood were collected.  Placenta was delivered and noted to be complete.  Pitocin was started per protocol.  Uterine massage was also done.  If there were no lacerations noted on the vagina or perineum.  Baby is doing well is now in Kangaroo care with mom.  Fundus was rechecked and noted to be firm.      Infant     Findings: female infant     Infant observations: Weight: No birth weight on file.  Length:   in  Observations/Comments:  ldr 3 panda     Apgars:   @ 1 minute /      @ 5 minutes   Infant Name: Rashida     Placenta & Cord         Placenta delivered  Expressed at   11/12/2024  5:16 PM    Cord: 3 vessels present.   Nuchal Cord?  yes; Number of nuchal loops present:      Cord blood obtained: Yes   Cord gases obtained:  Yes   Cord gas results: Venous:  No results found for: \"PHCVEN\", \"BECVEN\"    Arterial:  No results found for: \"PHCART\", \"BECART\"     Repair     Episiotomy: None    No    Lacerations: No   Estimated Blood Loss:  300 cc     Quantitative Blood Loss:  See epic record        Complications     Shoulder dystocia    Disposition     Mother to Mother Baby/Postpartum  in stable condition currently.  Baby to remains with mom  in stable condition currently.    Nate Hay MD  11/12/24  17:26 EST        "

## 2024-11-12 NOTE — ANESTHESIA PREPROCEDURE EVALUATION
Anesthesia Evaluation     no history of anesthetic complications:                Airway   Mallampati: II  Dental - normal exam     Pulmonary - normal exam   Cardiovascular - normal exam        Neuro/Psych  (+) headaches, psychiatric history Depression and Anxiety  GI/Hepatic/Renal/Endo      Musculoskeletal     Abdominal    Substance History      OB/GYN    (+) Pregnant    Comment: 39 WK 5 DAYS      Other                    Anesthesia Plan    ASA 2     epidural       Anesthetic plan, risks, benefits, and alternatives have been provided, discussed and informed consent has been obtained with: patient and spouse/significant other.    CODE STATUS:    Level Of Support Discussed With: Patient  Code Status (Patient has no pulse and is not breathing): CPR (Attempt to Resuscitate)  Medical Interventions (Patient has pulse or is breathing): Full Support

## 2024-11-12 NOTE — ANESTHESIA POSTPROCEDURE EVALUATION
"Patient: Viry Theodore    Procedure Summary       Date: 11/12/24 Room / Location:     Anesthesia Start: 1216 Anesthesia Stop: 1713    Procedure: LABOR ANALGESIA Diagnosis:     Scheduled Providers:  Provider: Steve Haro MD    Anesthesia Type: epidural ASA Status: 2            Anesthesia Type: epidural    Vitals  Vitals Value Taken Time   /76 11/12/24 1846   Temp 36.2 °C (97.2 °F) 11/12/24 1531   Pulse 92 11/12/24 1846   Resp 15 11/12/24 1845   SpO2 100 % 11/12/24 1710           Post Anesthesia Care and Evaluation    Patient location during evaluation: bedside  Patient participation: complete - patient participated  Level of consciousness: awake and alert  Pain management: adequate    Airway patency: patent  Anesthetic complications: No anesthetic complications    Cardiovascular status: acceptable  Respiratory status: acceptable  Hydration status: acceptable    Comments: /76 (BP Location: Left arm, Patient Position: Sitting)   Pulse 92   Temp 36.2 °C (97.2 °F) (Oral)   Resp 15   Ht 162.6 cm (64\")   Wt 92.8 kg (204 lb 9.6 oz)   LMP  (LMP Unknown)   SpO2 100%   Breastfeeding Yes   BMI 35.12 kg/m²     "

## 2024-11-12 NOTE — PLAN OF CARE
Problem: Adult Inpatient Plan of Care  Goal: Plan of Care Review  Outcome: Progressing  Flowsheets (Taken 2024 1801)  Progress: improving  Outcome Evaluation: Pt a term induction.  with a shoulder dystocia of [id number]. . No lacerations. Plans to breastfeed. Will transfer to postpartum once recovery has ended.  Plan of Care Reviewed With: patient  Goal: Patient-Specific Goal (Individualized)  Outcome: Progressing  Goal: Absence of Hospital-Acquired Illness or Injury  Outcome: Progressing  Goal: Optimal Comfort and Wellbeing  Outcome: Progressing  Intervention: Provide Person-Centered Care  Recent Flowsheet Documentation  Taken 2024 1530 by Heike Carmichael RN  Trust Relationship/Rapport:   care explained   choices provided   emotional support provided   empathic listening provided   questions answered   questions encouraged   reassurance provided   thoughts/feelings acknowledged  Goal: Readiness for Transition of Care  Outcome: Progressing     Problem: Labor  Goal: Hemostasis  Outcome: Progressing  Goal: Stable Fetal Wellbeing  Outcome: Progressing  Goal: Effective Progression to Delivery  Outcome: Progressing  Goal: Absence of Infection Signs and Symptoms  Outcome: Progressing  Goal: Acceptable Pain Control  Outcome: Progressing  Goal: Normal Uterine Contraction Pattern  Outcome: Progressing     Problem: Skin Injury Risk Increased  Goal: Skin Health and Integrity  Outcome: Progressing     Problem: Anesthesia/Analgesia, Neuraxial  Goal: Safe, Effective Infusion Delivery  Outcome: Progressing  Goal: Stable Patient-Fetal Status  Outcome: Progressing  Goal: Absence of Infection Signs and Symptoms  Outcome: Progressing  Goal: Nausea and Vomiting Relief  Outcome: Progressing  Goal: Optimal Pain Control and Function  Outcome: Progressing  Goal: Effective Oxygenation and Ventilation  Outcome: Progressing  Goal: Baseline Motor Function Return  Outcome: Progressing  Goal: Effective Urinary  Elimination  Outcome: Progressing   Goal Outcome Evaluation:  Plan of Care Reviewed With: patient        Progress: improving  Outcome Evaluation: Pt a term induction.  with a shoulder dystocia of [id number]. . No lacerations. Plans to breastfeed. Will transfer to postpartum once recovery has ended.

## 2024-11-12 NOTE — PROGRESS NOTES
"Patient just received her epidural.  Tracing was noted to have increased variability.    /76   Pulse 88   Temp 98.9 °F (37.2 °C) (Oral)   Resp 16   Ht 162.6 cm (64\")   Wt 92.8 kg (204 lb 9.6 oz)   LMP  (LMP Unknown)   SpO2 100%   Breastfeeding Yes   BMI 35.12 kg/m²   Fetal heart tracing has variable decelerations-category 2  Fetal scalp electrode is placed.  Contractions are every 2 to 4 minutes  Cervix is 4 cm / 80% and -2 station  Fluid is clear      Assessment-variable decelerations    Plan-start amnioinfusion.  "

## 2024-11-13 LAB
BASOPHILS # BLD AUTO: 0.02 10*3/MM3 (ref 0–0.2)
BASOPHILS NFR BLD AUTO: 0.2 % (ref 0–1.5)
DEPRECATED RDW RBC AUTO: 45 FL (ref 37–54)
EOSINOPHIL # BLD AUTO: 0.04 10*3/MM3 (ref 0–0.4)
EOSINOPHIL NFR BLD AUTO: 0.3 % (ref 0.3–6.2)
ERYTHROCYTE [DISTWIDTH] IN BLOOD BY AUTOMATED COUNT: 13.1 % (ref 12.3–15.4)
HCT VFR BLD AUTO: 33.6 % (ref 34–46.6)
HGB BLD-MCNC: 10.8 G/DL (ref 12–15.9)
IMM GRANULOCYTES # BLD AUTO: 0.05 10*3/MM3 (ref 0–0.05)
IMM GRANULOCYTES NFR BLD AUTO: 0.4 % (ref 0–0.5)
LYMPHOCYTES # BLD AUTO: 1.85 10*3/MM3 (ref 0.7–3.1)
LYMPHOCYTES NFR BLD AUTO: 15.9 % (ref 19.6–45.3)
MCH RBC QN AUTO: 30.1 PG (ref 26.6–33)
MCHC RBC AUTO-ENTMCNC: 32.1 G/DL (ref 31.5–35.7)
MCV RBC AUTO: 93.6 FL (ref 79–97)
MONOCYTES # BLD AUTO: 0.89 10*3/MM3 (ref 0.1–0.9)
MONOCYTES NFR BLD AUTO: 7.6 % (ref 5–12)
NEUTROPHILS NFR BLD AUTO: 75.6 % (ref 42.7–76)
NEUTROPHILS NFR BLD AUTO: 8.8 10*3/MM3 (ref 1.7–7)
NRBC BLD AUTO-RTO: 0 /100 WBC (ref 0–0.2)
PLATELET # BLD AUTO: 222 10*3/MM3 (ref 140–450)
PMV BLD AUTO: 11.2 FL (ref 6–12)
RBC # BLD AUTO: 3.59 10*6/MM3 (ref 3.77–5.28)
WBC NRBC COR # BLD AUTO: 11.65 10*3/MM3 (ref 3.4–10.8)

## 2024-11-13 PROCEDURE — 85025 COMPLETE CBC W/AUTO DIFF WBC: CPT | Performed by: OBSTETRICS & GYNECOLOGY

## 2024-11-13 RX ADMIN — IBUPROFEN 600 MG: 600 TABLET, FILM COATED ORAL at 20:16

## 2024-11-13 RX ADMIN — DOCUSATE SODIUM 100 MG: 100 CAPSULE, LIQUID FILLED ORAL at 09:15

## 2024-11-13 RX ADMIN — DOCUSATE SODIUM 100 MG: 100 CAPSULE, LIQUID FILLED ORAL at 20:14

## 2024-11-13 RX ADMIN — IBUPROFEN 600 MG: 600 TABLET, FILM COATED ORAL at 09:15

## 2024-11-13 NOTE — PLAN OF CARE
Goal Outcome Evaluation:              Outcome Evaluation: VSS. Voiding spontaneously, ambulating independently, pain adeqautely controlled. Breastfeeding and bonding well with infant.

## 2024-11-13 NOTE — PLAN OF CARE
Goal Outcome Evaluation:              Outcome Evaluation: vss, pain well controlled,

## 2024-11-13 NOTE — PROGRESS NOTES
"[facility]  Vaginal Delivery Progress Note    Subjective   Postpartum Day 1: Vaginal Delivery    The patient feels well.  Her pain is well controlled.  She is ambulating well.  Patient describes her bleeding as staining only.    Breastfeeding: infant latching.    ROS:  Neuro: neg for headache  Pulm: neg for soa  CV: neg for chest pain  : neg for heavy bleeding  Musculoskeletal: neg for leg pain    Objective     Vital Signs Range for the last 24 hours  Temperature: Temp:  [97.2 °F (36.2 °C)-98.1 °F (36.7 °C)] 97.8 °F (36.6 °C)   Temp Source: Temp src: Oral   BP: BP: ()/(55-97) 130/84   Pulse: Heart Rate:  [] 91   Respirations: Resp:  [14-16] 16   SPO2: SpO2:  [99 %-100 %] 100 %   O2 Amount (l/min):     O2 Devices     Weight:       Admit Height:  Height: 162.6 cm (64\")      Physical Exam:  General:  no acute distress.  Abdomen: Fundus: appropriate, firm, non tender  Extremities: Bilateral lower ext with trace edema, no cords or tenderness       Lab results reviewed:    Lab Results   Component Value Date    WBC 11.65 (H) 11/13/2024    HGB 10.8 (L) 11/13/2024    HCT 33.6 (L) 11/13/2024    MCV 93.6 11/13/2024     11/13/2024     Rubella:  Transcribed from prenatal record --    Rubella Antibodies, IgG   Date Value Ref Range Status   04/11/2024 4.74 Immune >0.99 index Final     Comment:                                     Non-immune       <0.90                                  Equivocal  0.90 - 0.99                                  Immune           >0.99       Rh Status:    RH type   Date Value Ref Range Status   11/12/2024 Positive  Final     Rh Factor   Date Value Ref Range Status   04/11/2024 Positive  Final     Comment:     Please note: Prior records for this patient's ABO / Rh type are not  available for additional verification.       Immunizations:   Immunization History   Administered Date(s) Administered    ABRYSVO (RSV, 60+ or pregnant women 32-36 wks) 09/26/2024    COVID-19 (PFIZER) Purple " Cap Monovalent 12/17/2020, 01/07/2021    DTaP 1997, 1997, 1997, 02/02/1999, 08/17/2001    FluMist 2-49yrs (Nasal) 10/01/2009    Fluzone  >6mos 09/26/2024    Fluzone (or Fluarix & Flulaval for VFC) >6mos 10/02/2019    HPV Quadrivalent 05/30/2008, 10/08/2008, 08/02/2011    Hep A, 2 Dose 05/30/2008, 08/02/2011    Hep B, Adolescent or Pediatric 1997, 1997    Hib (PRP-OMP) 1997, 1997, 1997, 02/02/1999    IPV 1997, 1997, 02/02/1999, 08/17/2001    MMR 02/02/1999, 08/17/2001    Meningococcal MCV4P (Menactra) 05/30/2008, 08/11/2015    PEDS-Pneumococcal Conjugate (PCV7) 06/07/2000    PPD Test 02/05/2015, 08/17/2015, 09/16/2020    Tdap 05/30/2008, 10/10/2018, 09/04/2024    Varicella 05/29/1998, 05/30/2008       Assessment & Plan       Pregnancy    Anxiety and depression    39 weeks gestation of pregnancy    Shoulder dystocia during labor and delivery    Vaginal delivery      Viry Gertrudewinsome Theodore is Day 1  post-partum  Vaginal, Spontaneous  : pt denies any issues today      Plan:  Continue current care.      Radha Peraza MD  11/13/2024  14:23 EST

## 2024-11-13 NOTE — LACTATION NOTE
This note was copied from a baby's chart.  P2, T baby-new admission. Mom BF her 1-st child for 6 months. Mom reports baby is BF well so far. Encouraged her to try BF baby every 2-3 h. or PRN.  Mom already ordered PBP. She denies any questions. Encouraged to call if needing help.

## 2024-11-13 NOTE — LACTATION NOTE
This note was copied from a baby's chart.  P2 term baby, 17 hrs old. Mom reports breast feeding is going well and baby has had several wet and stool diapers. Baby sleeping now.   Reviewed milk production, how to know baby is getting enough milk and encouraged to call for any assistance.

## 2024-11-14 VITALS
WEIGHT: 204.6 LBS | OXYGEN SATURATION: 100 % | DIASTOLIC BLOOD PRESSURE: 85 MMHG | RESPIRATION RATE: 16 BRPM | SYSTOLIC BLOOD PRESSURE: 130 MMHG | BODY MASS INDEX: 34.93 KG/M2 | HEIGHT: 64 IN | HEART RATE: 85 BPM | TEMPERATURE: 98 F

## 2024-11-14 PROCEDURE — 0503F POSTPARTUM CARE VISIT: CPT | Performed by: OBSTETRICS & GYNECOLOGY

## 2024-11-14 RX ORDER — IBUPROFEN 600 MG/1
600 TABLET, FILM COATED ORAL EVERY 6 HOURS PRN
Qty: 30 TABLET | Refills: 1 | Status: SHIPPED | OUTPATIENT
Start: 2024-11-14

## 2024-11-14 RX ADMIN — IBUPROFEN 600 MG: 600 TABLET, FILM COATED ORAL at 08:40

## 2024-11-14 RX ADMIN — DOCUSATE SODIUM 100 MG: 100 CAPSULE, LIQUID FILLED ORAL at 08:40

## 2024-11-14 NOTE — PLAN OF CARE
Problem: Adult Inpatient Plan of Care  Goal: Plan of Care Review  Outcome: Progressing  Flowsheets (Taken 11/14/2024 0659)  Progress: improving  Outcome Evaluation: vss, bleeding to a minimum, voiding spontaneously, pain well controlled, ambulating well  Plan of Care Reviewed With: patient  Goal: Patient-Specific Goal (Individualized)  Outcome: Progressing  Goal: Absence of Hospital-Acquired Illness or Injury  Outcome: Progressing  Intervention: Identify and Manage Fall Risk  Recent Flowsheet Documentation  Taken 11/14/2024 0621 by Renate Ruiz RN  Safety Promotion/Fall Prevention: safety round/check completed  Taken 11/14/2024 0400 by Renate Ruiz RN  Safety Promotion/Fall Prevention: safety round/check completed  Taken 11/14/2024 0346 by Renate Ruiz RN  Safety Promotion/Fall Prevention: safety round/check completed  Taken 11/14/2024 0230 by Renate Ruiz RN  Safety Promotion/Fall Prevention: safety round/check completed  Taken 11/13/2024 2256 by Renate Ruiz RN  Safety Promotion/Fall Prevention: safety round/check completed  Taken 11/13/2024 2016 by Renate Ruiz RN  Safety Promotion/Fall Prevention: safety round/check completed  Intervention: Prevent Skin Injury  Recent Flowsheet Documentation  Taken 11/13/2024 2016 by Renate Ruiz RN  Body Position: position changed independently  Goal: Optimal Comfort and Wellbeing  Outcome: Progressing  Intervention: Monitor Pain and Promote Comfort  Recent Flowsheet Documentation  Taken 11/13/2024 2016 by Renate Ruiz RN  Pain Management Interventions: pain medication given  Intervention: Provide Person-Centered Care  Recent Flowsheet Documentation  Taken 11/13/2024 2016 by Renate Ruiz RN  Trust Relationship/Rapport:   care explained   choices provided  Goal: Readiness for Transition of Care  Outcome: Progressing     Problem: Labor  Goal: Hemostasis  Outcome: Progressing  Goal: Stable Fetal Wellbeing  Outcome: Progressing  Intervention: Promote  and Monitor Fetal Wellbeing  Recent Flowsheet Documentation  Taken 11/13/2024 2016 by Renate Ruiz RN  Body Position: position changed independently  Goal: Effective Progression to Delivery  Outcome: Progressing  Goal: Absence of Infection Signs and Symptoms  Outcome: Progressing  Goal: Acceptable Pain Control  Outcome: Progressing  Goal: Normal Uterine Contraction Pattern  Outcome: Progressing     Problem: Skin Injury Risk Increased  Goal: Skin Health and Integrity  Outcome: Progressing  Intervention: Optimize Skin Protection  Recent Flowsheet Documentation  Taken 11/13/2024 2016 by Renate Ruiz RN  Activity Management: up ad tootie     Problem: Anesthesia/Analgesia, Neuraxial  Goal: Safe, Effective Infusion Delivery  Outcome: Progressing  Goal: Stable Patient-Fetal Status  Outcome: Progressing  Goal: Absence of Infection Signs and Symptoms  Outcome: Progressing  Goal: Nausea and Vomiting Relief  Outcome: Progressing  Goal: Optimal Pain Control and Function  Outcome: Progressing  Intervention: Prevent or Manage Pain  Recent Flowsheet Documentation  Taken 11/13/2024 2016 by Renate Ruiz RN  Pain Management Interventions: pain medication given  Goal: Effective Oxygenation and Ventilation  Outcome: Progressing  Intervention: Optimize Oxygenation and Ventilation  Recent Flowsheet Documentation  Taken 11/13/2024 2016 by Renate Ruiz RN  Body Position: position changed independently  Goal: Baseline Motor Function Return  Outcome: Progressing  Intervention: Optimize Sensorimotor Function and Safety  Recent Flowsheet Documentation  Taken 11/14/2024 0621 by Renate Ruiz RN  Safety Promotion/Fall Prevention: safety round/check completed  Taken 11/14/2024 0400 by Renate Ruiz RN  Safety Promotion/Fall Prevention: safety round/check completed  Taken 11/14/2024 0346 by Renate Ruiz RN  Safety Promotion/Fall Prevention: safety round/check completed  Taken 11/14/2024 0230 by Renate Ruiz RN  Safety  Promotion/Fall Prevention: safety round/check completed  Taken 11/13/2024 2256 by Renate Ruiz, RN  Safety Promotion/Fall Prevention: safety round/check completed  Taken 11/13/2024 2016 by Renate Ruiz, RN  Safety Promotion/Fall Prevention: safety round/check completed  Goal: Effective Urinary Elimination  Outcome: Progressing   Goal Outcome Evaluation:  Plan of Care Reviewed With: patient        Progress: improving  Outcome Evaluation: vss, bleeding to a minimum, voiding spontaneously, pain well controlled, ambulating well

## 2024-11-14 NOTE — PROGRESS NOTES
[facility]   Obstetrics and Gynecology     2024    Name:Viry Theodore   MR#:[id number]    Vaginal Delivery Progress Note    HD#2    Subjective   Postpartum Day 1: 29 y.o. female  delivered at 39w5d  delivered a female infant.     The patient feels well.  Her pain is controlled.    She is ambulating well.  Patient describes her bleeding as thin lochia.    Breastfeeding/bottle:  The patient is currently breastfeeding.    Patient Active Problem List   Diagnosis    Anxiety and depression    39 weeks gestation of pregnancy    At high risk for breast cancer    Abnormal glucose tolerance test (GTT)    Pregnancy    Shoulder dystocia during labor and delivery    Vaginal delivery       Objective   Vital Signs Range for the last 24 hours  Temp: Temp:  [97.8 °F (36.6 °C)-98.5 °F (36.9 °C)] 98 °F (36.7 °C) Temp src: Oral   BP: BP: (106-138)/(66-85) 130/85        Pulse: Heart Rate:  [65-98] 85  RR: Resp:  [16] 16  Weight: 92.8 kg (204 lb 9.6 oz)  BMI:  Body mass index is 35.12 kg/m².    Results from last 7 days   Lab Units 24  0539 24  0947   WBC 10*3/mm3 11.65* 7.68   HEMOGLOBIN g/dL 10.8* 12.3   HEMATOCRIT % 33.6* 37.3   PLATELETS 10*3/mm3 222 302     Results from last 7 days   Lab Units 24  0947   SODIUM mmol/L 137   POTASSIUM mmol/L 4.1   CHLORIDE mmol/L 106   CO2 mmol/L 19.2*   BUN mg/dL 6   CREATININE mg/dL 0.60   CALCIUM mg/dL 8.6   ALK PHOS U/L 157*   ALT (SGPT) U/L 13   AST (SGOT) U/L 20   GLUCOSE mg/dL 83       Physical Exam  Vitals and nursing note reviewed.   Constitutional:       General: She is not in acute distress.     Appearance: Normal appearance. She is well-developed. She is not ill-appearing.   HENT:      Head: Normocephalic.   Pulmonary:      Effort: Pulmonary effort is normal.   Abdominal:      General: Abdomen is flat. There is no distension.      Palpations: Abdomen is soft. There is no mass.      Tenderness: There is no abdominal tenderness.    Genitourinary:     Vagina: Normal.      Comments: Lochia is scant  Fundus is firm   Musculoskeletal:         General: No swelling or tenderness.      Comments: No calf tenderness or swelling    Neurological:      Mental Status: She is alert and oriented to person, place, and time.   Psychiatric:         Behavior: Behavior normal.         Thought Content: Thought content normal.         Judgment: Judgment normal.         Assessment/Plan   1.  PPD# 1    Pregnancy    Anxiety and depression    39 weeks gestation of pregnancy    Shoulder dystocia during labor and delivery    Vaginal delivery      Plan:   Discharge today     Melvin Barnard MD  11/14/2024 09:46 EST

## 2024-11-14 NOTE — LACTATION NOTE
This note was copied from a baby's chart.  PT is going home today. Reports baby is BF well. Informed her about the [facility] info on the back of the edicational booklet. Discussed engourgement, pumping, milk storage and when to expect mature milk. PT denieis any questions.

## 2024-11-15 NOTE — DISCHARGE SUMMARY
[facility]   Obstetrics and Gynecology   Vaginal delivery Discharge Summary      Date of Admission: 2024    Date of Discharge:  2024      Patient: Viry Theodore      MR#:[id number]    Surgeon/OB: Nate Hay    Discharge Diagnosis:   Vaginal Delivery at 39w5d, uncomplicated recovery    Pregnancy    Anxiety and depression    39 weeks gestation of pregnancy    Shoulder dystocia during labor and delivery    Vaginal delivery      Procedures:  Vaginal, Spontaneous    2024   5:13 PM     Anesthesia:  Epidural    Hospital Course  Patient is a 29 y.o. female  at 39w5d status post vaginal delivery.    Uneventful recovery.  Patient is ambulating, tolerating a regular diet.  Perineum is intact.    Infant:   female fetus 4390 g (9 lb 10.9 oz) with Apgar scores of 2 , 9  at five minutes.    Condition on Discharge:  Stable    Vital Signs  Temp:  [98 °F (36.7 °C)] 98 °F (36.7 °C)  Heart Rate:  [85] 85  Resp:  [16] 16  BP: (130)/(85) 130/85    Results from last 7 days   Lab Units 24  0539 24  0947   WBC 10*3/mm3 11.65* 7.68   HEMOGLOBIN g/dL 10.8* 12.3   HEMATOCRIT % 33.6* 37.3   PLATELETS 10*3/mm3 222 302     Results from last 7 days   Lab Units 24  0947   SODIUM mmol/L 137   POTASSIUM mmol/L 4.1   CHLORIDE mmol/L 106   CO2 mmol/L 19.2*   BUN mg/dL 6   CREATININE mg/dL 0.60   CALCIUM mg/dL 8.6   BILIRUBIN mg/dL 0.3   ALK PHOS U/L 157*   ALT (SGPT) U/L 13   AST (SGOT) U/L 20   GLUCOSE mg/dL 83       Discharge Disposition  Home or Self Care    Discharge Medications     Discharge Medications        New Medications        Instructions Start Date   ibuprofen 600 MG tablet  Commonly known as: ADVIL,MOTRIN   600 mg, Oral, Every 6 Hours PRN             Continue These Medications        Instructions Start Date   prenatal (CLASSIC) vitamin 28-0.8 MG tablet tablet  Generic drug: prenatal vitamin   Daily             Stop These Medications      ondansetron 4 MG tablet  Commonly  known as: Zofran              Discharge Diet: Regular    Activity at Discharge:   Activity Instructions       Pelvic Rest              Follow-up Appointments  Future Appointments   Date Time Provider Department Center   12/5/2024 11:45 AM Autumn Ambrose MD [facility]  [facility]     Additional Instructions for the Follow-ups that You Need to Schedule       Call MD for problems / concerns.   As directed      Call for problems with:   1.  Heavy bleeding:  Greater than a pad an hour for more than 2 hours  2.  Fever greater than 101.3   3.  Redness of the episiotomy or vaginal laceration and/or severe pain increased from discharge pain.    4.  Signs of postpartum preeclampsia:  headache, visual changes, elevated blood pressure    Order Comments: Call for problems with: 1.  Heavy bleeding:  Greater than a pad an hour for more than 2 hours 2.  Fever greater than 101.3 3.  Redness of the episiotomy or vaginal laceration and/or severe pain increased from discharge pain.  4.  Signs of postpartum preeclampsia:  headache, visual changes, elevated blood pressure                   Prenatal labs/vax:   Immunization History   Administered Date(s) Administered    ABRYSVO (RSV, 60+ or pregnant women 32-36 wks) 09/26/2024    COVID-19 (PFIZER) Purple Cap Monovalent 12/17/2020, 01/07/2021    DTaP 1997, 1997, 1997, 02/02/1999, 08/17/2001    FluMist 2-49yrs (Nasal) 10/01/2009    Fluzone  >6mos 09/26/2024    Fluzone (or Fluarix & Flulaval for VFC) >6mos 10/02/2019    HPV Quadrivalent 05/30/2008, 10/08/2008, 08/02/2011    Hep A, 2 Dose 05/30/2008, 08/02/2011    Hep B, Adolescent or Pediatric 1997, 1997    Hib (PRP-OMP) 1997, 1997, 1997, 02/02/1999    IPV 1997, 1997, 02/02/1999, 08/17/2001    MMR 02/02/1999, 08/17/2001    Meningococcal MCV4P (Menactra) 05/30/2008, 08/11/2015    PEDS-Pneumococcal Conjugate (PCV7) 06/07/2000    PPD Test 02/05/2015, 08/17/2015, 09/16/2020    Tdap  05/30/2008, 10/10/2018, 09/04/2024    Varicella 05/29/1998, 05/30/2008       External Prenatal Results       Pregnancy Outside Results - Transcribed From Office Records - See Scanned Records For Details       Test Value Date Time    ABO  A  11/12/24 0947    Rh  Positive  11/12/24 0947    Antibody Screen  Negative  11/12/24 0947       Negative  10/24/24 2101       Negative  04/11/24 1508    Varicella IgG  730 index 04/11/24 1508    Rubella  4.74 index 04/11/24 1508    Hgb  10.8 g/dL 11/13/24 0539       12.3 g/dL 11/12/24 0947       11.9 g/dL 10/24/24 2101       12.8 g/dL 08/07/24 1027       14.4 g/dL 04/11/24 1508    Hct  33.6 % 11/13/24 0539       37.3 % 11/12/24 0947       35.4 % 10/24/24 2101       37.1 % 08/07/24 1027       42.9 % 04/11/24 1508    HgB A1c        1h GTT  152 mg/dL 08/07/24 1027    3h GTT Fasting  82 mg/dL 08/19/24 0818    3h GTT 1 hour  152 mg/dL 08/19/24 0818    3h GTT 2 hour  133 mg/dL 08/19/24 0818    3h GTT 3 hour   98 mg/dL 08/19/24 0818    Gonorrhea (discrete)  Negative  04/11/24 1627    Chlamydia (discrete)  Negative  04/11/24 1627    RPR  Non Reactive  04/11/24 1508    Syphils cascade: TP-Ab (FTA)  Non-Reactive  11/12/24 0947       Non-Reactive  10/24/24 2101       Non Reactive  08/07/24 1027    TP-Ab  Non-Reactive  11/12/24 0947       Non-Reactive  10/24/24 2101       Non Reactive  08/07/24 1027    TP-Ab (EIA)       TPPA       HBsAg  Negative  04/11/24 1508    Herpes Simplex Virus PCR       Herpes Simplex VIrus Culture       HIV  Non Reactive  04/11/24 1508    Hep C RNA Quant PCR       Hep C Antibody  Non Reactive  04/11/24 1508    AFP  37.2 ng/mL 06/13/24 1404    NIPT       Cystic Fibrosis ([place])       Cystic Fibroisis        Spinal Muscular atrophy       Fragile X       Group B Strep  Negative  10/17/24     GBS Susceptibility to Clindamycin       GBS Susceptibility to Erythromycin       Fetal Fibronectin       Genetic Testing, Maternal Blood                 Drug Screening       Test  Value Date Time    Urine Drug Screen       Amphetamine Screen       Barbiturate Screen       Benzodiazepine Screen       Methadone Screen       Phencyclidine Screen       Opiates Screen       THC Screen       Cocaine Screen       Propoxyphene Screen       Buprenorphine Screen       Methamphetamine Screen       Oxycodone Screen       Tricyclic Antidepressants Screen                 Legend    ^: Historical                              Melvin Barnard MD  11/15/24  07:21 EST

## 2024-11-18 NOTE — PAYOR COMM NOTE
"Arben Saumyanoy Loredo (29 y.o. Female)       Date of Birth   1995    Social Security Number       Address   [address] [id number]    Home Phone   [phone]    MRN   [id number]       Muslim   Oriental orthodox    Marital Status                               Admission Date   11/12/24    Admission Type   Elective    Admitting Provider   Nate Hay MD    Attending Provider       Department, Room/Bed   [address], E358/1       Discharge Date   11/14/2024    Discharge Disposition   Home or Self Care    Discharge Destination                                 Attending Provider: (none)   Allergies: No Known Allergies    Isolation: None   Infection: None   Code Status: Prior    Ht: 162.6 cm (64\")   Wt: 92.8 kg (204 lb 9.6 oz)    Admission Cmt: None   Principal Problem: Pregnancy [Z34.90]                   Active Insurance as of 11/12/2024       Primary Coverage       Payor Plan Insurance Group Employer/Plan Group    [employer] BLUE [employer]OSS [employer] BLUE [employer] PPO [id number]       Payor Plan Address Payor Plan Phone Number Payor Plan Fax Number Effective Dates    [address] [phone]  1/1/2024 - None Entered    [place] [address]         Subscriber Name Subscriber Birth Date Member ID       SAUMYA KIRBY 1995 [id number]                     Emergency Contacts        (Rel.) Home Phone Work Phone Mobile Phone    DENNYS GORDON (Spouse) [phone] -- --    ArbenKerry (Mother) -- -- [phone]              Insurance Information                  [employer] PPO Phone: [phone]    Subscriber: Saumya Kirby Subscriber#: [id number]    Group#: [id number] Precert#: --    Authorization#: [id number] Effective Date: --          Problem List             Codes Noted - Resolved       Hospital    Shoulder dystocia during labor and delivery ICD-10-CM: O66.0  ICD-9-CM: 660.40 11/13/2024 - " Present    Vaginal delivery ICD-10-CM: O80  ICD-9-CM: 650 2024 - Present    * (Principal) Pregnancy ICD-10-CM: Z34.90  ICD-9-CM: V22.2 2024 - Present    39 weeks gestation of pregnancy ICD-10-CM: Z3A.39  ICD-9-CM: V22.2 2024 - Present    Anxiety and depression ICD-10-CM: F41.9, F32.A  ICD-9-CM: 300.00, 311 3/7/2024 - Present       Non-Hospital    Abnormal glucose tolerance test (GTT) ICD-10-CM: R73.09  ICD-9-CM: 790.22 2024 - Present    At high risk for breast cancer ICD-10-CM: Z91.89  ICD-9-CM: V49.89 2024 - Present        History & Physical        Nate Hay MD at 24 1110          [facility]  Obstetric History and Physical    Chief Complaint   Patient presents with    Scheduled Induction     Pt presents for scheduled IOL. Endorses +Fm. Denies VB, LOF. Placed on monitor. VSS.       Subjective    Patient is a 29 y.o. female  currently at 39w5d, who presents for term schedule induction.  Patient notes good fetal movement.  No loss of fluid.    Her prenatal care is complicated by elevated 1 hour with normal 3-hour..  Her previous obstetric/gynecological history is noted for  history of anxiety and depression.  Patient's first daughter was 8 pounds. .    The following portions of the patients history were reviewed and updated as appropriate: past medical history, past surgical history, and problem list .       Prenatal Information:  Prenatal Results       Initial Prenatal Labs       Test Value Reference Range Date Time    Hemoglobin  14.4 g/dL 11.1 - 15.9 24 1508    Hematocrit  42.9 % 34.0 - 46.6 24 1508    Platelets  298 x10E3/uL 150 - 450 24 1508    Rubella IgG  4.74 index Immune >0.99 24 1508    Hepatitis B SAg  Negative  Negative 24 1508    Hepatitis C Ab  Non Reactive  Non Reactive 24 1508    RPR  Non Reactive  Non Reactive 24 1508    T. Pallidum Ab   Non-Reactive  Non-Reactive 24 0947       Non-Reactive  Non-Reactive  10/24/24 2101       Non Reactive  Non Reactive 08/07/24 1027    ABO  A   11/12/24 0947    Rh  Positive   11/12/24 0947    Antibody Screen  Negative  Negative 04/11/24 1508    HIV  Non Reactive  Non Reactive 04/11/24 1508    Urine Culture  No growth   10/24/24 1918       Final report   04/11/24 1630    Gonorrhea  Negative  Negative 04/11/24 1627    Chlamydia  Negative  Negative 04/11/24 1627    TSH        HgB A1c         Varicella IgG  730 index Immune >165 04/11/24 1508    Hemoglobinopathy Fractionation        Hemoglobinopathy (genetic testing)        Cystic fibrosis         Spinal muscular atrophy        Fragile X                  Fetal testing        Test Value Reference Range Date Time    NIPT        MSAFP  *Screen Negative*   06/13/24 1404    AFP-4                  2nd and 3rd Trimester       Test Value Reference Range Date Time    Hemoglobin (repeated)  12.3 g/dL 12.0 - 15.9 11/12/24 0947       11.9 g/dL 12.0 - 15.9 10/24/24 2101       12.8 g/dL 12.0 - 15.9 08/07/24 1027    Hematocrit (repeated)  37.3 % 34.0 - 46.6 11/12/24 0947       35.4 % 34.0 - 46.6 10/24/24 2101       37.1 % 34.0 - 46.6 08/07/24 1027    Platelets   302 10*3/mm3 140 - 450 11/12/24 0947       286 10*3/mm3 140 - 450 10/24/24 2101       246 10*3/mm3 140 - 450 08/07/24 1027       298 x10E3/uL 150 - 450 04/11/24 1508    1 hour GTT   152 mg/dL 65 - 139 08/07/24 1027    Antibody Screen (repeated)  Negative   11/12/24 0947       Negative   10/24/24 2101    3rd TM syphilis scrn (repeated)  RPR         3rd TM syphilis scrn (repeated) TP-Ab  Non-Reactive  Non-Reactive 11/12/24 0947       Non-Reactive  Non-Reactive 10/24/24 2101       Non Reactive  Non Reactive 08/07/24 1027    3rd TM syphilis screen TB-Ab (FTA)  Non-Reactive  Non-Reactive 11/12/24 0947       Non-Reactive  Non-Reactive 10/24/24 2101       Non Reactive  Non Reactive 08/07/24 1027    Syphilis cascade test TP-Ab (EIA)        Syphilis cascade TPPA        GTT Fasting  82 mg/dL 70 - 94  08/19/24 0818    GTT 1 Hr  152 mg/dL 70 - 179 08/19/24 0818    GTT 2 Hr  133 mg/dL 70 - 154 08/19/24 0818    GTT 3 Hr  98 mg/dL 70 - 139 08/19/24 0818    Group B Strep  Negative  Negative 10/17/24               Other testing        Test Value Reference Range Date Time    Parvo IgG         CMV IgG                   Drug Screening       Test Value Reference Range Date Time    Amphetamine Screen        Barbiturate Screen        Benzodiazepine Screen        Methadone Screen        Phencyclidine Screen        Opiates Screen        THC Screen        Cocaine Screen        Propoxyphene Screen        Buprenorphine Screen        Methamphetamine Screen        Oxycodone Screen        Tricyclic Antidepressants Screen                  Legend    ^: Historical                          External Prenatal Results       Pregnancy Outside Results - Transcribed From Office Records - See Scanned Records For Details       Test Value Date Time    ABO  A  11/12/24 0947    Rh  Positive  11/12/24 0947    Antibody Screen  Negative  11/12/24 0947       Negative  10/24/24 2101       Negative  04/11/24 1508    Varicella IgG  730 index 04/11/24 1508    Rubella  4.74 index 04/11/24 1508    Hgb  12.3 g/dL 11/12/24 0947       11.9 g/dL 10/24/24 2101       12.8 g/dL 08/07/24 1027       14.4 g/dL 04/11/24 1508    Hct  37.3 % 11/12/24 0947       35.4 % 10/24/24 2101       37.1 % 08/07/24 1027       42.9 % 04/11/24 1508    HgB A1c        1h GTT  152 mg/dL 08/07/24 1027    3h GTT Fasting  82 mg/dL 08/19/24 0818    3h GTT 1 hour  152 mg/dL 08/19/24 0818    3h GTT 2 hour  133 mg/dL 08/19/24 0818    3h GTT 3 hour   98 mg/dL 08/19/24 0818    Gonorrhea (discrete)  Negative  04/11/24 1627    Chlamydia (discrete)  Negative  04/11/24 1627    RPR  Non Reactive  04/11/24 1508    Syphils cascade: TP-Ab (FTA)  Non-Reactive  11/12/24 0947       Non-Reactive  10/24/24 2101       Non Reactive  08/07/24 1027    TP-Ab  Non-Reactive  11/12/24 0947       Non-Reactive   10/24/24 2101       Non Reactive  24 1027    TP-Ab (EIA)       TPPA       HBsAg  Negative  24 1508    Herpes Simplex Virus PCR       Herpes Simplex VIrus Culture       HIV  Non Reactive  24 1508    Hep C RNA Quant PCR       Hep C Antibody  Non Reactive  24 1508    AFP  37.2 ng/mL 24 1404    NIPT       Cystic Fibrosis ([place])       Cystic Fibroisis        Spinal Muscular atrophy       Fragile X       Group B Strep  Negative  10/17/24     GBS Susceptibility to Clindamycin       GBS Susceptibility to Erythromycin       Fetal Fibronectin       Genetic Testing, Maternal Blood                 Drug Screening       Test Value Date Time    Urine Drug Screen       Amphetamine Screen       Barbiturate Screen       Benzodiazepine Screen       Methadone Screen       Phencyclidine Screen       Opiates Screen       THC Screen       Cocaine Screen       Propoxyphene Screen       Buprenorphine Screen       Methamphetamine Screen       Oxycodone Screen       Tricyclic Antidepressants Screen                 Legend    ^: Historical                             Past OB History:     OB History    Para Term  AB Living   3 1 1 0 1 1   SAB IAB Ectopic Molar Multiple Live Births   0 0 0 0 0 1      # Outcome Date GA Lbr William/2nd Weight Sex Type Anes PTL Lv   3 Current            2 Term 24 39w0d  3690 g (8 lb 2.2 oz) F Vag-Spont EPI N ALICE      Birth Comments: Infnat scale #4      Name: BENSON KIRBYLYNN      Apgar1: 8  Apgar5: 8   1 AB      TAB          Past Medical History: Past Medical History:   Diagnosis Date    Anxiety and depression 2024    Dysmenorrhea     Migraine     Pregnancy 2024    Raynaud's syndrome       Past Surgical History Past Surgical History:   Procedure Laterality Date    KNEE ACL RECONSTRUCTION Right 2016    WISDOM TOOTH EXTRACTION  2013      Family History: Family History   Problem Relation Age of Onset    Alcohol abuse Father     Anxiety disorder Father      Depression Father     Breast cancer Mother 40    Cancer Mother     Hyperlipidemia Mother     Anxiety disorder Sister     Depression Sister     Liver cancer Paternal Grandfather     Stroke Paternal Grandmother     Endometriosis Maternal Grandmother     Melanoma Maternal Grandmother     Cancer Maternal Grandmother     COPD Maternal Grandmother     Mental illness Maternal Grandmother     Thyroid disease Maternal Grandmother     Stroke Maternal Grandmother     Liver cancer Maternal Grandfather     Ovarian cancer Neg Hx     Uterine cancer Neg Hx     Pancreatic cancer Neg Hx     Congenital heart disease Neg Hx     Cystic fibrosis Neg Hx     Colon cancer Neg Hx       Social History:  reports that she has never smoked. She has never been exposed to tobacco smoke. She has never used smokeless tobacco.   reports that she does not currently use alcohol after a past usage of about 3.0 standard drinks of alcohol per week.   reports no history of drug use.        General ROS: Pertinent items are noted in HPI    Objective      Vital Signs Range for the last 24 hours  Temperature: Temp:  [98.9 °F (37.2 °C)] 98.9 °F (37.2 °C)   Temp Source: Temp src: Oral   BP: BP: (126-135)/() 126/95   Pulse: Heart Rate:  [113-117] 113   Respirations: Resp:  [16] 16   SPO2: SpO2:  [100 %] 100 %   O2 Amount (l/min):     O2 Devices     Weight: Weight:  [92.8 kg (204 lb 9.6 oz)] 92.8 kg (204 lb 9.6 oz)     Physical Examination: General appearance - alert, well appearing, and in no distress  Mental status - normal mood, behavior, speech, dress, motor activity, and thought processes  Eyes - sclera anicteric  Abdomen -gravid, size slightly greater than dates  Pelvic -cervix is 2 cm posterior 70% and soft.  Artificial rupture membranes reveals clear fluid.  IUPC catheter is placed.    Presentation: vertex   Cervix: Exam by: Method: sterile vaginal exam performed   Dilation: Cervical Dilation (cm): 2   Effacement: Cervical Effacement: 50    Station: Fetal Station: 1-->-2       Fetal Heart Rate Assessment   Method: Fetal HR Assessment Method: external   Beats/min: Fetal HR (beats/min): 140   Baseline: Fetal HR Baseline: normal range   Variability: Fetal HR Variability: moderate (amplitude range 6 to 25 bpm)   Accels: Fetal HR Accelerations: greater than/equal to 15 bpm, lasting at least 15 seconds   Decels: Fetal HR Decelerations: absent   Tracing Category:       Uterine Assessment   Method: Method: external tocotransducer   Frequency (min):     Ctx Count in 10 min:     Duration:     Intensity:     Intensity by IUPC:     Resting Tone:     Resting Tone by IUPC:     [place] Units:       Laboratory Results:   Results from last 7 days   Lab Units 24  0947   WBC 10*3/mm3 7.68   HEMOGLOBIN g/dL 12.3   HEMATOCRIT % 37.3   PLATELETS 10*3/mm3 302           Assessment & Plan      Pregnancy    Anxiety and depression    39 weeks gestation of pregnancy        Assessment:  1.  Intrauterine pregnancy at 39w5d gestation with reactive, reassuring fetal status.    2.  Induction of labor with favorable cervix  3.  Obstetrical history significant for is non-contributory.  4.  GBS status:   Strep Gp B GERRI   Date Value Ref Range Status   10/17/2024 Negative Negative Final     Comment:     Centers for Disease Control and Prevention (CDC) and American Congress  of Obstetricians and Gynecologists (ACOG) guidelines for prevention of   group B streptococcal (GBS) disease specify co-collection of  a vaginal and rectal swab specimen to maximize sensitivity of GBS  detection. Per the CDC and ACOG, swabbing both the lower vagina and  rectum substantially increases the yield of detection compared with  sampling the vagina alone.  Penicillin G, ampicillin, or cefazolin are indicated for intrapartum  prophylaxis of  GBS colonization. Reflex susceptibility  testing should be performed prior to use of clindamycin only on GBS  isolates from penicillin-allergic  women who are considered a high risk  for anaphylaxis. Treatment with vancomycin without additional testing  is warranted if resistance to clindamycin is noted.         Plan:  1. fetal and uterine monitoring  continuously and labor augmentation  Pitocin  2. Plan of care has been reviewed with patient and her   3.  Risks, benefits of treatment plan have been discussed.  4.  All questions have been answered.        Nate Hay MD  2024  11:10 EST      Electronically signed by Nate Hay MD at 24 1113       Facility-Administered Medications as of 2024   Medication Dose Route Frequency Provider Last Rate Last Admin    [] lactated ringers infusion  125 mL/hr Intravenous Continuous Nate Hay MD   Stopped at 24 181    [] oxytocin (PITOCIN) 30 units in 0.9% sodium chloride 500 mL (premix)  250 mL/hr Intravenous Continuous Nate Hay  mL/hr at 24 1732 250 mL/hr at 24 173    [COMPLETED] oxytocin (PITOCIN) 30 units in 0.9% sodium chloride 500 mL (premix)  125 mL/hr Intravenous Once PRN Nate Hay  mL/hr at 24 1814 125 mL/hr at 24 181    [COMPLETED] sodium chloride 0.9 % bolus 300 mL  300 mL Intrauterine Once Nate Hay MD   Stopped at 24 1319     Lab Results (last 72 hours)       Procedure Component Value Units Date/Time    CBC & Differential [[id number]]  (Abnormal) Collected: 24    Specimen: Blood Updated: 24    Narrative:      The following orders were created for panel order CBC & Differential.  Procedure                               Abnormality         Status                     ---------                               -----------         ------                     CBC Auto Differential[[id number]]        Abnormal            Final result                 Please view results for these tests on the individual orders.    CBC Auto Differential [[id number]]  (Abnormal) Collected: 24    Specimen: Blood  Updated: 11/13/24 0620     WBC 11.65 10*3/mm3      RBC 3.59 10*6/mm3      Hemoglobin 10.8 g/dL      Hematocrit 33.6 %      MCV 93.6 fL      MCH 30.1 pg      MCHC 32.1 g/dL      RDW 13.1 %      RDW-SD 45.0 fl      MPV 11.2 fL      Platelets 222 10*3/mm3      Neutrophil % 75.6 %      Lymphocyte % 15.9 %      Monocyte % 7.6 %      Eosinophil % 0.3 %      Basophil % 0.2 %      Immature Grans % 0.4 %      Neutrophils, Absolute 8.80 10*3/mm3      Lymphocytes, Absolute 1.85 10*3/mm3      Monocytes, Absolute 0.89 10*3/mm3      Eosinophils, Absolute 0.04 10*3/mm3      Basophils, Absolute 0.02 10*3/mm3      Immature Grans, Absolute 0.05 10*3/mm3      nRBC 0.0 /100 WBC     Blood Gas, Venous, Cord [[id number]] Collected: 11/12/24 1730    Specimen: Cord Blood Venous from Umbilical Cord Updated: 11/12/24 1734     Site --     Comment: N/A        pH, Cord Venous 7.329 pH Units      Comment: Serial Number: [id number]:  [id number]        pCO2, Cord Venous 41.3 mm Hg      pO2, Cord Venous 21.7 mm Hg      HCO3, Cord Venous 21.7 mmol/L      Base Excess, Cord Venous -4.1 mmol/L      O2 Sat, Cord Venous --     Comment: Direct O2 saturation result not reported at this site.        CO2 Content 23.0 mmol/L      Barometric Pressure for Blood Gas 754.1000 mmHg      Modality Room Air    Blood Gas, Arterial, Cord [[id number]]  (Abnormal) Collected: 11/12/24 1726    Specimen: Cord Blood Arterial from Umbilical Cord Updated: 11/12/24 1729     Site --     Comment: N/A        pH, Cord Arterial 7.18 pH Units      Comment: Serial Number: [id number]:  [id number]        pCO2, Cord Arterial 68.6 mmHg      pO2, Cord Arterial <22.1 mmHg      HCO3, Cord Arterial 25.5 mmol/L      Base Exc, Cord Arterial -4.7 mmol/L      CO2 Content 27.6 mmol/L      Barometric Pressure for Blood Gas 752.7000 mmHg      Modality Room Air    Treponema pallidum AB w/Reflex RPR [[id number]]  (Normal) Collected: 11/12/24 0947    Specimen: Blood from Arm, Right Updated: 11/12/24 1045      Treponemal AB Total Non-Reactive    Narrative:      Reactive results will reflex RPR testing.    Comprehensive Metabolic Panel [[id number]]  (Abnormal) Collected: 11/12/24 0947    Specimen: Blood from Arm, Right Updated: 11/12/24 1037     Glucose 83 mg/dL      BUN 6 mg/dL      Creatinine 0.60 mg/dL      Sodium 137 mmol/L      Potassium 4.1 mmol/L      Chloride 106 mmol/L      CO2 19.2 mmol/L      Calcium 8.6 mg/dL      Total Protein 6.5 g/dL      Albumin 3.2 g/dL      ALT (SGPT) 13 U/L      AST (SGOT) 20 U/L      Alkaline Phosphatase 157 U/L      Total Bilirubin 0.3 mg/dL      Globulin 3.3 gm/dL      A/G Ratio 1.0 g/dL      BUN/Creatinine Ratio 10.0     Anion Gap 11.8 mmol/L      eGFR 126.3 mL/min/1.73     Narrative:      GFR Normal >60  Chronic Kidney Disease <60  Kidney Failure <15      CBC & Differential [[id number]]  (Normal) Collected: 11/12/24 0947    Specimen: Blood from Arm, Right Updated: 11/12/24 1017    Narrative:      The following orders were created for panel order CBC & Differential.  Procedure                               Abnormality         Status                     ---------                               -----------         ------                     CBC Auto Differential[[id number]]        Normal              Final result                 Please view results for these tests on the individual orders.    CBC Auto Differential [[id number]]  (Normal) Collected: 11/12/24 0947    Specimen: Blood from Arm, Right Updated: 11/12/24 1017     WBC 7.68 10*3/mm3      RBC 4.10 10*6/mm3      Hemoglobin 12.3 g/dL      Hematocrit 37.3 %      MCV 91.0 fL      MCH 30.0 pg      MCHC 33.0 g/dL      RDW 12.7 %      RDW-SD 42.0 fl      MPV 10.9 fL      Platelets 302 10*3/mm3      Neutrophil % 70.3 %      Lymphocyte % 21.7 %      Monocyte % 6.4 %      Eosinophil % 0.8 %      Basophil % 0.4 %      Immature Grans % 0.4 %      Neutrophils, Absolute 5.40 10*3/mm3      Lymphocytes, Absolute 1.67 10*3/mm3      Monocytes, Absolute  0.49 10*3/mm3      Eosinophils, Absolute 0.06 10*3/mm3      Basophils, Absolute 0.03 10*3/mm3      Immature Grans, Absolute 0.03 10*3/mm3      nRBC 0.0 /100 WBC           Imaging Results (Last 72 Hours)       ** No results found for the last 72 hours. **          ECG/EMG Results (last 72 hours)       ** No results found for the last 72 hours. **          Orders (last 72 hrs)        Start     Ordered    11/14/24 0000  ibuprofen (ADVIL,MOTRIN) 600 MG tablet  Every 6 Hours PRN         11/14/24 0948 11/14/24 0000  Pelvic Rest         11/14/24 0948 11/14/24 0000  Call MD for problems / concerns.        Comments: Call for problems with:   1.  Heavy bleeding:  Greater than a pad an hour for more than 2 hours  2.  Fever greater than 101.3   3.  Redness of the episiotomy or vaginal laceration and/or severe pain increased from discharge pain.    4.  Signs of postpartum preeclampsia:  headache, visual changes, elevated blood pressure    11/14/24 0948                  Operative/Procedure Notes (last 72 hours)  Notes from 11/15/24 0929 through 11/18/24 0929   No notes of this type exist for this encounter.       Physician Progress Notes (last 72 hours)  Notes from 11/15/24 0929 through 11/18/24 0929   No notes of this type exist for this encounter.       Consult Notes (last 72 hours)  Notes from 11/15/24 0929 through 11/18/24 0929   No notes of this type exist for this encounter.

## 2024-11-25 ENCOUNTER — MATERNAL SCREENING (OUTPATIENT)
Dept: CALL CENTER | Facility: HOSPITAL | Age: 29
End: 2024-11-25
Payer: COMMERCIAL

## 2024-11-25 NOTE — OUTREACH NOTE
Maternal Screening Survey      Flowsheet Row Responses   Facility patient discharged fromPsychiatric   Attempt successful? Yes   Call start time 1010   Call end time 1013   I have been able to laugh and see the funny side of things. 0   I have looked forward with enjoyment to things. 0   I have blamed myself unnecessarily when things went wrong. 0   I have been anxious or worried for no good reason. 0   I have felt scared or panicky for no good reason. 0   Things have been getting on top of me. 0   I have been so unhappy that I have had difficulty sleeping. 0   I have felt sad or miserable. 0   I have been so unhappy that I have been crying. 0   The thought of harming myself has occurred to me. 0   [place]  Depression Scale Total 0   Did any of your parents have problems with alcohol or drug use? No   Do any of your peers have problems with alcohol or drug use? No   Does your partner have problems with alcohol or drug use? No   In the past month, did you drink beer, wine, liquor or use any other drugs? (pregnancy) No   Maternal Screening call completed Yes   Call end time 1013              Mora SAVAGE - Registered Nurse

## 2024-12-05 ENCOUNTER — POSTPARTUM VISIT (OUTPATIENT)
Dept: OBSTETRICS AND GYNECOLOGY | Age: 29
End: 2024-12-05
Payer: COMMERCIAL

## 2024-12-05 VITALS
HEIGHT: 64 IN | WEIGHT: 171.6 LBS | BODY MASS INDEX: 29.3 KG/M2 | DIASTOLIC BLOOD PRESSURE: 68 MMHG | SYSTOLIC BLOOD PRESSURE: 98 MMHG

## 2024-12-05 DIAGNOSIS — Z12.4 SCREENING FOR CERVICAL CANCER: ICD-10-CM

## 2024-12-05 PROBLEM — Z34.90 PREGNANCY: Status: RESOLVED | Noted: 2024-11-12 | Resolved: 2024-12-05

## 2024-12-05 PROBLEM — R73.09 ABNORMAL GLUCOSE TOLERANCE TEST (GTT): Status: RESOLVED | Noted: 2024-09-04 | Resolved: 2024-12-05

## 2024-12-05 PROBLEM — Z3A.39 39 WEEKS GESTATION OF PREGNANCY: Status: RESOLVED | Noted: 2024-04-11 | Resolved: 2024-12-05

## 2024-12-05 RX ORDER — NORGESTIMATE AND ETHINYL ESTRADIOL 0.25-0.035
1 KIT ORAL DAILY
Qty: 84 TABLET | Refills: 3 | Status: SHIPPED | OUTPATIENT
Start: 2024-12-05

## 2024-12-05 NOTE — PROGRESS NOTES
[facility]   Obstetrics and Gynecology   Postpartum Visit    2024    Patient: Viry Theodore          MR#:[id number]    History of Present Illness    Chief Complaint   Patient presents with    Postpartum Care     Pt has no complaints today.       29 y.o. female  status post  24 presents for postpartum visit.  Mood stable.  Breast-feeding without issue.  Bleeding minimal.  No intercourse since delivery.  Baby girl Rashida is here today and doing great.    Patient wants to start OCPs.  She does report anxiety when she started birth control pills after her last pregnancy.  She also stopped breast-feeding around that time so is not totally sure what the cause was.  She wants to try again.      Postpartum inventory   Infant feeding: (Patient-Rptd) Breast  Postpartum pain: (Patient-Rptd) None  Vaginal bleeding : (Patient-Rptd) Mostly light - only using liner  Depression/Anxiety: (Patient-Rptd) Minimal anxiety  Contracepton: (Patient-Rptd) Birth control pills    Retired EPD Scale: Thought of Harming Self: (Patient-Rptd) 0-->never  The thought of harming myself has occurred to me.: 0  [place]  Depression Scale Total: 0        Contraception: OCPs  Vaccines: RI, VI, s/p tdap, flu, and RSV vaccines  Pap: NIL 2021, repeat today  ________________________________________  Patient Active Problem List   Diagnosis    Anxiety and depression    At high risk for breast cancer    Vaginal delivery       Past Medical History:   Diagnosis Date    Anxiety and depression 2024    Dysmenorrhea     Migraine     Ovarian cyst 2022    L ovary on ultrasound previous pregnancy but resolved.    Placenta previa 2022    Resolved on its own    Pregnancy 2024    Raynaud's syndrome     Urinary tract infection        Past Surgical History:   Procedure Laterality Date    KNEE ACL RECONSTRUCTION Right 2016    WISDOM TOOTH EXTRACTION  2013       Social History     Tobacco Use   Smoking  "Status Never    Passive exposure: Never   Smokeless Tobacco Never       has a current medication list which includes the following prescription(s): ibuprofen, prenatal (classic) vitamin, and norgestimate-ethinyl estradiol.  ________________________________________         Review of Systems   All other systems reviewed and are negative.      Objective     BP 98/68   Ht 162.6 cm (64.02\")   Wt 77.8 kg (171 lb 9.6 oz)   LMP  (LMP Unknown)   Breastfeeding Yes   BMI 29.44 kg/m²    BP Readings from Last 3 Encounters:   12/05/24 98/68   11/14/24 130/85   11/07/24 120/70      Wt Readings from Last 3 Encounters:   12/05/24 77.8 kg (171 lb 9.6 oz)   11/12/24 92.8 kg (204 lb 9.6 oz)   11/07/24 94.2 kg (207 lb 9.6 oz)      BMI: Estimated body mass index is 29.44 kg/m² as calculated from the following:    Height as of this encounter: 162.6 cm (64.02\").    Weight as of this encounter: 77.8 kg (171 lb 9.6 oz).    EXAM     General:     Patient appears well in NAD  Respiratory: Normal effort, no distress  Breast:  declined  Abdomen: Soft, NT, no acute findings  Pelvic:  perineum without signs of infection, uterus small and nontender  Ext:  No cyanosis or edema    Assessment:    Diagnoses and all orders for this visit:    1. Postpartum state (Primary)    2. Vaginal delivery    3. Screening for cervical cancer  -     IGP,rfx Aptima HPV All Pth    Other orders  -     norgestimate-ethinyl estradiol (Sprintec 28) 0.25-35 MG-MCG per tablet; Take 1 tablet by mouth Daily.  Dispense: 84 tablet; Refill: 3      - Doing great postpartum, no acute issues  - Pap today  - Rx Sprintec sent at patient request, discussed holding off on starting these until 6 weeks postpartum    Plan:  Return in about 26 days (around 12/31/2024) for 6wk postpartum visit.      Autumn Ambrose MD  12/5/2024 11:59 EST  "

## 2024-12-11 LAB
CONV .: NORMAL
CYTOLOGIST CVX/VAG CYTO: NORMAL
CYTOLOGY CVX/VAG DOC CYTO: NORMAL
CYTOLOGY CVX/VAG DOC THIN PREP: NORMAL
DX ICD CODE: NORMAL
Lab: NORMAL
OTHER STN SPEC: NORMAL
STAT OF ADQ CVX/VAG CYTO-IMP: NORMAL

## 2024-12-12 ENCOUNTER — POSTPARTUM VISIT (OUTPATIENT)
Dept: OBSTETRICS AND GYNECOLOGY | Age: 29
End: 2024-12-12
Payer: COMMERCIAL

## 2024-12-12 VITALS
BODY MASS INDEX: 29.26 KG/M2 | HEIGHT: 64 IN | SYSTOLIC BLOOD PRESSURE: 114 MMHG | DIASTOLIC BLOOD PRESSURE: 62 MMHG | WEIGHT: 171.4 LBS

## 2024-12-12 DIAGNOSIS — N81.4 CYSTOCELE WITH UTERINE PROLAPSE: ICD-10-CM

## 2024-12-12 NOTE — PROGRESS NOTES
"[facility]   Obstetrics and Gynecology   Postpartum Visit    2024    Patient: Viry Theodore          MR#:[id number]    History of Present Illness    Chief Complaint   Patient presents with    Postpartum Care     PP 4 wks  2024 female \"Ann-Marie\", Breastfeeding, c/o vaginal swelling and lower back pain,        29 y.o. female  status post  24 presents for postpartum visit.  She reports showering recently and noticing a swelling/mass in her vagina that she had not felt before.  No pain.    Mood stable.  Breast-feeding without issue.  Bleeding minimal.  No intercourse since delivery.  Baby girl Rashida is here today and doing great.       Postpartum inventory   Infant feeding: (Patient-Rptd) Breast  Postpartum pain: (Patient-Rptd) None  Vaginal bleeding : (Patient-Rptd) Mostly light - only using liner  Depression/Anxiety: (Patient-Rptd) Minimal anxiety  Contracepton: (Patient-Rptd) Birth control pills    Retired EPD Scale: Thought of Harming Self: (Patient-Rptd) 0-->never  The thought of harming myself has occurred to me.: 0  [place]  Depression Scale Total: 0      Contraception: OCPs  Vaccines: RI, VI, s/p tdap, flu, and RSV vaccines  Pap: NIL 24  ________________________________________  Patient Active Problem List   Diagnosis    Anxiety and depression    At high risk for breast cancer    Vaginal delivery       Past Medical History:   Diagnosis Date    Anxiety and depression 2024    Dysmenorrhea     Migraine     Ovarian cyst 2022    L ovary on ultrasound previous pregnancy but resolved.    Placenta previa 2022    Resolved on its own    Pregnancy 2024    Raynaud's syndrome     Urinary tract infection        Past Surgical History:   Procedure Laterality Date    KNEE ACL RECONSTRUCTION Right 2016    WISDOM TOOTH EXTRACTION  2013       Social History     Tobacco Use   Smoking Status Never    Passive exposure: Never   Smokeless Tobacco Never " "      has a current medication list which includes the following prescription(s): ibuprofen, prenatal (classic) vitamin, and norgestimate-ethinyl estradiol.  ________________________________________         Review of Systems   All other systems reviewed and are negative.      Objective     /62   Ht 162.6 cm (64\")   Wt 77.7 kg (171 lb 6.4 oz)   Breastfeeding Yes   BMI 29.42 kg/m²    BP Readings from Last 3 Encounters:   12/12/24 114/62   12/05/24 98/68   11/14/24 130/85      Wt Readings from Last 3 Encounters:   12/12/24 77.7 kg (171 lb 6.4 oz)   12/05/24 77.8 kg (171 lb 9.6 oz)   11/12/24 92.8 kg (204 lb 9.6 oz)      BMI: Estimated body mass index is 29.42 kg/m² as calculated from the following:    Height as of this encounter: 162.6 cm (64\").    Weight as of this encounter: 77.7 kg (171 lb 6.4 oz).    EXAM     General:     Patient appears well in NAD  Respiratory: Normal effort, no distress  Breast:  declined  Abdomen: Soft, NT, no acute findings  Pelvic:  perineum without signs of infection, stage 2-3 apical/anterior prolapse  Ext:  No cyanosis or edema    Assessment:    Diagnoses and all orders for this visit:    1. Postpartum follow-up (Primary)    2. Cystocele with uterine prolapse  -     Ambulatory Referral to Physical Therapy for Evaluation & Treatment    -We discussed stage II-III apical/anterior prolapse noted on exam.  Otherwise normal so I suspect this is what she was feeling.  - Discussed treatment with pelvic floor physical therapy as first-line.  Surgical repair is next step.  She is open to PT so referral ordered.    Plan:  Return in about 19 days (around 12/31/2024) for 6wk postpartum visit.      Autumn Ambrose MD  12/12/2024 14:15 EST  "

## 2024-12-31 ENCOUNTER — POSTPARTUM VISIT (OUTPATIENT)
Dept: OBSTETRICS AND GYNECOLOGY | Age: 29
End: 2024-12-31
Payer: COMMERCIAL

## 2024-12-31 VITALS
DIASTOLIC BLOOD PRESSURE: 64 MMHG | BODY MASS INDEX: 29.23 KG/M2 | WEIGHT: 171.2 LBS | SYSTOLIC BLOOD PRESSURE: 122 MMHG | HEIGHT: 64 IN

## 2024-12-31 PROCEDURE — 0503F POSTPARTUM CARE VISIT: CPT | Performed by: STUDENT IN AN ORGANIZED HEALTH CARE EDUCATION/TRAINING PROGRAM

## 2024-12-31 NOTE — PROGRESS NOTES
"[facility]   Obstetrics and Gynecology   Postpartum Visit    2024    Patient: Viry Theodore          MR#:[id number]    History of Present Illness    Chief Complaint   Patient presents with    Postpartum Care     PP 7 wks  2024 female \"Rashida\", Breastfeeding, No problems today       29 y.o. female  status post  24 presents for postpartum visit.  Mood stable.  Breast-feeding without issue.  Bleeding resolved.  No intercourse since delivery.  She was referred to pelvic floor physical therapy at last visit for apical/anterior prolapse.  First appt is Friday.    Baby Rashida is doing great.  She is here today.      Postpartum inventory   Infant feeding: (Patient-Rptd) Breast  Postpartum pain: (Patient-Rptd) None  Vaginal bleeding : (Patient-Rptd) Mostly light - only using liner  Depression/Anxiety: (Patient-Rptd) Minimal anxiety  Contracepton: (Patient-Rptd) Birth control pills      Postpartum Depression: Low Risk  (2024)    [place]  Depression Scale     Last EPDS Total Score: 0     Last EPDS Self Harm Result: Not on file       Contraception: OCPs  Vaccines: RI, VI, s/p tdap, flu, and RSV vaccines  Pap: NIL 24      ________________________________________  Patient Active Problem List   Diagnosis    Anxiety and depression    At high risk for breast cancer    Vaginal delivery       Past Medical History:   Diagnosis Date    Anxiety and depression 2024    Dysmenorrhea     Migraine     Ovarian cyst 2022    L ovary on ultrasound previous pregnancy but resolved.    Placenta previa 2022    Resolved on its own    Pregnancy 2024    Raynaud's syndrome     Urinary tract infection        Past Surgical History:   Procedure Laterality Date    KNEE ACL RECONSTRUCTION Right 2016    WISDOM TOOTH EXTRACTION  2013       Social History     Tobacco Use   Smoking Status Never    Passive exposure: Never   Smokeless Tobacco Never       has a current " "medication list which includes the following prescription(s): ibuprofen, prenatal (classic) vitamin, and norgestimate-ethinyl estradiol.  ________________________________________         Review of Systems   All other systems reviewed and are negative.      Objective     /64   Ht 162.6 cm (64\")   Wt 77.7 kg (171 lb 3.2 oz)   LMP  (LMP Unknown)   Breastfeeding Yes   BMI 29.39 kg/m²    BP Readings from Last 3 Encounters:   12/31/24 122/64   12/12/24 114/62   12/05/24 98/68      Wt Readings from Last 3 Encounters:   12/31/24 77.7 kg (171 lb 3.2 oz)   12/12/24 77.7 kg (171 lb 6.4 oz)   12/05/24 77.8 kg (171 lb 9.6 oz)      BMI: Estimated body mass index is 29.39 kg/m² as calculated from the following:    Height as of this encounter: 162.6 cm (64\").    Weight as of this encounter: 77.7 kg (171 lb 3.2 oz).    EXAM     General:     Patient appears well in NAD  Respiratory: Normal effort, no distress  Breast:  declined  Abdomen: Soft, NT, no acute findings  Pelvic:  perineum without signs of infection, uterus small and nontender  Ext:  No cyanosis or edema    Assessment:    Diagnoses and all orders for this visit:    1. Postpartum follow-up (Primary)    -Doing great postpartum, no acute issues  - Prescription for OCPs already sent  - Pap up-to-date    Plan:  Return in about 1 year (around 12/31/2025) for Annual.      Autumn Ambrose MD  12/31/2024 09:45 EST  "

## 2025-02-13 ENCOUNTER — OFFICE VISIT (OUTPATIENT)
Dept: INTERNAL MEDICINE | Age: 30
End: 2025-02-13
Payer: COMMERCIAL

## 2025-02-13 VITALS
HEART RATE: 87 BPM | WEIGHT: 169 LBS | HEIGHT: 64 IN | DIASTOLIC BLOOD PRESSURE: 82 MMHG | SYSTOLIC BLOOD PRESSURE: 124 MMHG | TEMPERATURE: 97.1 F | OXYGEN SATURATION: 97 % | BODY MASS INDEX: 28.85 KG/M2

## 2025-02-13 DIAGNOSIS — R10.13 EPIGASTRIC PAIN: Primary | ICD-10-CM

## 2025-02-13 LAB
B-HCG UR QL: NEGATIVE
BILIRUB BLD-MCNC: NEGATIVE MG/DL
CLARITY, POC: CLEAR
COLOR UR: YELLOW
EXPIRATION DATE: NORMAL
GLUCOSE UR STRIP-MCNC: NEGATIVE MG/DL
INTERNAL NEGATIVE CONTROL: NEGATIVE
INTERNAL POSITIVE CONTROL: POSITIVE
KETONES UR QL: NEGATIVE
LEUKOCYTE EST, POC: ABNORMAL
Lab: NORMAL
NITRITE UR-MCNC: NEGATIVE MG/ML
PH UR: 5.5 [PH] (ref 5–8)
PROT UR STRIP-MCNC: NEGATIVE MG/DL
RBC # UR STRIP: NEGATIVE /UL
SP GR UR: 1.02 (ref 1–1.03)
UROBILINOGEN UR QL: NORMAL

## 2025-02-13 PROCEDURE — 81002 URINALYSIS NONAUTO W/O SCOPE: CPT

## 2025-02-13 PROCEDURE — 99214 OFFICE O/P EST MOD 30 MIN: CPT

## 2025-02-13 PROCEDURE — 81025 URINE PREGNANCY TEST: CPT

## 2025-02-13 NOTE — PROGRESS NOTES
"    I N T E R N A L  M E D I C I N E  Risa Owen, MEME    ENCOUNTER DATE:  02/13/2025    Viry Theodore / 29 y.o. / female      CHIEF COMPLAINT / REASON FOR OFFICE VISIT     Abdominal Pain (Pt has had some abdominal pain for about 2-3 weeks. Pain occurs mainly after she eats. Not currently  in pain.)      ASSESSMENT & PLAN     Diagnoses and all orders for this visit:    1. Epigastric pain (Primary)  -     POC Urinalysis Dipstick  -     POCT pregnancy, urine  -     CBC & Differential  -     Comprehensive Metabolic Panel  -     Celiac Disease Panel  -     Lipase         SUMMARY/DISCUSSION  Urinalysis negative for infection.  Pregnancy test negative.    Abdominal exam benign at today's visit.  Consider GERD, gastritis, gallbladder disease.  May start daily Pepcid OTC and monitor for any symptom improvement.  Obtain labs, and will consider ordering gallbladder US pending review for further assessment.  She will reach out for any new symptoms and aware to visit ER for any acutely worsening symptoms.      Next Appointment with me: Visit date not found    Return in about 6 weeks (around 3/27/2025) for Annual physical.      VITAL SIGNS     Visit Vitals  /82   Pulse 87   Temp 97.1 °F (36.2 °C)   Ht 162.6 cm (64\")   Wt 76.7 kg (169 lb)   SpO2 97%   BMI 29.01 kg/m²             Wt Readings from Last 3 Encounters:   02/13/25 76.7 kg (169 lb)   12/31/24 77.7 kg (171 lb 3.2 oz)   12/12/24 77.7 kg (171 lb 6.4 oz)     Body mass index is 29.01 kg/m².        MEDICATIONS AT THE TIME OF OFFICE VISIT     Current Outpatient Medications on File Prior to Visit   Medication Sig Dispense Refill    ibuprofen (ADVIL,MOTRIN) 600 MG tablet Take 1 tablet by mouth Every 6 (Six) Hours As Needed for Moderate Pain. 30 tablet 1    prenatal vitamin (prenatal, CLASSIC, vitamin) tablet Take  by mouth Daily.      norgestimate-ethinyl estradiol (Sprintec 28) 0.25-35 MG-MCG per tablet Take 1 tablet by mouth Daily. (Patient not taking: " Reported on 12/12/2024) 84 tablet 3     No current facility-administered medications on file prior to visit.        HISTORY OF PRESENT ILLNESS     3 weeks of intermittent epigastric abdominal discomfort accompanied by bloating, sometimes minor nausea.  Non radiating.  Pain mostly occurs about 30 minutes after eating breakfast, which usually consists of sourdough toast.  Feels like cramping.  Lasts about 1 hour.  Similar symptoms can occur after lunch or after dinner, but less frequently.  Denies any worsening of symptoms with fatty, greasy, oily meals.  She is concerned about possible Celiac disease.     No fever, chills, chest pain, dyspnea, vomiting, dyspepsia, diarrhea, constipation, blood in stool, urinary symptoms.  Does have uterine prolapse.  Family history of diverticulosis.  Prior history of dysmenorrhea, ovarian cysts, UTIs.  No prior abdominal surgeries.    Maternal grandmother with celiac disease.      Has 3 month old daughter - Ann-Marie.    Patient Care Team:  Risa Owen APRN as PCP - General (Family Medicine)  Courtney Graves PA as Physician Assistant (Obstetrics and Gynecology)  Saritha Nelson MD as Obstetrician (Obstetrics and Gynecology)    REVIEW OF SYSTEMS     Review of Systems   Constitutional:  Negative for chills, fever and unexpected weight change.   Respiratory:  Negative for cough, chest tightness and shortness of breath.    Cardiovascular:  Negative for chest pain, palpitations and leg swelling.   Gastrointestinal:  Positive for abdominal pain and nausea.        +Bloating   Genitourinary:  Negative for dysuria, frequency, hematuria, urgency and vaginal bleeding.   Neurological:  Negative for dizziness, weakness, light-headedness and headaches.   Psychiatric/Behavioral:  The patient is not nervous/anxious.           PHYSICAL EXAMINATION     Physical Exam  Vitals reviewed.   Constitutional:       General: She is not in acute distress.     Appearance: Normal appearance. She is not  ill-appearing, toxic-appearing or diaphoretic.   HENT:      Head: Normocephalic and atraumatic.   Cardiovascular:      Rate and Rhythm: Normal rate and regular rhythm.      Heart sounds: Normal heart sounds.   Pulmonary:      Effort: Pulmonary effort is normal.      Breath sounds: Normal breath sounds.   Abdominal:      General: Bowel sounds are normal.      Palpations: Abdomen is soft.      Tenderness: There is no abdominal tenderness.   Neurological:      Mental Status: She is alert and oriented to person, place, and time. Mental status is at baseline.   Psychiatric:         Mood and Affect: Mood normal.         Behavior: Behavior normal.         Thought Content: Thought content normal.         Judgment: Judgment normal.           REVIEWED DATA     Labs:           Imaging:            Medical Tests:           Summary of old records / correspondence / consultant report:           Request outside records:

## 2025-02-14 DIAGNOSIS — R10.13 EPIGASTRIC ABDOMINAL PAIN: Primary | ICD-10-CM

## 2025-02-14 LAB
ALBUMIN SERPL-MCNC: 4.5 G/DL (ref 4–5)
ALP SERPL-CCNC: 113 IU/L (ref 44–121)
ALT SERPL-CCNC: 27 IU/L (ref 0–32)
AST SERPL-CCNC: 20 IU/L (ref 0–40)
BASOPHILS # BLD AUTO: 0.1 X10E3/UL (ref 0–0.2)
BASOPHILS NFR BLD AUTO: 1 %
BILIRUB SERPL-MCNC: 0.3 MG/DL (ref 0–1.2)
BUN SERPL-MCNC: 22 MG/DL (ref 6–20)
BUN/CREAT SERPL: 22 (ref 9–23)
CALCIUM SERPL-MCNC: 9.4 MG/DL (ref 8.7–10.2)
CHLORIDE SERPL-SCNC: 101 MMOL/L (ref 96–106)
CO2 SERPL-SCNC: 22 MMOL/L (ref 20–29)
CREAT SERPL-MCNC: 0.99 MG/DL (ref 0.57–1)
EGFRCR SERPLBLD CKD-EPI 2021: 80 ML/MIN/1.73
ENDOMYSIUM IGA SER QL: NEGATIVE
EOSINOPHIL # BLD AUTO: 0.2 X10E3/UL (ref 0–0.4)
EOSINOPHIL NFR BLD AUTO: 2 %
ERYTHROCYTE [DISTWIDTH] IN BLOOD BY AUTOMATED COUNT: 15.2 % (ref 11.7–15.4)
GLOBULIN SER CALC-MCNC: 2.9 G/DL (ref 1.5–4.5)
GLUCOSE SERPL-MCNC: 83 MG/DL (ref 70–99)
HCT VFR BLD AUTO: 42.8 % (ref 34–46.6)
HGB BLD-MCNC: 14 G/DL (ref 11.1–15.9)
IGA SERPL-MCNC: 394 MG/DL (ref 87–352)
IMM GRANULOCYTES # BLD AUTO: 0 X10E3/UL (ref 0–0.1)
IMM GRANULOCYTES NFR BLD AUTO: 0 %
LIPASE SERPL-CCNC: 32 U/L (ref 14–72)
LYMPHOCYTES # BLD AUTO: 2.3 X10E3/UL (ref 0.7–3.1)
LYMPHOCYTES NFR BLD AUTO: 29 %
MCH RBC QN AUTO: 30.8 PG (ref 26.6–33)
MCHC RBC AUTO-ENTMCNC: 32.7 G/DL (ref 31.5–35.7)
MCV RBC AUTO: 94 FL (ref 79–97)
MONOCYTES # BLD AUTO: 0.8 X10E3/UL (ref 0.1–0.9)
MONOCYTES NFR BLD AUTO: 10 %
NEUTROPHILS # BLD AUTO: 4.7 X10E3/UL (ref 1.4–7)
NEUTROPHILS NFR BLD AUTO: 58 %
PLATELET # BLD AUTO: 290 X10E3/UL (ref 150–450)
POTASSIUM SERPL-SCNC: 4.3 MMOL/L (ref 3.5–5.2)
PROT SERPL-MCNC: 7.4 G/DL (ref 6–8.5)
RBC # BLD AUTO: 4.54 X10E6/UL (ref 3.77–5.28)
SODIUM SERPL-SCNC: 138 MMOL/L (ref 134–144)
TTG IGA SER-ACNC: <2 U/ML (ref 0–3)
WBC # BLD AUTO: 8 X10E3/UL (ref 3.4–10.8)

## 2025-02-21 ENCOUNTER — HOSPITAL ENCOUNTER (OUTPATIENT)
Dept: ULTRASOUND IMAGING | Facility: HOSPITAL | Age: 30
Discharge: HOME | End: 2025-02-21
Payer: COMMERCIAL

## 2025-02-21 DIAGNOSIS — R10.13 EPIGASTRIC ABDOMINAL PAIN: ICD-10-CM

## 2025-02-21 PROCEDURE — 76705 ECHO EXAM OF ABDOMEN: CPT

## 2025-02-25 DIAGNOSIS — R10.13 EPIGASTRIC ABDOMINAL PAIN: Primary | ICD-10-CM

## 2025-03-27 NOTE — PROGRESS NOTES
"    I N T E R N A L  M E D I C I N E  Risa Owen, APRN      ENCOUNTER DATE:  03/28/2025    Viry Theodore / 29 y.o. / female      CHIEF COMPLAINT     Annual Exam and Abdominal Pain    Seen on February 13, 2025 for epigastric abdominal pain.  Started on daily Famotidine.  Reports she is taking once every couple weeks.  Symptoms mostly improving.  Experienced a self limited episode of epigastric abdominal discomfort after drinking beer and eating salad last week.  No nausea, vomiting, bowel habit changes.  February 2025 Gallbladder US normal.  HIDA ordered; not yet completed.    Saw Dr. Reyes for high risk breast cancer history in June 2024.  Plan to start MRI and mammogram at age 32.  Mom with precancerous polyps; plan to start screening colonoscopy at age 42.    VITALS     Visit Vitals  /70   Pulse 112   Temp 97.8 °F (36.6 °C)   Ht 162.6 cm (64\")   Wt 77.7 kg (171 lb 3.2 oz)   SpO2 97%   BMI 29.39 kg/m²       BP Readings from Last 3 Encounters:   03/28/25 122/70   02/13/25 124/82   12/31/24 122/64     Wt Readings from Last 3 Encounters:   03/28/25 77.7 kg (171 lb 3.2 oz)   02/13/25 76.7 kg (169 lb)   12/31/24 77.7 kg (171 lb 3.2 oz)      Body mass index is 29.39 kg/m².       MEDICATIONS     Current Outpatient Medications on File Prior to Visit   Medication Sig Dispense Refill    ibuprofen (ADVIL,MOTRIN) 600 MG tablet Take 1 tablet by mouth Every 6 (Six) Hours As Needed for Moderate Pain. 30 tablet 1    [DISCONTINUED] norgestimate-ethinyl estradiol (Sprintec 28) 0.25-35 MG-MCG per tablet Take 1 tablet by mouth Daily. (Patient not taking: Reported on 3/28/2025) 84 tablet 3    [DISCONTINUED] prenatal vitamin (prenatal, CLASSIC, vitamin) tablet Take  by mouth Daily. (Patient not taking: Reported on 3/28/2025)       No current facility-administered medications on file prior to visit.         HISTORY OF PRESENT ILLNESS     Viry presents for annual health maintenance visit.    General health: " good  Lifestyle:  Attempting to lose weight?: Yes   Diet: eats a well balanced, healthy diet  Exercise: exercises 3 days weekly, running  Tobacco: Never used   Alcohol: 2 days/week and 1 drink/occasion  Work: Full-time  Reproductive health:  Sexually active?: Yes   Sexual problems?: No problems  Concern for STD?: No    Sees Gynecologist?: Yes   Viviana/Postmenopausal?: No   Domestic abuse concerns: No   Depression Screening:          3/28/2025     7:48 AM   PHQ-2/PHQ-9 Depression Screening   Little interest or pleasure in doing things Not at all   Feeling down, depressed, or hopeless Not at all   How difficult have these problems made it for you to do your work, take care of things at home, or get along with other people? Not difficult at all         PHQ-2: 0 (Not depressed)   PHQ-9: 0 (Negative screening for depression)    Patient Care Team:  Risa Owen APRN as PCP - General (Family Medicine)  Autumn Ambrose MD as Consulting Physician (Obstetrics and Gynecology)  Delfino Reyes MD as Surgeon (Breast Surgery)      ALLERGIES  No Known Allergies     PFSH:     The following portions of the patient's history were reviewed and updated as appropriate: Allergies / Current Medications / Past Medical History / Surgical History / Social History / Family History    PROBLEM LIST   Patient Active Problem List   Diagnosis    Anxiety and depression    At high risk for breast cancer    Vaginal delivery       PAST MEDICAL HISTORY  Past Medical History:   Diagnosis Date    Anxiety and depression 03/07/2024    Dysmenorrhea     Migraine     Ovarian cyst 01/2022    L ovary on ultrasound previous pregnancy but resolved.    Placenta previa 05/2022    Resolved on its own    Pregnancy 11/12/2024    Raynaud's syndrome     Urinary tract infection        SURGICAL HISTORY  Past Surgical History:   Procedure Laterality Date    KNEE ACL RECONSTRUCTION Right 2016    WISDOM TOOTH EXTRACTION  2013       SOCIAL HISTORY  Social History      Socioeconomic History    Marital status:    Tobacco Use    Smoking status: Never     Passive exposure: Never    Smokeless tobacco: Never   Vaping Use    Vaping status: Never Used   Substance and Sexual Activity    Alcohol use: Not Currently     Alcohol/week: 3.0 standard drinks of alcohol     Types: 3 Glasses of wine per week     Comment: I may have a few glasses of wine on the weekends or socially    Drug use: No    Sexual activity: Yes     Partners: Male     Birth control/protection: None     Comment:  Ady       FAMILY HISTORY  Family History   Problem Relation Age of Onset    Breast cancer Mother 40    Cancer Mother     Hyperlipidemia Mother     Alcohol abuse Father     Anxiety disorder Father     Depression Father     Anxiety disorder Sister     Depression Sister     Endometriosis Maternal Grandmother     Melanoma Maternal Grandmother     Cancer Maternal Grandmother     COPD Maternal Grandmother     Mental illness Maternal Grandmother     Thyroid disease Maternal Grandmother     Stroke Maternal Grandmother     Irritable bowel syndrome Maternal Grandmother     Liver cancer Maternal Grandfather     Irritable bowel syndrome Paternal Grandmother     Stroke Paternal Grandmother     Liver cancer Paternal Grandfather     Ovarian cancer Neg Hx     Uterine cancer Neg Hx     Pancreatic cancer Neg Hx     Congenital heart disease Neg Hx     Cystic fibrosis Neg Hx     Colon cancer Neg Hx        IMMUNIZATION HISTORY  Immunization History   Administered Date(s) Administered    ABRYSVO (RSV, 60+ or pregnant women 32-36 wks) 09/26/2024    COVID-19 (PFIZER) Purple Cap Monovalent 12/17/2020, 01/07/2021    DTaP 1997, 1997, 1997, 02/02/1999, 08/17/2001    FluMist 2-49yrs (Nasal) 10/01/2009    Fluzone  >6mos 09/26/2024    Fluzone (or Fluarix & Flulaval for VFC) >6mos 10/02/2019    HPV Quadrivalent 05/30/2008, 10/08/2008, 08/02/2011    Hep A, 2 Dose 05/30/2008, 08/02/2011    Hep B, Adolescent or  Pediatric 1997, 1997    Hib (PRP-OMP) 1997, 1997, 1997, 02/02/1999    IPV 1997, 1997, 02/02/1999, 08/17/2001    MMR 02/02/1999, 08/17/2001    Meningococcal MCV4P (Menactra) 05/30/2008, 08/11/2015    PEDS-Pneumococcal Conjugate (PCV7) 06/07/2000    PPD Test 02/05/2015, 08/17/2015, 09/16/2020    Tdap 05/30/2008, 10/10/2018, 09/04/2024    Varicella 05/29/1998, 05/30/2008         REVIEW OF SYSTEMS     Review of Systems   Constitutional:  Negative for chills, fever and unexpected weight change.   Respiratory:  Negative for cough, chest tightness and shortness of breath.    Cardiovascular:  Negative for chest pain, palpitations and leg swelling.   Gastrointestinal:  Negative for abdominal pain, constipation, diarrhea, nausea and vomiting.   Neurological:  Negative for dizziness, weakness, light-headedness and headaches.   Psychiatric/Behavioral:  The patient is not nervous/anxious.          PHYSICAL EXAMINATION     Physical Exam  Vitals reviewed.   Constitutional:       General: She is not in acute distress.     Appearance: Normal appearance. She is not ill-appearing, toxic-appearing or diaphoretic.   HENT:      Head: Normocephalic and atraumatic.      Right Ear: Tympanic membrane, ear canal and external ear normal. There is no impacted cerumen.      Left Ear: Tympanic membrane, ear canal and external ear normal. There is no impacted cerumen.      Nose: Nose normal. No congestion or rhinorrhea.      Mouth/Throat:      Mouth: Mucous membranes are moist.      Pharynx: Oropharynx is clear. No oropharyngeal exudate or posterior oropharyngeal erythema.   Eyes:      Extraocular Movements: Extraocular movements intact.      Conjunctiva/sclera: Conjunctivae normal.      Pupils: Pupils are equal, round, and reactive to light.   Cardiovascular:      Rate and Rhythm: Normal rate and regular rhythm.      Heart sounds: Normal heart sounds.   Pulmonary:      Effort: Pulmonary effort is normal.  No respiratory distress.      Breath sounds: Normal breath sounds.   Abdominal:      General: Bowel sounds are normal.      Palpations: Abdomen is soft.      Tenderness: There is no abdominal tenderness.   Musculoskeletal:         General: Normal range of motion.      Cervical back: Normal range of motion and neck supple.      Right lower leg: No edema.      Left lower leg: No edema.   Lymphadenopathy:      Cervical: No cervical adenopathy.   Skin:     General: Skin is warm and dry.   Neurological:      General: No focal deficit present.      Mental Status: She is alert and oriented to person, place, and time. Mental status is at baseline.   Psychiatric:         Mood and Affect: Mood normal.         Behavior: Behavior normal.         Thought Content: Thought content normal.         Judgment: Judgment normal.         REVIEWED DATA      Labs:    Lab Results   Component Value Date     02/13/2025    K 4.3 02/13/2025    CALCIUM 9.4 02/13/2025    AST 20 02/13/2025    ALT 27 02/13/2025    BUN 22 (H) 02/13/2025    CREATININE 0.99 02/13/2025    CREATININE 0.60 11/12/2024    CREATININE 0.87 01/31/2024    EGFRIFNONA 86 08/08/2018    EGFRIFAFRI 104 08/08/2018       Lab Results   Component Value Date    GLUCOSE 83 02/13/2025    HGBA1C 5.20 01/31/2024    TSH 2.850 01/31/2024    FREET4 1.32 01/31/2024       Lab Results   Component Value Date     (H) 01/31/2024    HDL 60 01/31/2024    TRIG 74 01/31/2024    CHOLHDLRATIO 3.52 01/31/2024       Lab Results   Component Value Date    PBOE87ID 32.5 01/31/2024        Lab Results   Component Value Date    WBC 8.0 02/13/2025    HGB 14.0 02/13/2025    MCV 94 02/13/2025     02/13/2025       Lab Results   Component Value Date    GLUCOSEU Negative 10/24/2024    BLOODU Negative 10/24/2024    NITRITEU Negative 10/24/2024    LEUKOCYTESUR Trace (A) 02/13/2025          Lab Results   Component Value Date    HEPCVIRUSABY Non Reactive 04/11/2024       Imaging:        Medical  Tests:        ASSESSMENT & PLAN     ANNUAL WELLNESS EXAM / PHYSICAL     Diagnoses and all orders for this visit:    1. Annual physical exam (Primary)  -     Hemoglobin A1c  -     Lipid Panel With / Chol / HDL Ratio  -     TSH+Free T4    2. Abdominal discomfort, epigastric  -     omeprazole (priLOSEC) 40 MG capsule; Take 1 capsule by mouth Daily.  Dispense: 90 capsule; Refill: 0         Summary/Discussion:     Recheck HR 96.  Suspect intermittent epigastric abdominal pain is possible gastritis.  Recent gallbladder US normal.  Start daily PPI.  Avoid alcohol, NSAIDS.  Re assess in 6 weeks.      Next Appointment with me: Visit date not found    Return for 6 week recheck (via telehealth), 1 year annual physical.      HEALTHCARE MAINTENANCE ISSUES     Cancer Screening:  Colon: Initial/Next screening at age: 42 (mom with precancerous polyps)  Repeat colon cancer screening: N/A at this time  Breast: Recommended monthly self exams; annual professional exam  Mammogram:  Age 32  Cervical:  Followed by OBGYN  Skin: Monthly self skin examination, annual exam by health professional  Lung: Does not meet criteria for lung cancer screening.   Other:    Screening Labs & Tests:  Lab results reviewed & discussed with the patient or test orders placed today.  EKG:  CV Screening: Lipid panel  DEXA (65+ or postmenopausal with risk factors):   HEP C (If born [birth year], or risk factors): Previously had negative screen  Other:     Immunization/Vaccinations (to be given today unless deferred by patient)  Influenza: Recommended annual influenza vaccine  Hepatitis A: Verify immunization records  Hepatitis B: Verify immunization records  Tetanus/Pertussis: Up to date  Pneumovax: Not needed at this time  Shingles: Not needed at this time  COVID: Does not plan to get the latest booster    Lifestyle Counseling:  Lifestyle Modifications: Attempt to lose weight, Continue good lifestyle choices/modifications, Continue to abstain/curtail drinking, and  Reduce exposure to stress if possible  Safety Issues: Always wear seatbelt, Avoid texting while driving   Use sunscreen, regular skin examination  Recommended annual dental/vision examination.  Emotional/Stress/Sleep: Reviewed and  given when appropriate      Health Maintenance   Topic Date Due    COVID-19 Vaccine (3 - 2024-25 season) 09/24/2025 (Originally 9/1/2024)    ANNUAL PHYSICAL  03/28/2026    PAP SMEAR  12/05/2027    TDAP/TD VACCINES (4 - Td or Tdap) 09/04/2034    HEPATITIS C SCREENING  Completed    INFLUENZA VACCINE  Completed    Pneumococcal Vaccine 0-49  Aged Out    CHLAMYDIA SCREENING  Discontinued

## 2025-03-28 ENCOUNTER — OFFICE VISIT (OUTPATIENT)
Dept: INTERNAL MEDICINE | Age: 30
End: 2025-03-28
Payer: COMMERCIAL

## 2025-03-28 VITALS
WEIGHT: 171.2 LBS | DIASTOLIC BLOOD PRESSURE: 70 MMHG | OXYGEN SATURATION: 97 % | BODY MASS INDEX: 29.23 KG/M2 | HEIGHT: 64 IN | TEMPERATURE: 97.8 F | SYSTOLIC BLOOD PRESSURE: 122 MMHG | HEART RATE: 112 BPM

## 2025-03-28 DIAGNOSIS — Z00.00 ANNUAL PHYSICAL EXAM: Primary | ICD-10-CM

## 2025-03-28 DIAGNOSIS — R10.13 ABDOMINAL DISCOMFORT, EPIGASTRIC: ICD-10-CM

## 2025-03-28 LAB
CHOLEST SERPL-MCNC: 187 MG/DL (ref 0–200)
CHOLEST/HDLC SERPL: 3.4 {RATIO}
HBA1C MFR BLD: 4.9 % (ref 4.8–5.6)
HDLC SERPL-MCNC: 55 MG/DL (ref 40–60)
LDLC SERPL CALC-MCNC: 120 MG/DL (ref 0–100)
T4 FREE SERPL-MCNC: 1.11 NG/DL (ref 0.92–1.68)
TRIGL SERPL-MCNC: 63 MG/DL (ref 0–150)
TSH SERPL DL<=0.005 MIU/L-ACNC: 1.94 UIU/ML (ref 0.27–4.2)
VLDLC SERPL CALC-MCNC: 12 MG/DL (ref 5–40)

## 2025-03-28 RX ORDER — OMEPRAZOLE 40 MG/1
40 CAPSULE, DELAYED RELEASE ORAL DAILY
Qty: 90 CAPSULE | Refills: 0 | Status: SHIPPED | OUTPATIENT
Start: 2025-03-28

## 2025-04-08 DIAGNOSIS — R10.13 EPIGASTRIC ABDOMINAL PAIN: Primary | ICD-10-CM

## 2025-04-08 RX ORDER — SUCRALFATE 1 G/1
1 TABLET ORAL 4 TIMES DAILY
Qty: 120 TABLET | Refills: 0 | Status: SHIPPED | OUTPATIENT
Start: 2025-04-08

## 2025-04-08 RX ORDER — PANTOPRAZOLE SODIUM 40 MG/1
40 TABLET, DELAYED RELEASE ORAL DAILY
Qty: 90 TABLET | Refills: 0 | Status: SHIPPED | OUTPATIENT
Start: 2025-04-08

## 2025-04-29 ENCOUNTER — PREP FOR SURGERY (OUTPATIENT)
Dept: OTHER | Facility: HOSPITAL | Age: 30
End: 2025-04-29
Payer: COMMERCIAL

## 2025-04-29 ENCOUNTER — OFFICE VISIT (OUTPATIENT)
Dept: GASTROENTEROLOGY | Facility: CLINIC | Age: 30
End: 2025-04-29
Payer: COMMERCIAL

## 2025-04-29 VITALS
HEART RATE: 73 BPM | SYSTOLIC BLOOD PRESSURE: 124 MMHG | HEIGHT: 63 IN | WEIGHT: 163 LBS | TEMPERATURE: 97.1 F | DIASTOLIC BLOOD PRESSURE: 86 MMHG | BODY MASS INDEX: 28.88 KG/M2

## 2025-04-29 DIAGNOSIS — R10.13 EPIGASTRIC PAIN: Primary | ICD-10-CM

## 2025-04-29 PROCEDURE — 99204 OFFICE O/P NEW MOD 45 MIN: CPT | Performed by: INTERNAL MEDICINE

## 2025-04-29 RX ORDER — FAMOTIDINE 40 MG/1
40 TABLET, FILM COATED ORAL DAILY
Qty: 30 TABLET | Refills: 2 | Status: SHIPPED | OUTPATIENT
Start: 2025-04-29

## 2025-04-30 ENCOUNTER — TELEPHONE (OUTPATIENT)
Dept: GASTROENTEROLOGY | Facility: CLINIC | Age: 30
End: 2025-04-30
Payer: COMMERCIAL

## 2025-04-30 NOTE — PROGRESS NOTES
Subjective   Chief Complaint   Patient presents with    Abdominal Pain       Viry Theodore is a  29 y.o. female here for a epigastric pain.  All problems are new to me today.  She reports that she has pain in the morning upon awakening.  Worse with eating.  Often times gets better as the day goes on.  Some nausea no vomiting.  No regular NSAIDs.  No change in her bowel habits or blood in her stool.  She has tried pantoprazole but this did not really help.  Carafate helped somewhat.  Negative gallbladder ultrasound.  Normal celiac panel.  LFTs and electrolytes are normal.  Normal H&H.  HPI  Past Medical History:   Diagnosis Date    Anxiety and depression 03/07/2024    Dysmenorrhea     Migraine     I’ve had migraines since a child. I get them 2-3x a year    Ovarian cyst 01/2022    L ovary on ultrasound previous pregnancy but resolved.    Placenta previa 05/2022    Resolved on its own    Pregnancy 11/12/2024    Raynaud's syndrome     Urinary tract infection      Past Surgical History:   Procedure Laterality Date    KNEE ACL RECONSTRUCTION Right 2016    WISDOM TOOTH EXTRACTION  2013       Current Outpatient Medications:     ibuprofen (ADVIL,MOTRIN) 600 MG tablet, Take 1 tablet by mouth Every 6 (Six) Hours As Needed for Moderate Pain., Disp: 30 tablet, Rfl: 1    sucralfate (Carafate) 1 g tablet, Take 1 tablet by mouth 4 (Four) Times a Day., Disp: 120 tablet, Rfl: 0    famotidine (PEPCID) 40 MG tablet, Take 1 tablet by mouth Daily., Disp: 30 tablet, Rfl: 2  PRN Meds:.  No Known Allergies  Social History     Socioeconomic History    Marital status:    Tobacco Use    Smoking status: Never     Passive exposure: Never    Smokeless tobacco: Never   Vaping Use    Vaping status: Never Used   Substance and Sexual Activity    Alcohol use: Yes     Alcohol/week: 3.0 standard drinks of alcohol     Types: 3 Glasses of wine per week     Comment: I may have a few glasses of wine on the weekends or socially    Drug use:  No    Sexual activity: Yes     Partners: Male     Birth control/protection: None     Comment:  Ady     Family History   Problem Relation Age of Onset    Breast cancer Mother 40    Cancer Mother     Hyperlipidemia Mother     Alcohol abuse Father     Anxiety disorder Father     Depression Father     Colon polyps Father     Anxiety disorder Sister     Depression Sister     Endometriosis Maternal Grandmother     Melanoma Maternal Grandmother     Cancer Maternal Grandmother     COPD Maternal Grandmother     Mental illness Maternal Grandmother     Thyroid disease Maternal Grandmother     Stroke Maternal Grandmother     Irritable bowel syndrome Maternal Grandmother     Liver cancer Maternal Grandfather     Irritable bowel syndrome Paternal Grandmother     Stroke Paternal Grandmother     Liver cancer Paternal Grandfather     Ovarian cancer Neg Hx     Uterine cancer Neg Hx     Pancreatic cancer Neg Hx     Congenital heart disease Neg Hx     Cystic fibrosis Neg Hx     Colon cancer Neg Hx      Review of Systems   Constitutional:  Positive for appetite change. Negative for unexpected weight change.   Gastrointestinal:  Positive for abdominal pain and nausea. Negative for blood in stool and constipation.     Vitals:    04/29/25 1626   BP: 124/86   Pulse: 73   Temp: 97.1 °F (36.2 °C)         04/29/25  1626   Weight: 73.9 kg (163 lb)       Objective   Physical Exam  Constitutional:       Appearance: Normal appearance. She is well-developed.   HENT:      Head: Normocephalic and atraumatic.   Eyes:      General: No scleral icterus.     Conjunctiva/sclera: Conjunctivae normal.   Pulmonary:      Effort: Pulmonary effort is normal.   Abdominal:      General: There is no distension.      Palpations: Abdomen is soft.      Tenderness: There is no abdominal tenderness.   Musculoskeletal:      Cervical back: Normal range of motion and neck supple.   Skin:     General: Skin is warm and dry.   Neurological:      Mental Status: She is  "alert.   Psychiatric:         Mood and Affect: Mood normal.         Behavior: Behavior normal.       Lab Results   Component Value Date    WBC 8.0 02/13/2025    HGB 14.0 02/13/2025    HCT 42.8 02/13/2025    MCV 94 02/13/2025     02/13/2025     Lab Results   Component Value Date    GLUCOSE 83 02/13/2025    BUN 22 (H) 02/13/2025    CREATININE 0.99 02/13/2025    EGFR 80 02/13/2025    BCR 22 02/13/2025    K 4.3 02/13/2025    CO2 22 02/13/2025    CALCIUM 9.4 02/13/2025    ALBUMIN 4.5 02/13/2025    BILITOT 0.3 02/13/2025    AST 20 02/13/2025    ALT 27 02/13/2025     No results found for: \"WWTS345\"   No results found for: \"CALPROFECAL\"     US Gallbladder  Result Date: 2/21/2025   1. Simple appearing 1.1 cm cyst in the dome of the liver. 2. Otherwise, unremarkable right upper quadrant ultrasound.  This report was finalized on 2/21/2025 3:09 PM by Tereso Obrien MD on Workstation: [id number]         Assessment & Plan   Diagnoses and all orders for this visit:    Epigastric pain    Other orders  -     famotidine (PEPCID) 40 MG tablet; Take 1 tablet by mouth Daily.      Plan:  Persistent epigastric pain despite PPI therapy.  Concern for PUD.  Recommend proceeding with endoscopy for further evaluation.  Continue Carafate, trial of famotidine for symptomatic relief.  Further plans based upon the results of endoscopy     "

## 2025-04-30 NOTE — TELEPHONE ENCOUNTER
Advised that [facility] will call with final arrival time minimum 24 hrs before procedure. IF they do not get a phone call, arrival time will stay the same as given on instructions OK FOR THE HUB TO RELAY   05/08  Egd

## 2025-05-08 ENCOUNTER — OUTSIDE FACILITY SERVICE (OUTPATIENT)
Dept: GASTROENTEROLOGY | Facility: CLINIC | Age: 30
End: 2025-05-08
Payer: COMMERCIAL

## 2025-05-08 ENCOUNTER — LAB REQUISITION (OUTPATIENT)
Dept: LAB | Facility: HOSPITAL | Age: 30
End: 2025-05-08
Payer: COMMERCIAL

## 2025-05-08 DIAGNOSIS — R10.13 EPIGASTRIC PAIN: ICD-10-CM

## 2025-05-08 DIAGNOSIS — K31.89 OTHER DISEASES OF STOMACH AND DUODENUM: ICD-10-CM

## 2025-05-08 DIAGNOSIS — K21.00 GASTRO-ESOPHAGEAL REFLUX DISEASE WITH ESOPHAGITIS, WITHOUT BLEEDING: ICD-10-CM

## 2025-05-08 PROCEDURE — 88305 TISSUE EXAM BY PATHOLOGIST: CPT | Performed by: INTERNAL MEDICINE

## 2025-05-08 NOTE — PROGRESS NOTES
I N T E R N A L  M E D I C I N E  CYNDEE ARTEAGA, APRN      ENCOUNTER DATE:  05/09/2025    Viry Loredo Theodore / 29 y.o. / female        You have chosen to receive care through a telehealth visit.  Do you consent to use a video/audio connection for your medical care today? Yes    Location of patient is in [place] at work and location of provider is in [place] at clinic      CHIEF COMPLAINT / REASON FOR OFFICE VISIT     TELEHEALTH ENCOUNTER:  Abdominal Pain      ASSESSMENT & PLAN     Diagnoses and all orders for this visit:    1. Epigastric pain (Primary)          SUMMARY/DISCUSSION  Symptoms improving.  Avoid NSAIDS.  Continue famotidine, sucralfate as prescribed.  Follow up with Dr. Fink's office to discuss recent EGD biopsy results.  She will provide me with an update via [employer] once she discusses results with Dr. Fink.      Time spent: 10 minutes    Next Appointment with me: Visit date not found    Return for Late March 2026 for annual physical.        HISTORY OF PRESENT ILLNESS     Here as 6 week follow up for epigastric abdominal discomfort.  Reports improved symptoms with taking famotidine 40 mg daily, sucralfate 1 g every morning.  Eating, drinking well.  Completed EGD with Dr. Fink yesterday - waiting on discussing biopsy results with GI provider.  Small bowel biopsy showed slight increase in intraepithelial lymphocytes, raising the possibility of celiac disease.  Stomach biopsy showed benign gastric mucosa with mild chronic inflammation.  Does have family history of celiac disease.      REVIEWED DATA     Labs:           Imaging:           Medical Tests:           Summary of old records / correspondence / consultant report:           Request outside records:         Template created by Moses Ansari MD

## 2025-05-09 ENCOUNTER — TELEPHONE (OUTPATIENT)
Dept: INTERNAL MEDICINE | Age: 30
End: 2025-05-09

## 2025-05-09 ENCOUNTER — TELEMEDICINE (OUTPATIENT)
Dept: INTERNAL MEDICINE | Age: 30
End: 2025-05-09
Payer: COMMERCIAL

## 2025-05-09 VITALS — BODY MASS INDEX: 29.41 KG/M2 | WEIGHT: 166 LBS | HEIGHT: 63 IN

## 2025-05-09 DIAGNOSIS — R10.13 EPIGASTRIC PAIN: Primary | ICD-10-CM

## 2025-05-09 LAB
CYTO UR: NORMAL
LAB AP CASE REPORT: NORMAL
LAB AP CLINICAL INFORMATION: NORMAL
PATH REPORT.FINAL DX SPEC: NORMAL
PATH REPORT.GROSS SPEC: NORMAL

## 2025-06-20 ENCOUNTER — TELEPHONE (OUTPATIENT)
Dept: INTERNAL MEDICINE | Age: 30
End: 2025-06-20

## 2025-06-20 ENCOUNTER — TELEMEDICINE (OUTPATIENT)
Dept: INTERNAL MEDICINE | Age: 30
End: 2025-06-20
Payer: COMMERCIAL

## 2025-06-20 VITALS — HEIGHT: 63 IN | WEIGHT: 167 LBS | BODY MASS INDEX: 29.59 KG/M2

## 2025-06-20 DIAGNOSIS — F32.A ANXIETY AND DEPRESSION: Primary | ICD-10-CM

## 2025-06-20 DIAGNOSIS — F41.9 ANXIETY AND DEPRESSION: Primary | ICD-10-CM

## 2025-06-20 PROCEDURE — 99214 OFFICE O/P EST MOD 30 MIN: CPT

## 2025-06-20 NOTE — TELEPHONE ENCOUNTER
"Called pt to schedule \"Return in about 4 weeks (around 7/18/2025) for Recheck Anxiety/ Depression\" but she did not answer.    LVM x1  "

## 2025-06-20 NOTE — PROGRESS NOTES
I N T E R N A L  M E D I C I N E  CYNDEE ARTEAGA, APRN      ENCOUNTER DATE:  06/20/2025    Viry Gertrude Arben / 30 y.o. / female        You have chosen to receive care through a telehealth visit.  Do you consent to use a video/audio connection for your medical care today? Yes        CHIEF COMPLAINT / REASON FOR OFFICE VISIT     TELEHEALTH ENCOUNTER:  Anxiety      ASSESSMENT & PLAN     Diagnoses and all orders for this visit:    1. Anxiety and depression (Primary)          SUMMARY/DISCUSSION  Persistent anxiety and depression symptoms for last few months.  We discussed consideration of resuming citalopram at this time with recheck in 4 weeks.  Reviewed risks, benefits, side effects, including concern for breastfeeding.  At this time, she would like to check with her friend who practices in mental health/ maternal medicine about safety prior to resuming citalopram.  She states she will send me an update via [employer].  She declines sertraline - several family members did not respond to this medication or had side effects.  She would also like to consider possibly adding Vraylar to her medication regimen at some point in the future, as several of her family members have done well with this medication.  May consider GeneSight testing as well.  Continue with counseling as scheduled.         Time spent: 15 minutes    Next Appointment with me: Visit date not found    Return in about 4 weeks (around 7/18/2025) for Recheck Anxiety/ Depression.        HISTORY OF PRESENT ILLNESS     Medical history significant for depression and anxiety.  Since March 2025, progressive low, feeling down feelings, overwhelmed, anxious, irritability, lack of libido.  Baby born in November 2024.  Prior to pregnancy, she took citalopram 20 mg daily with benefit but discontinued during pregnancy.  Started therapy last Wednesday and plans to attend at least 6 weekly sessions for now.  No SI/ HI.      Followed by OBD[facility]. Autumn VICTORIA.       REVIEWED DATA     Labs:           Imaging:           Medical Tests:           Summary of old records / correspondence / consultant report:           Request outside records:         Template created by Moses Ansari MD

## 2025-07-31 ENCOUNTER — TELEMEDICINE (OUTPATIENT)
Dept: INTERNAL MEDICINE | Age: 30
End: 2025-07-31
Payer: COMMERCIAL

## 2025-07-31 VITALS — HEIGHT: 63 IN | BODY MASS INDEX: 28.88 KG/M2 | WEIGHT: 163 LBS

## 2025-07-31 DIAGNOSIS — F32.A ANXIETY AND DEPRESSION: Primary | ICD-10-CM

## 2025-07-31 DIAGNOSIS — F41.9 ANXIETY AND DEPRESSION: Primary | ICD-10-CM

## 2025-07-31 PROCEDURE — 99213 OFFICE O/P EST LOW 20 MIN: CPT

## 2025-07-31 NOTE — PROGRESS NOTES
I N T E R N A L  M E D I C I N E  CYNDEE ARTEAGA, APRN      ENCOUNTER DATE:  07/31/2025    Viry Theodore / 30 y.o. / female        You have chosen to receive care through a telehealth visit.  Do you consent to use a video/audio connection for your medical care today? Yes    Location of patient is in [place]       CHIEF COMPLAINT / REASON FOR OFFICE VISIT     TELEHEALTH ENCOUNTER:  Anxiety and Depression      ASSESSMENT & PLAN     Diagnoses and all orders for this visit:    1. Anxiety and depression (Primary)  Overview:  Continue fluoxetine 20 mg daily.    Orders:  -     FLUoxetine (PROzac) 20 MG capsule; Take 1 capsule by mouth Daily.  Dispense: 90 capsule; Refill: 1          SUMMARY/DISCUSSION  Anxiety and depression symptoms improved on fluoxetine 20 mg daily; continue same.  She will reach out for any progressive or worsening mood changes.  Continue counseling.  Re assess at annual physical in March 2026.        Time spent: 15 minutes    Next Appointment with me: Visit date not found    Return for Next scheduled follow up.        HISTORY OF PRESENT ILLNESS     Last seen June 20, 2025 via telehealth.  At that time, started on fluoxetine 20 mg daily for mixed anxiety and depression symptoms.  Reports improvement in mood - less worrying thoughts, improved anxiety and reduced dysphoric mood.  No side effects - tolerating well.  No SI/ HI.  Attending counseling.